# Patient Record
Sex: FEMALE | Race: WHITE | NOT HISPANIC OR LATINO | Employment: OTHER | URBAN - METROPOLITAN AREA
[De-identification: names, ages, dates, MRNs, and addresses within clinical notes are randomized per-mention and may not be internally consistent; named-entity substitution may affect disease eponyms.]

---

## 2017-01-16 ENCOUNTER — COMMUNICATION - HEALTHEAST (OUTPATIENT)
Dept: INTERNAL MEDICINE | Facility: CLINIC | Age: 82
End: 2017-01-16

## 2017-01-16 ENCOUNTER — AMBULATORY - HEALTHEAST (OUTPATIENT)
Dept: LAB | Facility: CLINIC | Age: 82
End: 2017-01-16

## 2017-01-16 DIAGNOSIS — I48.19 PERSISTENT ATRIAL FIBRILLATION (H): ICD-10-CM

## 2017-01-16 DIAGNOSIS — I48.91 ATRIAL FIBRILLATION (H): ICD-10-CM

## 2017-01-23 ENCOUNTER — OFFICE VISIT - HEALTHEAST (OUTPATIENT)
Dept: INTERNAL MEDICINE | Facility: CLINIC | Age: 82
End: 2017-01-23

## 2017-01-23 ENCOUNTER — COMMUNICATION - HEALTHEAST (OUTPATIENT)
Dept: INTERNAL MEDICINE | Facility: CLINIC | Age: 82
End: 2017-01-23

## 2017-01-23 DIAGNOSIS — E78.5 HYPERLIPIDEMIA: ICD-10-CM

## 2017-01-23 DIAGNOSIS — Z51.81 MEDICATION MONITORING ENCOUNTER: ICD-10-CM

## 2017-01-23 DIAGNOSIS — M81.0 OSTEOPOROSIS: ICD-10-CM

## 2017-01-23 LAB
CHOLEST SERPL-MCNC: 220 MG/DL
FASTING STATUS PATIENT QL REPORTED: YES
HDLC SERPL-MCNC: 49 MG/DL
LDLC SERPL CALC-MCNC: 146 MG/DL
TRIGL SERPL-MCNC: 124 MG/DL

## 2017-01-23 ASSESSMENT — MIFFLIN-ST. JEOR: SCORE: 997.17

## 2017-01-24 ENCOUNTER — COMMUNICATION - HEALTHEAST (OUTPATIENT)
Dept: INTERNAL MEDICINE | Facility: CLINIC | Age: 82
End: 2017-01-24

## 2017-02-03 ENCOUNTER — COMMUNICATION - HEALTHEAST (OUTPATIENT)
Dept: CARDIOLOGY | Facility: CLINIC | Age: 82
End: 2017-02-03

## 2017-02-06 ENCOUNTER — COMMUNICATION - HEALTHEAST (OUTPATIENT)
Dept: INTERNAL MEDICINE | Facility: CLINIC | Age: 82
End: 2017-02-06

## 2017-02-06 ENCOUNTER — COMMUNICATION - HEALTHEAST (OUTPATIENT)
Dept: CARDIOLOGY | Facility: CLINIC | Age: 82
End: 2017-02-06

## 2017-02-06 ENCOUNTER — OFFICE VISIT - HEALTHEAST (OUTPATIENT)
Dept: INTERNAL MEDICINE | Facility: CLINIC | Age: 82
End: 2017-02-06

## 2017-02-06 DIAGNOSIS — I48.19 PERSISTENT ATRIAL FIBRILLATION (H): ICD-10-CM

## 2017-02-06 DIAGNOSIS — R00.1 BRADYCARDIA: ICD-10-CM

## 2017-02-06 DIAGNOSIS — I10 ESSENTIAL HYPERTENSION: ICD-10-CM

## 2017-02-06 DIAGNOSIS — R63.5 WEIGHT GAIN: ICD-10-CM

## 2017-02-06 DIAGNOSIS — I48.91 ATRIAL FIBRILLATION (H): ICD-10-CM

## 2017-02-06 DIAGNOSIS — Z79.899 ENCOUNTER FOR LONG-TERM (CURRENT) USE OF OTHER MEDICATIONS: ICD-10-CM

## 2017-02-06 DIAGNOSIS — R06.02 SOB (SHORTNESS OF BREATH): ICD-10-CM

## 2017-02-06 DIAGNOSIS — I51.9 HEART DISEASE, UNSPECIFIED: ICD-10-CM

## 2017-02-07 ENCOUNTER — COMMUNICATION - HEALTHEAST (OUTPATIENT)
Dept: INTERNAL MEDICINE | Facility: CLINIC | Age: 82
End: 2017-02-07

## 2017-02-07 LAB
ATRIAL RATE - MUSE: NORMAL BPM
DIASTOLIC BLOOD PRESSURE - MUSE: NORMAL MMHG
INTERPRETATION ECG - MUSE: NORMAL
P AXIS - MUSE: NORMAL DEGREES
PR INTERVAL - MUSE: NORMAL MS
QRS DURATION - MUSE: 98 MS
QT - MUSE: 520 MS
QTC - MUSE: 449 MS
R AXIS - MUSE: 74 DEGREES
SYSTOLIC BLOOD PRESSURE - MUSE: NORMAL MMHG
T AXIS - MUSE: -71 DEGREES
VENTRICULAR RATE- MUSE: 45 BPM

## 2017-02-08 ENCOUNTER — OFFICE VISIT - HEALTHEAST (OUTPATIENT)
Dept: CARDIOLOGY | Facility: CLINIC | Age: 82
End: 2017-02-08

## 2017-02-08 DIAGNOSIS — I48.21 PERMANENT ATRIAL FIBRILLATION (H): ICD-10-CM

## 2017-02-08 DIAGNOSIS — I49.8 JUNCTIONAL RHYTHM: ICD-10-CM

## 2017-02-08 ASSESSMENT — MIFFLIN-ST. JEOR: SCORE: 1008.29

## 2017-02-10 ENCOUNTER — HOSPITAL ENCOUNTER (OUTPATIENT)
Dept: CARDIOLOGY | Facility: CLINIC | Age: 82
Discharge: HOME | End: 2017-02-10
Attending: INTERNAL MEDICINE

## 2017-02-10 DIAGNOSIS — I48.21 PERMANENT ATRIAL FIBRILLATION (H): ICD-10-CM

## 2017-02-15 ENCOUNTER — AMBULATORY - HEALTHEAST (OUTPATIENT)
Dept: CARDIOLOGY | Facility: CLINIC | Age: 82
End: 2017-02-15

## 2017-02-15 DIAGNOSIS — I48.91 A-FIB (H): ICD-10-CM

## 2017-02-16 ENCOUNTER — AMBULATORY - HEALTHEAST (OUTPATIENT)
Dept: LAB | Facility: CLINIC | Age: 82
End: 2017-02-16

## 2017-02-16 ENCOUNTER — COMMUNICATION - HEALTHEAST (OUTPATIENT)
Dept: INTERNAL MEDICINE | Facility: CLINIC | Age: 82
End: 2017-02-16

## 2017-02-16 DIAGNOSIS — I48.19 PERSISTENT ATRIAL FIBRILLATION (H): ICD-10-CM

## 2017-02-16 DIAGNOSIS — I48.21 PERMANENT ATRIAL FIBRILLATION (H): ICD-10-CM

## 2017-02-17 ENCOUNTER — OFFICE VISIT - HEALTHEAST (OUTPATIENT)
Dept: CARDIOLOGY | Facility: CLINIC | Age: 82
End: 2017-02-17

## 2017-02-17 DIAGNOSIS — I10 ESSENTIAL HYPERTENSION WITH GOAL BLOOD PRESSURE LESS THAN 140/90: ICD-10-CM

## 2017-02-17 DIAGNOSIS — I48.91 A-FIB (H): ICD-10-CM

## 2017-02-17 DIAGNOSIS — I48.20 CHRONIC ATRIAL FIBRILLATION (H): ICD-10-CM

## 2017-02-17 DIAGNOSIS — I48.19 PERSISTENT ATRIAL FIBRILLATION (H): ICD-10-CM

## 2017-02-17 LAB
ATRIAL RATE - MUSE: 63 BPM
DIASTOLIC BLOOD PRESSURE - MUSE: NORMAL MMHG
INTERPRETATION ECG - MUSE: NORMAL
P AXIS - MUSE: NORMAL DEGREES
PR INTERVAL - MUSE: NORMAL MS
QRS DURATION - MUSE: 118 MS
QT - MUSE: 486 MS
QTC - MUSE: 460 MS
R AXIS - MUSE: 65 DEGREES
SYSTOLIC BLOOD PRESSURE - MUSE: NORMAL MMHG
T AXIS - MUSE: -57 DEGREES
VENTRICULAR RATE- MUSE: 54 BPM

## 2017-02-17 ASSESSMENT — MIFFLIN-ST. JEOR: SCORE: 990.14

## 2017-03-02 ENCOUNTER — AMBULATORY - HEALTHEAST (OUTPATIENT)
Dept: LAB | Facility: CLINIC | Age: 82
End: 2017-03-02

## 2017-03-02 ENCOUNTER — COMMUNICATION - HEALTHEAST (OUTPATIENT)
Dept: INTERNAL MEDICINE | Facility: CLINIC | Age: 82
End: 2017-03-02

## 2017-03-02 DIAGNOSIS — I48.20 CHRONIC ATRIAL FIBRILLATION (H): ICD-10-CM

## 2017-03-02 DIAGNOSIS — I48.19 PERSISTENT ATRIAL FIBRILLATION (H): ICD-10-CM

## 2017-03-02 DIAGNOSIS — K21.9 ESOPHAGEAL REFLUX: ICD-10-CM

## 2017-03-10 ENCOUNTER — COMMUNICATION - HEALTHEAST (OUTPATIENT)
Dept: CARDIOLOGY | Facility: CLINIC | Age: 82
End: 2017-03-10

## 2017-03-14 ENCOUNTER — COMMUNICATION - HEALTHEAST (OUTPATIENT)
Dept: INTERNAL MEDICINE | Facility: CLINIC | Age: 82
End: 2017-03-14

## 2017-03-29 ENCOUNTER — AMBULATORY - HEALTHEAST (OUTPATIENT)
Dept: NURSING | Facility: CLINIC | Age: 82
End: 2017-03-29

## 2017-03-29 ENCOUNTER — AMBULATORY - HEALTHEAST (OUTPATIENT)
Dept: LAB | Facility: CLINIC | Age: 82
End: 2017-03-29

## 2017-03-29 ENCOUNTER — COMMUNICATION - HEALTHEAST (OUTPATIENT)
Dept: INTERNAL MEDICINE | Facility: CLINIC | Age: 82
End: 2017-03-29

## 2017-03-29 DIAGNOSIS — I48.20 CHRONIC ATRIAL FIBRILLATION (H): ICD-10-CM

## 2017-03-29 DIAGNOSIS — I48.19 PERSISTENT ATRIAL FIBRILLATION (H): ICD-10-CM

## 2017-04-19 ENCOUNTER — OFFICE VISIT - HEALTHEAST (OUTPATIENT)
Dept: CARDIOLOGY | Facility: CLINIC | Age: 82
End: 2017-04-19

## 2017-04-19 DIAGNOSIS — I10 ESSENTIAL HYPERTENSION WITH GOAL BLOOD PRESSURE LESS THAN 140/90: ICD-10-CM

## 2017-04-19 DIAGNOSIS — I48.91 ATRIAL FIBRILLATION (H): ICD-10-CM

## 2017-04-19 DIAGNOSIS — I48.20 CHRONIC ATRIAL FIBRILLATION (H): ICD-10-CM

## 2017-04-19 ASSESSMENT — MIFFLIN-ST. JEOR: SCORE: 990.14

## 2017-04-27 ENCOUNTER — AMBULATORY - HEALTHEAST (OUTPATIENT)
Dept: LAB | Facility: CLINIC | Age: 82
End: 2017-04-27

## 2017-04-27 ENCOUNTER — COMMUNICATION - HEALTHEAST (OUTPATIENT)
Dept: INTERNAL MEDICINE | Facility: CLINIC | Age: 82
End: 2017-04-27

## 2017-04-27 DIAGNOSIS — I48.19 PERSISTENT ATRIAL FIBRILLATION (H): ICD-10-CM

## 2017-04-27 DIAGNOSIS — I48.20 CHRONIC ATRIAL FIBRILLATION (H): ICD-10-CM

## 2017-05-01 ENCOUNTER — HOSPITAL ENCOUNTER (OUTPATIENT)
Dept: CARDIOLOGY | Facility: HOSPITAL | Age: 82
Discharge: HOME | End: 2017-05-01
Attending: INTERNAL MEDICINE

## 2017-05-01 DIAGNOSIS — I48.91 ATRIAL FIBRILLATION (H): ICD-10-CM

## 2017-05-01 ASSESSMENT — MIFFLIN-ST. JEOR: SCORE: 990.14

## 2017-05-02 LAB
AORTIC ROOT: 2.6 CM
AR DECEL SLOPE: 3460 MM/S2
AR PEAK VELOCITY: 432 CM/S
AV REGURGITANT PEAK GRADIENT: 74.6 MMHG
AV REGURGITATION PRESSURE HALF TIME: 380 MS
BSA FOR ECHO PROCEDURE: 1.64 M2
CV BLOOD PRESSURE: NORMAL MMHG
CV ECHO HEIGHT: 62 IN
CV ECHO WEIGHT: 136 LBS
DOP CALC LVOT AREA: 2.54 CM2
DOP CALC LVOT DIAMETER: 1.8 CM
DOP CALC LVOT PEAK VEL: 70.2 CM/S
DOP CALC LVOT STROKE VOLUME: 45.8 CM3
DOP CALCLVOT PEAK VEL VTI: 18 CM
EJECTION FRACTION: 54 % (ref 55–75)
FRACTIONAL SHORTENING: 43.2 % (ref 28–44)
INTERVENTRICULAR SEPTUM IN END DIASTOLE: 0.8 CM (ref 0.6–0.9)
IVS/PW RATIO: 1.1
LEFT ATRIUM AREA: 13.9 CM2
LEFT ATRIUM LENGTH: 4.9 CM
LEFT ATRIUM SIZE: 4.1 CM
LEFT ATRIUM TO AORTIC ROOT RATIO: 1.58 NO UNITS
LEFT VENTRICLE DIASTOLIC VOLUME INDEX: 23.8 CM3/M2 (ref 34–74)
LEFT VENTRICLE DIASTOLIC VOLUME: 39 CM3 (ref 46–106)
LEFT VENTRICLE MASS INDEX: 61.3 G/M2
LEFT VENTRICLE SYSTOLIC VOLUME INDEX: 11 CM3/M2 (ref 11–31)
LEFT VENTRICLE SYSTOLIC VOLUME: 18 CM3 (ref 14–42)
LEFT VENTRICULAR INTERNAL DIMENSION IN DIASTOLE: 4.4 CM (ref 3.8–5.2)
LEFT VENTRICULAR INTERNAL DIMENSION IN SYSTOLE: 2.5 CM (ref 2.2–3.5)
LEFT VENTRICULAR MASS: 100.6 G
LEFT VENTRICULAR OUTFLOW TRACT MEAN GRADIENT: 1 MMHG
LEFT VENTRICULAR OUTFLOW TRACT MEAN VELOCITY: 51.1 CM/S
LEFT VENTRICULAR OUTFLOW TRACT PEAK GRADIENT: 2 MMHG
LEFT VENTRICULAR POSTERIOR WALL IN END DIASTOLE: 0.7 CM (ref 0.6–0.9)
LV STROKE VOLUME INDEX: 27.9 ML/M2
MV AVERAGE E/E' RATIO: 21.3 CM/S
MV DECELERATION TIME: 106 MS
MV E'TISSUE VEL-LAT: 5.17 CM/S
MV E'TISSUE VEL-MED: 4.87 CM/S
MV LATERAL E/E' RATIO: 20.7
MV MEDIAL E/E' RATIO: 22
MV PEAK E VELOCITY: 107 CM/S
NUC REST DIASTOLIC VOLUME INDEX: 2176 LBS
NUC REST SYSTOLIC VOLUME INDEX: 62 IN
PR MAX PG: 9 MMHG
PR PEAK VELOCITY: 166 CM/S
TRICUSPID REGURGITATION PEAK PRESSURE GRADIENT: 25 MMHG
TRICUSPID VALVE PEAK REGURGITANT VELOCITY: 250 CM/S

## 2017-05-12 ENCOUNTER — COMMUNICATION - HEALTHEAST (OUTPATIENT)
Dept: CARDIOLOGY | Facility: CLINIC | Age: 82
End: 2017-05-12

## 2017-05-12 DIAGNOSIS — I48.19 PERSISTENT ATRIAL FIBRILLATION (H): ICD-10-CM

## 2017-05-25 ENCOUNTER — AMBULATORY - HEALTHEAST (OUTPATIENT)
Dept: LAB | Facility: CLINIC | Age: 82
End: 2017-05-25

## 2017-05-25 ENCOUNTER — COMMUNICATION - HEALTHEAST (OUTPATIENT)
Dept: INTERNAL MEDICINE | Facility: CLINIC | Age: 82
End: 2017-05-25

## 2017-05-25 DIAGNOSIS — I48.20 CHRONIC ATRIAL FIBRILLATION (H): ICD-10-CM

## 2017-05-25 DIAGNOSIS — I48.19 PERSISTENT ATRIAL FIBRILLATION (H): ICD-10-CM

## 2017-05-31 ENCOUNTER — COMMUNICATION - HEALTHEAST (OUTPATIENT)
Dept: INTERNAL MEDICINE | Facility: CLINIC | Age: 82
End: 2017-05-31

## 2017-05-31 DIAGNOSIS — E87.6 DIURETIC-INDUCED HYPOKALEMIA: ICD-10-CM

## 2017-05-31 DIAGNOSIS — T50.2X5A DIURETIC-INDUCED HYPOKALEMIA: ICD-10-CM

## 2017-06-02 ENCOUNTER — OFFICE VISIT - HEALTHEAST (OUTPATIENT)
Dept: CARDIOLOGY | Facility: CLINIC | Age: 82
End: 2017-06-02

## 2017-06-02 DIAGNOSIS — I10 ESSENTIAL HYPERTENSION WITH GOAL BLOOD PRESSURE LESS THAN 130/80: ICD-10-CM

## 2017-06-02 DIAGNOSIS — I48.20 CHRONIC ATRIAL FIBRILLATION (H): ICD-10-CM

## 2017-06-02 RX ORDER — CLINDAMYCIN HCL 300 MG
CAPSULE ORAL
Refills: 0 | Status: SHIPPED | COMMUNITY
Start: 2017-05-18 | End: 2023-01-01

## 2017-06-02 ASSESSMENT — MIFFLIN-ST. JEOR: SCORE: 994.68

## 2017-06-16 ENCOUNTER — COMMUNICATION - HEALTHEAST (OUTPATIENT)
Dept: CARDIOLOGY | Facility: CLINIC | Age: 82
End: 2017-06-16

## 2017-06-21 ENCOUNTER — COMMUNICATION - HEALTHEAST (OUTPATIENT)
Dept: INTERNAL MEDICINE | Facility: CLINIC | Age: 82
End: 2017-06-21

## 2017-06-21 ENCOUNTER — AMBULATORY - HEALTHEAST (OUTPATIENT)
Dept: LAB | Facility: CLINIC | Age: 82
End: 2017-06-21

## 2017-06-21 DIAGNOSIS — I48.20 CHRONIC ATRIAL FIBRILLATION (H): ICD-10-CM

## 2017-06-21 DIAGNOSIS — R60.9 EDEMA: ICD-10-CM

## 2017-06-21 DIAGNOSIS — I10 ESSENTIAL HYPERTENSION: ICD-10-CM

## 2017-06-21 DIAGNOSIS — I48.19 PERSISTENT ATRIAL FIBRILLATION (H): ICD-10-CM

## 2017-07-11 ENCOUNTER — COMMUNICATION - HEALTHEAST (OUTPATIENT)
Dept: INTERNAL MEDICINE | Facility: CLINIC | Age: 82
End: 2017-07-11

## 2017-07-11 DIAGNOSIS — I48.20 CHRONIC ATRIAL FIBRILLATION (H): ICD-10-CM

## 2017-07-18 ENCOUNTER — COMMUNICATION - HEALTHEAST (OUTPATIENT)
Dept: CARDIOLOGY | Facility: CLINIC | Age: 82
End: 2017-07-18

## 2017-07-18 DIAGNOSIS — I48.20 CHRONIC ATRIAL FIBRILLATION (H): ICD-10-CM

## 2017-07-25 ENCOUNTER — COMMUNICATION - HEALTHEAST (OUTPATIENT)
Dept: INTERNAL MEDICINE | Facility: CLINIC | Age: 82
End: 2017-07-25

## 2017-07-25 ENCOUNTER — OFFICE VISIT - HEALTHEAST (OUTPATIENT)
Dept: INTERNAL MEDICINE | Facility: CLINIC | Age: 82
End: 2017-07-25

## 2017-07-25 DIAGNOSIS — Z51.81 MEDICATION MONITORING ENCOUNTER: ICD-10-CM

## 2017-07-25 DIAGNOSIS — I48.20 CHRONIC ATRIAL FIBRILLATION (H): ICD-10-CM

## 2017-07-25 DIAGNOSIS — I48.19 PERSISTENT ATRIAL FIBRILLATION (H): ICD-10-CM

## 2017-07-25 DIAGNOSIS — E03.9 HYPOTHYROIDISM: ICD-10-CM

## 2017-07-25 ASSESSMENT — MIFFLIN-ST. JEOR: SCORE: 1022.8

## 2017-07-26 ENCOUNTER — COMMUNICATION - HEALTHEAST (OUTPATIENT)
Dept: INTERNAL MEDICINE | Facility: CLINIC | Age: 82
End: 2017-07-26

## 2017-07-31 ENCOUNTER — COMMUNICATION - HEALTHEAST (OUTPATIENT)
Dept: INTERNAL MEDICINE | Facility: CLINIC | Age: 82
End: 2017-07-31

## 2017-07-31 DIAGNOSIS — I48.20 CHRONIC ATRIAL FIBRILLATION (H): ICD-10-CM

## 2017-08-01 ENCOUNTER — COMMUNICATION - HEALTHEAST (OUTPATIENT)
Dept: INTERNAL MEDICINE | Facility: CLINIC | Age: 82
End: 2017-08-01

## 2017-08-09 ENCOUNTER — AMBULATORY - HEALTHEAST (OUTPATIENT)
Dept: LAB | Facility: CLINIC | Age: 82
End: 2017-08-09

## 2017-08-09 ENCOUNTER — COMMUNICATION - HEALTHEAST (OUTPATIENT)
Dept: INTERNAL MEDICINE | Facility: CLINIC | Age: 82
End: 2017-08-09

## 2017-08-09 ENCOUNTER — AMBULATORY - HEALTHEAST (OUTPATIENT)
Dept: NURSING | Facility: CLINIC | Age: 82
End: 2017-08-09

## 2017-08-09 DIAGNOSIS — I48.20 CHRONIC ATRIAL FIBRILLATION (H): ICD-10-CM

## 2017-08-09 DIAGNOSIS — I48.19 PERSISTENT ATRIAL FIBRILLATION (H): ICD-10-CM

## 2017-08-22 ENCOUNTER — OFFICE VISIT - HEALTHEAST (OUTPATIENT)
Dept: INTERNAL MEDICINE | Facility: CLINIC | Age: 82
End: 2017-08-22

## 2017-08-22 ENCOUNTER — COMMUNICATION - HEALTHEAST (OUTPATIENT)
Dept: INTERNAL MEDICINE | Facility: CLINIC | Age: 82
End: 2017-08-22

## 2017-08-22 DIAGNOSIS — I48.20 CHRONIC ATRIAL FIBRILLATION (H): ICD-10-CM

## 2017-08-22 DIAGNOSIS — I48.21 PERMANENT ATRIAL FIBRILLATION (H): ICD-10-CM

## 2017-08-22 DIAGNOSIS — Z51.81 MEDICATION MONITORING ENCOUNTER: ICD-10-CM

## 2017-08-22 DIAGNOSIS — I48.19 PERSISTENT ATRIAL FIBRILLATION (H): ICD-10-CM

## 2017-08-22 ASSESSMENT — MIFFLIN-ST. JEOR: SCORE: 1009.7

## 2017-08-23 ENCOUNTER — COMMUNICATION - HEALTHEAST (OUTPATIENT)
Dept: INTERNAL MEDICINE | Facility: CLINIC | Age: 82
End: 2017-08-23

## 2017-08-30 ENCOUNTER — HOSPITAL ENCOUNTER (OUTPATIENT)
Dept: CARDIOLOGY | Facility: HOSPITAL | Age: 82
Discharge: HOME | End: 2017-08-30
Attending: INTERNAL MEDICINE

## 2017-08-30 DIAGNOSIS — I48.21 PERMANENT ATRIAL FIBRILLATION (H): ICD-10-CM

## 2017-09-05 ENCOUNTER — AMBULATORY - HEALTHEAST (OUTPATIENT)
Dept: LAB | Facility: CLINIC | Age: 82
End: 2017-09-05

## 2017-09-05 ENCOUNTER — COMMUNICATION - HEALTHEAST (OUTPATIENT)
Dept: INTERNAL MEDICINE | Facility: CLINIC | Age: 82
End: 2017-09-05

## 2017-09-05 DIAGNOSIS — I48.19 PERSISTENT ATRIAL FIBRILLATION (H): ICD-10-CM

## 2017-09-05 DIAGNOSIS — I48.20 CHRONIC ATRIAL FIBRILLATION (H): ICD-10-CM

## 2017-09-07 ENCOUNTER — COMMUNICATION - HEALTHEAST (OUTPATIENT)
Dept: INTERNAL MEDICINE | Facility: CLINIC | Age: 82
End: 2017-09-07

## 2017-09-19 ENCOUNTER — COMMUNICATION - HEALTHEAST (OUTPATIENT)
Dept: INTERNAL MEDICINE | Facility: CLINIC | Age: 82
End: 2017-09-19

## 2017-09-19 ENCOUNTER — AMBULATORY - HEALTHEAST (OUTPATIENT)
Dept: LAB | Facility: CLINIC | Age: 82
End: 2017-09-19

## 2017-09-19 DIAGNOSIS — I48.19 PERSISTENT ATRIAL FIBRILLATION (H): ICD-10-CM

## 2017-09-19 DIAGNOSIS — I48.20 CHRONIC ATRIAL FIBRILLATION (H): ICD-10-CM

## 2017-09-27 ENCOUNTER — COMMUNICATION - HEALTHEAST (OUTPATIENT)
Dept: INTERNAL MEDICINE | Facility: CLINIC | Age: 82
End: 2017-09-27

## 2017-09-27 DIAGNOSIS — I48.20 CHRONIC ATRIAL FIBRILLATION (H): ICD-10-CM

## 2017-10-11 ENCOUNTER — COMMUNICATION - HEALTHEAST (OUTPATIENT)
Dept: INTERNAL MEDICINE | Facility: CLINIC | Age: 82
End: 2017-10-11

## 2017-10-11 ENCOUNTER — AMBULATORY - HEALTHEAST (OUTPATIENT)
Dept: NURSING | Facility: CLINIC | Age: 82
End: 2017-10-11

## 2017-10-11 ENCOUNTER — AMBULATORY - HEALTHEAST (OUTPATIENT)
Dept: LAB | Facility: CLINIC | Age: 82
End: 2017-10-11

## 2017-10-11 DIAGNOSIS — I48.20 CHRONIC ATRIAL FIBRILLATION (H): ICD-10-CM

## 2017-10-11 DIAGNOSIS — Z00.00 HEALTH MAINTENANCE EXAMINATION: ICD-10-CM

## 2017-10-11 DIAGNOSIS — I48.19 PERSISTENT ATRIAL FIBRILLATION (H): ICD-10-CM

## 2017-10-19 ENCOUNTER — AMBULATORY - HEALTHEAST (OUTPATIENT)
Dept: CARDIOLOGY | Facility: CLINIC | Age: 82
End: 2017-10-19

## 2017-10-19 DIAGNOSIS — I48.91 ATRIAL FIBRILLATION (H): ICD-10-CM

## 2017-11-02 ENCOUNTER — COMMUNICATION - HEALTHEAST (OUTPATIENT)
Dept: FAMILY MEDICINE | Facility: CLINIC | Age: 82
End: 2017-11-02

## 2017-11-02 ENCOUNTER — AMBULATORY - HEALTHEAST (OUTPATIENT)
Dept: LAB | Facility: CLINIC | Age: 82
End: 2017-11-02

## 2017-11-02 DIAGNOSIS — I48.19 PERSISTENT ATRIAL FIBRILLATION (H): ICD-10-CM

## 2017-11-02 DIAGNOSIS — I48.20 CHRONIC ATRIAL FIBRILLATION (H): ICD-10-CM

## 2017-11-03 ENCOUNTER — HOSPITAL ENCOUNTER (OUTPATIENT)
Dept: CARDIOLOGY | Facility: HOSPITAL | Age: 82
Discharge: HOME | End: 2017-11-03
Attending: INTERNAL MEDICINE

## 2017-11-03 DIAGNOSIS — I48.91 ATRIAL FIBRILLATION (H): ICD-10-CM

## 2017-11-03 LAB
AORTIC ROOT: 2.6 CM
AR DECEL SLOPE: 2950 MM/S2
AR PEAK VELOCITY: 441 CM/S
ASCENDING AORTA: 3.2 CM
AV REGURGITANT PEAK GRADIENT: 77.8 MMHG
AV REGURGITATION PRESSURE HALF TIME: 441 MS
BSA FOR ECHO PROCEDURE: 1.67 M2
CV BLOOD PRESSURE: NORMAL MMHG
CV ECHO HEIGHT: 62 IN
CV ECHO WEIGHT: 140 LBS
DOP CALC LVOT AREA: 3.14 CM2
DOP CALC LVOT DIAMETER: 2 CM
DOP CALC LVOT PEAK VEL: 88.3 CM/S
DOP CALC LVOT STROKE VOLUME: 46.2 CM3
DOP CALCLVOT PEAK VEL VTI: 14.7 CM
EJECTION FRACTION: 53 % (ref 55–75)
FRACTIONAL SHORTENING: 27 % (ref 28–44)
INTERVENTRICULAR SEPTUM IN END DIASTOLE: 1.39 CM (ref 0.6–0.9)
IVS/PW RATIO: 1.3
LA AREA 1: 21.6 CM2
LA AREA 2: 19.7 CM2
LEFT ATRIUM LENGTH: 6.2 CM
LEFT ATRIUM SIZE: 3.3 CM
LEFT ATRIUM TO AORTIC ROOT RATIO: 1.27 NO UNITS
LEFT ATRIUM VOLUME INDEX: 34.9 ML/M2
LEFT ATRIUM VOLUME: 58.3 CM3
LEFT VENTRICLE DIASTOLIC VOLUME INDEX: 27.8 CM3/M2 (ref 34–74)
LEFT VENTRICLE DIASTOLIC VOLUME: 46.5 CM3 (ref 46–106)
LEFT VENTRICLE MASS INDEX: 86 G/M2
LEFT VENTRICLE SYSTOLIC VOLUME INDEX: 13 CM3/M2 (ref 11–31)
LEFT VENTRICLE SYSTOLIC VOLUME: 21.7 CM3 (ref 14–42)
LEFT VENTRICULAR INTERNAL DIMENSION IN DIASTOLE: 3.59 CM (ref 3.8–5.2)
LEFT VENTRICULAR INTERNAL DIMENSION IN SYSTOLE: 2.62 CM (ref 2.2–3.5)
LEFT VENTRICULAR MASS: 143.6 G
LEFT VENTRICULAR OUTFLOW TRACT MEAN GRADIENT: 2 MMHG
LEFT VENTRICULAR OUTFLOW TRACT MEAN VELOCITY: 57.3 CM/S
LEFT VENTRICULAR OUTFLOW TRACT PEAK GRADIENT: 3 MMHG
LEFT VENTRICULAR POSTERIOR WALL IN END DIASTOLE: 1.04 CM (ref 0.6–0.9)
LV STROKE VOLUME INDEX: 27.6 ML/M2
MITRAL VALVE DECELERATION SLOPE: 5770 MM/S2
MITRAL VALVE PRESSURE HALF-TIME: 60 MS
MV AVERAGE E/E' RATIO: 12.3 CM/S
MV DECELERATION TIME: 155 MS
MV E'TISSUE VEL-LAT: 10.6 CM/S
MV E'TISSUE VEL-MED: 6.19 CM/S
MV LATERAL E/E' RATIO: 9.7
MV MEDIAL E/E' RATIO: 16.6
MV PEAK E VELOCITY: 103 CM/S
MV VALVE AREA PRESSURE 1/2 METHOD: 3.7 CM2
NUC REST DIASTOLIC VOLUME INDEX: 2240 LBS
NUC REST SYSTOLIC VOLUME INDEX: 62 IN
TRICUSPID REGURGITATION PEAK PRESSURE GRADIENT: 25.8 MMHG
TRICUSPID VALVE ANULAR PLANE SYSTOLIC EXCURSION: 1.9 CM
TRICUSPID VALVE PEAK REGURGITANT VELOCITY: 254 CM/S

## 2017-11-03 ASSESSMENT — MIFFLIN-ST. JEOR: SCORE: 1008.29

## 2017-11-10 ENCOUNTER — OFFICE VISIT - HEALTHEAST (OUTPATIENT)
Dept: CARDIOLOGY | Facility: CLINIC | Age: 82
End: 2017-11-10

## 2017-11-10 DIAGNOSIS — I10 ESSENTIAL HYPERTENSION: ICD-10-CM

## 2017-11-10 DIAGNOSIS — I48.20 CHRONIC ATRIAL FIBRILLATION (H): ICD-10-CM

## 2017-11-10 ASSESSMENT — MIFFLIN-ST. JEOR: SCORE: 985.61

## 2017-11-13 ENCOUNTER — RECORDS - HEALTHEAST (OUTPATIENT)
Dept: ADMINISTRATIVE | Facility: OTHER | Age: 82
End: 2017-11-13

## 2017-11-13 ENCOUNTER — RECORDS - HEALTHEAST (OUTPATIENT)
Dept: BONE DENSITY | Facility: CLINIC | Age: 82
End: 2017-11-13

## 2017-11-13 DIAGNOSIS — M81.0 AGE-RELATED OSTEOPOROSIS WITHOUT CURRENT PATHOLOGICAL FRACTURE: ICD-10-CM

## 2017-11-20 ENCOUNTER — COMMUNICATION - HEALTHEAST (OUTPATIENT)
Dept: INTERNAL MEDICINE | Facility: CLINIC | Age: 82
End: 2017-11-20

## 2017-11-20 ENCOUNTER — OFFICE VISIT - HEALTHEAST (OUTPATIENT)
Dept: INTERNAL MEDICINE | Facility: CLINIC | Age: 82
End: 2017-11-20

## 2017-11-20 DIAGNOSIS — I48.19 PERSISTENT ATRIAL FIBRILLATION (H): ICD-10-CM

## 2017-11-20 DIAGNOSIS — M25.562 CHRONIC PAIN OF LEFT KNEE: ICD-10-CM

## 2017-11-20 DIAGNOSIS — I48.20 CHRONIC ATRIAL FIBRILLATION (H): ICD-10-CM

## 2017-11-20 DIAGNOSIS — L30.9 DERMATITIS: ICD-10-CM

## 2017-11-20 DIAGNOSIS — G89.29 CHRONIC PAIN OF LEFT KNEE: ICD-10-CM

## 2017-11-20 DIAGNOSIS — M25.569 KNEE PAIN: ICD-10-CM

## 2017-11-20 ASSESSMENT — MIFFLIN-ST. JEOR: SCORE: 988.33

## 2017-11-21 ENCOUNTER — COMMUNICATION - HEALTHEAST (OUTPATIENT)
Dept: INTERNAL MEDICINE | Facility: CLINIC | Age: 82
End: 2017-11-21

## 2017-11-21 DIAGNOSIS — I48.20 CHRONIC ATRIAL FIBRILLATION (H): ICD-10-CM

## 2017-12-19 ENCOUNTER — AMBULATORY - HEALTHEAST (OUTPATIENT)
Dept: LAB | Facility: CLINIC | Age: 82
End: 2017-12-19

## 2017-12-19 ENCOUNTER — COMMUNICATION - HEALTHEAST (OUTPATIENT)
Dept: INTERNAL MEDICINE | Facility: CLINIC | Age: 82
End: 2017-12-19

## 2017-12-19 DIAGNOSIS — I48.19 PERSISTENT ATRIAL FIBRILLATION (H): ICD-10-CM

## 2017-12-19 DIAGNOSIS — I48.20 CHRONIC ATRIAL FIBRILLATION (H): ICD-10-CM

## 2018-01-03 ENCOUNTER — AMBULATORY - HEALTHEAST (OUTPATIENT)
Dept: LAB | Facility: CLINIC | Age: 83
End: 2018-01-03

## 2018-01-03 ENCOUNTER — COMMUNICATION - HEALTHEAST (OUTPATIENT)
Dept: INTERNAL MEDICINE | Facility: CLINIC | Age: 83
End: 2018-01-03

## 2018-01-03 DIAGNOSIS — I48.19 PERSISTENT ATRIAL FIBRILLATION (H): ICD-10-CM

## 2018-01-03 DIAGNOSIS — I48.20 CHRONIC ATRIAL FIBRILLATION (H): ICD-10-CM

## 2018-01-03 LAB — INR PPP: 2.3 (ref 0.9–1.1)

## 2018-01-09 ENCOUNTER — COMMUNICATION - HEALTHEAST (OUTPATIENT)
Dept: INTERNAL MEDICINE | Facility: CLINIC | Age: 83
End: 2018-01-09

## 2018-01-09 DIAGNOSIS — I48.20 CHRONIC ATRIAL FIBRILLATION (H): ICD-10-CM

## 2018-01-14 ENCOUNTER — COMMUNICATION - HEALTHEAST (OUTPATIENT)
Dept: CARDIOLOGY | Facility: CLINIC | Age: 83
End: 2018-01-14

## 2018-01-14 DIAGNOSIS — I48.20 CHRONIC ATRIAL FIBRILLATION (H): ICD-10-CM

## 2018-01-18 ENCOUNTER — AMBULATORY - HEALTHEAST (OUTPATIENT)
Dept: LAB | Facility: CLINIC | Age: 83
End: 2018-01-18

## 2018-01-18 ENCOUNTER — COMMUNICATION - HEALTHEAST (OUTPATIENT)
Dept: INTERNAL MEDICINE | Facility: CLINIC | Age: 83
End: 2018-01-18

## 2018-01-18 DIAGNOSIS — I48.20 CHRONIC ATRIAL FIBRILLATION (H): ICD-10-CM

## 2018-01-18 LAB — INR PPP: 3.5 (ref 0.9–1.1)

## 2018-02-01 ENCOUNTER — COMMUNICATION - HEALTHEAST (OUTPATIENT)
Dept: INTERNAL MEDICINE | Facility: CLINIC | Age: 83
End: 2018-02-01

## 2018-02-01 ENCOUNTER — AMBULATORY - HEALTHEAST (OUTPATIENT)
Dept: LAB | Facility: CLINIC | Age: 83
End: 2018-02-01

## 2018-02-01 DIAGNOSIS — I48.20 CHRONIC ATRIAL FIBRILLATION (H): ICD-10-CM

## 2018-02-01 LAB — INR PPP: 2.3 (ref 0.9–1.1)

## 2018-02-07 ENCOUNTER — AMBULATORY - HEALTHEAST (OUTPATIENT)
Dept: INTERNAL MEDICINE | Facility: CLINIC | Age: 83
End: 2018-02-07

## 2018-02-07 DIAGNOSIS — M81.0 OSTEOPOROSIS: ICD-10-CM

## 2018-02-12 ENCOUNTER — COMMUNICATION - HEALTHEAST (OUTPATIENT)
Dept: INTERNAL MEDICINE | Facility: CLINIC | Age: 83
End: 2018-02-12

## 2018-02-12 ENCOUNTER — OFFICE VISIT - HEALTHEAST (OUTPATIENT)
Dept: INTERNAL MEDICINE | Facility: CLINIC | Age: 83
End: 2018-02-12

## 2018-02-12 DIAGNOSIS — M81.0 AGE-RELATED OSTEOPOROSIS WITHOUT CURRENT PATHOLOGICAL FRACTURE: ICD-10-CM

## 2018-02-12 DIAGNOSIS — E78.2 MIXED HYPERLIPIDEMIA: ICD-10-CM

## 2018-02-12 DIAGNOSIS — Z51.81 MEDICATION MONITORING ENCOUNTER: ICD-10-CM

## 2018-02-12 DIAGNOSIS — I48.20 CHRONIC ATRIAL FIBRILLATION (H): ICD-10-CM

## 2018-02-12 LAB
ALBUMIN SERPL-MCNC: 3.8 G/DL (ref 3.5–5)
ALP SERPL-CCNC: 76 U/L (ref 45–120)
ALT SERPL W P-5'-P-CCNC: 18 U/L (ref 0–45)
ANION GAP SERPL CALCULATED.3IONS-SCNC: 10 MMOL/L (ref 5–18)
AST SERPL W P-5'-P-CCNC: 20 U/L (ref 0–40)
BILIRUB SERPL-MCNC: 0.6 MG/DL (ref 0–1)
BUN SERPL-MCNC: 16 MG/DL (ref 8–28)
CALCIUM SERPL-MCNC: 9.3 MG/DL (ref 8.5–10.5)
CHLORIDE BLD-SCNC: 104 MMOL/L (ref 98–107)
CHOLEST SERPL-MCNC: 208 MG/DL
CO2 SERPL-SCNC: 28 MMOL/L (ref 22–31)
CREAT SERPL-MCNC: 0.77 MG/DL (ref 0.6–1.1)
ERYTHROCYTE [DISTWIDTH] IN BLOOD BY AUTOMATED COUNT: 13.3 % (ref 11–14.5)
FASTING STATUS PATIENT QL REPORTED: YES
GFR SERPL CREATININE-BSD FRML MDRD: >60 ML/MIN/1.73M2
GLUCOSE BLD-MCNC: 97 MG/DL (ref 70–125)
HCT VFR BLD AUTO: 44.8 % (ref 35–47)
HDLC SERPL-MCNC: 42 MG/DL
HGB BLD-MCNC: 14.7 G/DL (ref 12–16)
INR PPP: 1.9 (ref 0.9–1.1)
LDLC SERPL CALC-MCNC: 134 MG/DL
MCH RBC QN AUTO: 28 PG (ref 27–34)
MCHC RBC AUTO-ENTMCNC: 32.8 G/DL (ref 32–36)
MCV RBC AUTO: 85 FL (ref 80–100)
PLATELET # BLD AUTO: 274 THOU/UL (ref 140–440)
PMV BLD AUTO: 8.5 FL (ref 7–10)
POTASSIUM BLD-SCNC: 3.9 MMOL/L (ref 3.5–5)
PROT SERPL-MCNC: 6.7 G/DL (ref 6–8)
RBC # BLD AUTO: 5.26 MILL/UL (ref 3.8–5.4)
SODIUM SERPL-SCNC: 142 MMOL/L (ref 136–145)
TRIGL SERPL-MCNC: 158 MG/DL
WBC: 8.7 THOU/UL (ref 4–11)

## 2018-02-12 ASSESSMENT — MIFFLIN-ST. JEOR: SCORE: 960.09

## 2018-02-13 ENCOUNTER — COMMUNICATION - HEALTHEAST (OUTPATIENT)
Dept: INTERNAL MEDICINE | Facility: CLINIC | Age: 83
End: 2018-02-13

## 2018-02-13 LAB — 25(OH)D3 SERPL-MCNC: 75.6 NG/ML (ref 30–80)

## 2018-03-01 ENCOUNTER — AMBULATORY - HEALTHEAST (OUTPATIENT)
Dept: LAB | Facility: CLINIC | Age: 83
End: 2018-03-01

## 2018-03-01 ENCOUNTER — COMMUNICATION - HEALTHEAST (OUTPATIENT)
Dept: INTERNAL MEDICINE | Facility: CLINIC | Age: 83
End: 2018-03-01

## 2018-03-01 DIAGNOSIS — I48.20 CHRONIC ATRIAL FIBRILLATION (H): ICD-10-CM

## 2018-03-01 LAB — INR PPP: 1.7 (ref 0.9–1.1)

## 2018-03-14 ENCOUNTER — AMBULATORY - HEALTHEAST (OUTPATIENT)
Dept: LAB | Facility: CLINIC | Age: 83
End: 2018-03-14

## 2018-03-14 ENCOUNTER — COMMUNICATION - HEALTHEAST (OUTPATIENT)
Dept: INTERNAL MEDICINE | Facility: CLINIC | Age: 83
End: 2018-03-14

## 2018-03-14 DIAGNOSIS — I48.20 CHRONIC ATRIAL FIBRILLATION (H): ICD-10-CM

## 2018-03-14 LAB — INR PPP: 1.9 (ref 0.9–1.1)

## 2018-03-20 ENCOUNTER — COMMUNICATION - HEALTHEAST (OUTPATIENT)
Dept: INTERNAL MEDICINE | Facility: CLINIC | Age: 83
End: 2018-03-20

## 2018-03-20 ENCOUNTER — AMBULATORY - HEALTHEAST (OUTPATIENT)
Dept: LAB | Facility: CLINIC | Age: 83
End: 2018-03-20

## 2018-03-20 DIAGNOSIS — I48.20 CHRONIC ATRIAL FIBRILLATION (H): ICD-10-CM

## 2018-03-20 LAB — INR PPP: 1.9 (ref 0.9–1.1)

## 2018-04-06 ENCOUNTER — AMBULATORY - HEALTHEAST (OUTPATIENT)
Dept: LAB | Facility: CLINIC | Age: 83
End: 2018-04-06

## 2018-04-06 ENCOUNTER — COMMUNICATION - HEALTHEAST (OUTPATIENT)
Dept: INTERNAL MEDICINE | Facility: CLINIC | Age: 83
End: 2018-04-06

## 2018-04-06 DIAGNOSIS — I48.20 CHRONIC ATRIAL FIBRILLATION (H): ICD-10-CM

## 2018-04-06 LAB — INR PPP: 3.6 (ref 0.9–1.1)

## 2018-04-09 ENCOUNTER — COMMUNICATION - HEALTHEAST (OUTPATIENT)
Dept: INTERNAL MEDICINE | Facility: CLINIC | Age: 83
End: 2018-04-09

## 2018-04-09 DIAGNOSIS — I48.20 CHRONIC ATRIAL FIBRILLATION (H): ICD-10-CM

## 2018-04-16 ENCOUNTER — COMMUNICATION - HEALTHEAST (OUTPATIENT)
Dept: ANTICOAGULATION | Facility: CLINIC | Age: 83
End: 2018-04-16

## 2018-04-16 ENCOUNTER — AMBULATORY - HEALTHEAST (OUTPATIENT)
Dept: LAB | Facility: CLINIC | Age: 83
End: 2018-04-16

## 2018-04-16 DIAGNOSIS — I48.20 CHRONIC ATRIAL FIBRILLATION (H): ICD-10-CM

## 2018-04-16 LAB — INR PPP: 1.3 (ref 0.9–1.1)

## 2018-04-17 ENCOUNTER — COMMUNICATION - HEALTHEAST (OUTPATIENT)
Dept: INTERNAL MEDICINE | Facility: CLINIC | Age: 83
End: 2018-04-17

## 2018-04-17 DIAGNOSIS — K21.9 ESOPHAGEAL REFLUX: ICD-10-CM

## 2018-04-23 ENCOUNTER — AMBULATORY - HEALTHEAST (OUTPATIENT)
Dept: LAB | Facility: CLINIC | Age: 83
End: 2018-04-23

## 2018-04-23 ENCOUNTER — COMMUNICATION - HEALTHEAST (OUTPATIENT)
Dept: ANTICOAGULATION | Facility: CLINIC | Age: 83
End: 2018-04-23

## 2018-04-23 DIAGNOSIS — I48.20 CHRONIC ATRIAL FIBRILLATION (H): ICD-10-CM

## 2018-04-23 LAB — INR PPP: 1.8 (ref 0.9–1.1)

## 2018-04-30 ENCOUNTER — COMMUNICATION - HEALTHEAST (OUTPATIENT)
Dept: INTERNAL MEDICINE | Facility: CLINIC | Age: 83
End: 2018-04-30

## 2018-04-30 ENCOUNTER — AMBULATORY - HEALTHEAST (OUTPATIENT)
Dept: LAB | Facility: CLINIC | Age: 83
End: 2018-04-30

## 2018-04-30 DIAGNOSIS — I48.20 CHRONIC ATRIAL FIBRILLATION (H): ICD-10-CM

## 2018-04-30 LAB — INR PPP: 1.8 (ref 0.9–1.1)

## 2018-05-01 ENCOUNTER — AMBULATORY - HEALTHEAST (OUTPATIENT)
Dept: CARDIOLOGY | Facility: CLINIC | Age: 83
End: 2018-05-01

## 2018-05-01 ENCOUNTER — OFFICE VISIT - HEALTHEAST (OUTPATIENT)
Dept: CARDIOLOGY | Facility: CLINIC | Age: 83
End: 2018-05-01

## 2018-05-01 DIAGNOSIS — I48.20 CHRONIC ATRIAL FIBRILLATION (H): ICD-10-CM

## 2018-05-01 DIAGNOSIS — Z00.6 EXAMINATION OF PARTICIPANT IN CLINICAL TRIAL: ICD-10-CM

## 2018-05-01 DIAGNOSIS — Z00.6 RESEARCH EXAM: ICD-10-CM

## 2018-05-01 LAB
ATRIAL RATE - MUSE: 98 BPM
DIASTOLIC BLOOD PRESSURE - MUSE: NORMAL MMHG
INTERPRETATION ECG - MUSE: NORMAL
P AXIS - MUSE: NORMAL DEGREES
PR INTERVAL - MUSE: NORMAL MS
QRS DURATION - MUSE: 124 MS
QT - MUSE: 390 MS
QTC - MUSE: 482 MS
R AXIS - MUSE: 101 DEGREES
SYSTOLIC BLOOD PRESSURE - MUSE: NORMAL MMHG
T AXIS - MUSE: -26 DEGREES
VENTRICULAR RATE- MUSE: 92 BPM

## 2018-05-01 ASSESSMENT — MIFFLIN-ST. JEOR: SCORE: 956.44

## 2018-05-07 ENCOUNTER — AMBULATORY - HEALTHEAST (OUTPATIENT)
Dept: LAB | Facility: CLINIC | Age: 83
End: 2018-05-07

## 2018-05-07 ENCOUNTER — COMMUNICATION - HEALTHEAST (OUTPATIENT)
Dept: ANTICOAGULATION | Facility: CLINIC | Age: 83
End: 2018-05-07

## 2018-05-07 ENCOUNTER — COMMUNICATION - HEALTHEAST (OUTPATIENT)
Dept: INTERNAL MEDICINE | Facility: CLINIC | Age: 83
End: 2018-05-07

## 2018-05-07 DIAGNOSIS — I48.20 CHRONIC ATRIAL FIBRILLATION (H): ICD-10-CM

## 2018-05-07 LAB — INR PPP: 1.5 (ref 0.9–1.1)

## 2018-05-14 ENCOUNTER — AMBULATORY - HEALTHEAST (OUTPATIENT)
Dept: LAB | Facility: CLINIC | Age: 83
End: 2018-05-14

## 2018-05-14 ENCOUNTER — COMMUNICATION - HEALTHEAST (OUTPATIENT)
Dept: ANTICOAGULATION | Facility: CLINIC | Age: 83
End: 2018-05-14

## 2018-05-14 DIAGNOSIS — I48.20 CHRONIC ATRIAL FIBRILLATION (H): ICD-10-CM

## 2018-05-14 LAB — INR PPP: 2.1 (ref 0.9–1.1)

## 2018-05-21 ENCOUNTER — AMBULATORY - HEALTHEAST (OUTPATIENT)
Dept: LAB | Facility: CLINIC | Age: 83
End: 2018-05-21

## 2018-05-21 ENCOUNTER — COMMUNICATION - HEALTHEAST (OUTPATIENT)
Dept: ANTICOAGULATION | Facility: CLINIC | Age: 83
End: 2018-05-21

## 2018-05-21 DIAGNOSIS — I48.20 CHRONIC ATRIAL FIBRILLATION (H): ICD-10-CM

## 2018-05-21 LAB — INR PPP: 2.5 (ref 0.9–1.1)

## 2018-05-23 ENCOUNTER — COMMUNICATION - HEALTHEAST (OUTPATIENT)
Dept: INTERNAL MEDICINE | Facility: CLINIC | Age: 83
End: 2018-05-23

## 2018-05-23 DIAGNOSIS — I48.20 CHRONIC ATRIAL FIBRILLATION (H): ICD-10-CM

## 2018-06-06 ENCOUNTER — AMBULATORY - HEALTHEAST (OUTPATIENT)
Dept: LAB | Facility: CLINIC | Age: 83
End: 2018-06-06

## 2018-06-06 ENCOUNTER — COMMUNICATION - HEALTHEAST (OUTPATIENT)
Dept: ANTICOAGULATION | Facility: CLINIC | Age: 83
End: 2018-06-06

## 2018-06-06 DIAGNOSIS — I48.20 CHRONIC ATRIAL FIBRILLATION (H): ICD-10-CM

## 2018-06-06 LAB — INR PPP: 2.6 (ref 0.9–1.1)

## 2018-06-18 ENCOUNTER — OFFICE VISIT - HEALTHEAST (OUTPATIENT)
Dept: CARDIOLOGY | Facility: CLINIC | Age: 83
End: 2018-06-18

## 2018-06-18 DIAGNOSIS — I48.20 CHRONIC ATRIAL FIBRILLATION (H): ICD-10-CM

## 2018-06-18 DIAGNOSIS — I10 ESSENTIAL HYPERTENSION: ICD-10-CM

## 2018-06-18 DIAGNOSIS — I50.32 DIASTOLIC CHF, CHRONIC (H): ICD-10-CM

## 2018-06-18 ASSESSMENT — MIFFLIN-ST. JEOR: SCORE: 959.52

## 2018-06-20 ENCOUNTER — COMMUNICATION - HEALTHEAST (OUTPATIENT)
Dept: ANTICOAGULATION | Facility: CLINIC | Age: 83
End: 2018-06-20

## 2018-06-20 ENCOUNTER — AMBULATORY - HEALTHEAST (OUTPATIENT)
Dept: LAB | Facility: CLINIC | Age: 83
End: 2018-06-20

## 2018-06-20 DIAGNOSIS — I48.20 CHRONIC ATRIAL FIBRILLATION (H): ICD-10-CM

## 2018-06-20 LAB — INR PPP: 2.9 (ref 0.9–1.1)

## 2018-06-27 ENCOUNTER — COMMUNICATION - HEALTHEAST (OUTPATIENT)
Dept: INTERNAL MEDICINE | Facility: CLINIC | Age: 83
End: 2018-06-27

## 2018-06-27 DIAGNOSIS — I10 ESSENTIAL HYPERTENSION: ICD-10-CM

## 2018-06-27 DIAGNOSIS — R60.9 EDEMA: ICD-10-CM

## 2018-07-05 ENCOUNTER — COMMUNICATION - HEALTHEAST (OUTPATIENT)
Dept: ANTICOAGULATION | Facility: CLINIC | Age: 83
End: 2018-07-05

## 2018-07-05 ENCOUNTER — AMBULATORY - HEALTHEAST (OUTPATIENT)
Dept: LAB | Facility: CLINIC | Age: 83
End: 2018-07-05

## 2018-07-05 DIAGNOSIS — I48.20 CHRONIC ATRIAL FIBRILLATION (H): ICD-10-CM

## 2018-07-05 LAB — INR PPP: 2.7 (ref 0.9–1.1)

## 2018-07-15 ENCOUNTER — COMMUNICATION - HEALTHEAST (OUTPATIENT)
Dept: CARDIOLOGY | Facility: CLINIC | Age: 83
End: 2018-07-15

## 2018-07-15 DIAGNOSIS — I48.20 CHRONIC ATRIAL FIBRILLATION (H): ICD-10-CM

## 2018-07-19 ENCOUNTER — COMMUNICATION - HEALTHEAST (OUTPATIENT)
Dept: ANTICOAGULATION | Facility: CLINIC | Age: 83
End: 2018-07-19

## 2018-07-19 ENCOUNTER — AMBULATORY - HEALTHEAST (OUTPATIENT)
Dept: LAB | Facility: CLINIC | Age: 83
End: 2018-07-19

## 2018-07-19 DIAGNOSIS — I48.20 CHRONIC ATRIAL FIBRILLATION (H): ICD-10-CM

## 2018-07-19 LAB — INR PPP: 2.9 (ref 0.9–1.1)

## 2018-07-23 ENCOUNTER — COMMUNICATION - HEALTHEAST (OUTPATIENT)
Dept: INTERNAL MEDICINE | Facility: CLINIC | Age: 83
End: 2018-07-23

## 2018-07-23 DIAGNOSIS — I48.19 PERSISTENT ATRIAL FIBRILLATION (H): ICD-10-CM

## 2018-07-31 ENCOUNTER — AMBULATORY - HEALTHEAST (OUTPATIENT)
Dept: LAB | Facility: CLINIC | Age: 83
End: 2018-07-31

## 2018-07-31 ENCOUNTER — COMMUNICATION - HEALTHEAST (OUTPATIENT)
Dept: ANTICOAGULATION | Facility: CLINIC | Age: 83
End: 2018-07-31

## 2018-07-31 DIAGNOSIS — I48.20 CHRONIC ATRIAL FIBRILLATION (H): ICD-10-CM

## 2018-07-31 LAB — INR PPP: 1.9 (ref 0.9–1.1)

## 2018-08-13 ENCOUNTER — OFFICE VISIT - HEALTHEAST (OUTPATIENT)
Dept: INTERNAL MEDICINE | Facility: CLINIC | Age: 83
End: 2018-08-13

## 2018-08-13 ENCOUNTER — COMMUNICATION - HEALTHEAST (OUTPATIENT)
Dept: ANTICOAGULATION | Facility: CLINIC | Age: 83
End: 2018-08-13

## 2018-08-13 DIAGNOSIS — Z51.81 MEDICATION MONITORING ENCOUNTER: ICD-10-CM

## 2018-08-13 DIAGNOSIS — E78.2 MIXED HYPERLIPIDEMIA: ICD-10-CM

## 2018-08-13 DIAGNOSIS — I48.20 CHRONIC ATRIAL FIBRILLATION (H): ICD-10-CM

## 2018-08-13 DIAGNOSIS — Z00.00 HEALTHCARE MAINTENANCE: ICD-10-CM

## 2018-08-13 LAB
ANION GAP SERPL CALCULATED.3IONS-SCNC: 11 MMOL/L (ref 5–18)
BUN SERPL-MCNC: 17 MG/DL (ref 8–28)
CALCIUM SERPL-MCNC: 9.5 MG/DL (ref 8.5–10.5)
CHLORIDE BLD-SCNC: 104 MMOL/L (ref 98–107)
CHOLEST SERPL-MCNC: 197 MG/DL
CO2 SERPL-SCNC: 25 MMOL/L (ref 22–31)
CREAT SERPL-MCNC: 0.77 MG/DL (ref 0.6–1.1)
DIGOXIN LEVEL LHE- HISTORICAL: 0.5 NG/ML (ref 0.5–2)
FASTING STATUS PATIENT QL REPORTED: NO
GFR SERPL CREATININE-BSD FRML MDRD: >60 ML/MIN/1.73M2
GLUCOSE BLD-MCNC: 84 MG/DL (ref 70–125)
HDLC SERPL-MCNC: 35 MG/DL
INR PPP: 3.4 (ref 0.9–1.1)
LDLC SERPL CALC-MCNC: 121 MG/DL
MAGNESIUM SERPL-MCNC: 2.2 MG/DL (ref 1.8–2.6)
POTASSIUM BLD-SCNC: 4.1 MMOL/L (ref 3.5–5)
SODIUM SERPL-SCNC: 140 MMOL/L (ref 136–145)
TRIGL SERPL-MCNC: 207 MG/DL

## 2018-08-14 ENCOUNTER — COMMUNICATION - HEALTHEAST (OUTPATIENT)
Dept: INTERNAL MEDICINE | Facility: CLINIC | Age: 83
End: 2018-08-14

## 2018-08-28 ENCOUNTER — AMBULATORY - HEALTHEAST (OUTPATIENT)
Dept: LAB | Facility: CLINIC | Age: 83
End: 2018-08-28

## 2018-08-28 ENCOUNTER — COMMUNICATION - HEALTHEAST (OUTPATIENT)
Dept: ANTICOAGULATION | Facility: CLINIC | Age: 83
End: 2018-08-28

## 2018-08-28 DIAGNOSIS — I48.20 CHRONIC ATRIAL FIBRILLATION (H): ICD-10-CM

## 2018-08-28 LAB — INR PPP: 2.1 (ref 0.9–1.1)

## 2018-09-11 ENCOUNTER — COMMUNICATION - HEALTHEAST (OUTPATIENT)
Dept: ANTICOAGULATION | Facility: CLINIC | Age: 83
End: 2018-09-11

## 2018-09-11 ENCOUNTER — AMBULATORY - HEALTHEAST (OUTPATIENT)
Dept: LAB | Facility: CLINIC | Age: 83
End: 2018-09-11

## 2018-09-11 DIAGNOSIS — I48.20 CHRONIC ATRIAL FIBRILLATION (H): ICD-10-CM

## 2018-09-11 LAB — INR PPP: 1.7 (ref 0.9–1.1)

## 2018-09-18 ENCOUNTER — COMMUNICATION - HEALTHEAST (OUTPATIENT)
Dept: ANTICOAGULATION | Facility: CLINIC | Age: 83
End: 2018-09-18

## 2018-09-18 ENCOUNTER — AMBULATORY - HEALTHEAST (OUTPATIENT)
Dept: LAB | Facility: CLINIC | Age: 83
End: 2018-09-18

## 2018-09-18 DIAGNOSIS — I48.20 CHRONIC ATRIAL FIBRILLATION (H): ICD-10-CM

## 2018-09-18 LAB — INR PPP: 2.3 (ref 0.9–1.1)

## 2018-09-21 ENCOUNTER — COMMUNICATION - HEALTHEAST (OUTPATIENT)
Dept: INTERNAL MEDICINE | Facility: CLINIC | Age: 83
End: 2018-09-21

## 2018-09-21 DIAGNOSIS — I10 ESSENTIAL HYPERTENSION: ICD-10-CM

## 2018-09-21 DIAGNOSIS — R60.9 EDEMA: ICD-10-CM

## 2018-09-24 ENCOUNTER — COMMUNICATION - HEALTHEAST (OUTPATIENT)
Dept: INTERNAL MEDICINE | Facility: CLINIC | Age: 83
End: 2018-09-24

## 2018-09-24 DIAGNOSIS — I48.19 PERSISTENT ATRIAL FIBRILLATION (H): ICD-10-CM

## 2018-10-03 ENCOUNTER — COMMUNICATION - HEALTHEAST (OUTPATIENT)
Dept: ANTICOAGULATION | Facility: CLINIC | Age: 83
End: 2018-10-03

## 2018-10-03 ENCOUNTER — AMBULATORY - HEALTHEAST (OUTPATIENT)
Dept: LAB | Facility: CLINIC | Age: 83
End: 2018-10-03

## 2018-10-03 DIAGNOSIS — I48.20 CHRONIC ATRIAL FIBRILLATION (H): ICD-10-CM

## 2018-10-03 LAB — INR PPP: 1.9 (ref 0.9–1.1)

## 2018-10-10 ENCOUNTER — COMMUNICATION - HEALTHEAST (OUTPATIENT)
Dept: INTERNAL MEDICINE | Facility: CLINIC | Age: 83
End: 2018-10-10

## 2018-10-10 ENCOUNTER — AMBULATORY - HEALTHEAST (OUTPATIENT)
Dept: LAB | Facility: CLINIC | Age: 83
End: 2018-10-10

## 2018-10-10 ENCOUNTER — COMMUNICATION - HEALTHEAST (OUTPATIENT)
Dept: ANTICOAGULATION | Facility: CLINIC | Age: 83
End: 2018-10-10

## 2018-10-10 DIAGNOSIS — Z23 FLU VACCINE NEED: ICD-10-CM

## 2018-10-10 DIAGNOSIS — I48.20 CHRONIC ATRIAL FIBRILLATION (H): ICD-10-CM

## 2018-10-10 LAB — INR PPP: 2.3 (ref 0.9–1.1)

## 2018-10-15 ENCOUNTER — COMMUNICATION - HEALTHEAST (OUTPATIENT)
Dept: CARDIOLOGY | Facility: CLINIC | Age: 83
End: 2018-10-15

## 2018-10-15 DIAGNOSIS — I48.20 CHRONIC ATRIAL FIBRILLATION (H): ICD-10-CM

## 2018-10-24 ENCOUNTER — COMMUNICATION - HEALTHEAST (OUTPATIENT)
Dept: ANTICOAGULATION | Facility: CLINIC | Age: 83
End: 2018-10-24

## 2018-10-24 ENCOUNTER — AMBULATORY - HEALTHEAST (OUTPATIENT)
Dept: LAB | Facility: CLINIC | Age: 83
End: 2018-10-24

## 2018-10-24 DIAGNOSIS — I48.20 CHRONIC ATRIAL FIBRILLATION (H): ICD-10-CM

## 2018-10-24 LAB — INR PPP: 2.2 (ref 0.9–1.1)

## 2018-11-07 ENCOUNTER — AMBULATORY - HEALTHEAST (OUTPATIENT)
Dept: LAB | Facility: CLINIC | Age: 83
End: 2018-11-07

## 2018-11-07 ENCOUNTER — COMMUNICATION - HEALTHEAST (OUTPATIENT)
Dept: ANTICOAGULATION | Facility: CLINIC | Age: 83
End: 2018-11-07

## 2018-11-07 DIAGNOSIS — I48.20 CHRONIC ATRIAL FIBRILLATION (H): ICD-10-CM

## 2018-11-07 LAB — INR PPP: 2.3 (ref 0.9–1.1)

## 2018-11-26 ENCOUNTER — COMMUNICATION - HEALTHEAST (OUTPATIENT)
Dept: INTERNAL MEDICINE | Facility: CLINIC | Age: 83
End: 2018-11-26

## 2018-11-27 ENCOUNTER — OFFICE VISIT - HEALTHEAST (OUTPATIENT)
Dept: INTERNAL MEDICINE | Facility: CLINIC | Age: 83
End: 2018-11-27

## 2018-11-27 ENCOUNTER — COMMUNICATION - HEALTHEAST (OUTPATIENT)
Dept: INTERNAL MEDICINE | Facility: CLINIC | Age: 83
End: 2018-11-27

## 2018-11-27 ENCOUNTER — AMBULATORY - HEALTHEAST (OUTPATIENT)
Dept: LAB | Facility: CLINIC | Age: 83
End: 2018-11-27

## 2018-11-27 ENCOUNTER — COMMUNICATION - HEALTHEAST (OUTPATIENT)
Dept: ANTICOAGULATION | Facility: CLINIC | Age: 83
End: 2018-11-27

## 2018-11-27 DIAGNOSIS — I48.20 CHRONIC ATRIAL FIBRILLATION (H): ICD-10-CM

## 2018-11-27 DIAGNOSIS — M54.50 ACUTE MIDLINE LOW BACK PAIN WITHOUT SCIATICA: ICD-10-CM

## 2018-11-27 LAB — INR PPP: 3.1 (ref 0.9–1.1)

## 2018-12-03 ENCOUNTER — COMMUNICATION - HEALTHEAST (OUTPATIENT)
Dept: SCHEDULING | Facility: CLINIC | Age: 83
End: 2018-12-03

## 2018-12-03 DIAGNOSIS — M81.0 SENILE OSTEOPOROSIS: ICD-10-CM

## 2018-12-03 DIAGNOSIS — M54.50 ACUTE MIDLINE LOW BACK PAIN WITHOUT SCIATICA: ICD-10-CM

## 2018-12-11 ENCOUNTER — RECORDS - HEALTHEAST (OUTPATIENT)
Dept: GENERAL RADIOLOGY | Facility: CLINIC | Age: 83
End: 2018-12-11

## 2018-12-11 ENCOUNTER — AMBULATORY - HEALTHEAST (OUTPATIENT)
Dept: LAB | Facility: CLINIC | Age: 83
End: 2018-12-11

## 2018-12-11 ENCOUNTER — COMMUNICATION - HEALTHEAST (OUTPATIENT)
Dept: ANTICOAGULATION | Facility: CLINIC | Age: 83
End: 2018-12-11

## 2018-12-11 DIAGNOSIS — I48.20 CHRONIC ATRIAL FIBRILLATION (H): ICD-10-CM

## 2018-12-11 DIAGNOSIS — M54.50 LOW BACK PAIN: ICD-10-CM

## 2018-12-11 DIAGNOSIS — M81.0 AGE-RELATED OSTEOPOROSIS WITHOUT CURRENT PATHOLOGICAL FRACTURE: ICD-10-CM

## 2018-12-11 LAB — INR PPP: 2.6 (ref 0.9–1.1)

## 2018-12-13 ENCOUNTER — AMBULATORY - HEALTHEAST (OUTPATIENT)
Dept: INTERNAL MEDICINE | Facility: CLINIC | Age: 83
End: 2018-12-13

## 2018-12-13 ENCOUNTER — COMMUNICATION - HEALTHEAST (OUTPATIENT)
Dept: INTERNAL MEDICINE | Facility: CLINIC | Age: 83
End: 2018-12-13

## 2018-12-13 ENCOUNTER — COMMUNICATION - HEALTHEAST (OUTPATIENT)
Dept: ANTICOAGULATION | Facility: CLINIC | Age: 83
End: 2018-12-13

## 2018-12-13 DIAGNOSIS — I48.20 CHRONIC ATRIAL FIBRILLATION (H): ICD-10-CM

## 2018-12-13 DIAGNOSIS — M54.50 MIDLINE LOW BACK PAIN WITHOUT SCIATICA, UNSPECIFIED CHRONICITY: ICD-10-CM

## 2018-12-27 ENCOUNTER — COMMUNICATION - HEALTHEAST (OUTPATIENT)
Dept: ANTICOAGULATION | Facility: CLINIC | Age: 83
End: 2018-12-27

## 2018-12-27 ENCOUNTER — AMBULATORY - HEALTHEAST (OUTPATIENT)
Dept: LAB | Facility: CLINIC | Age: 83
End: 2018-12-27

## 2018-12-27 DIAGNOSIS — I48.20 CHRONIC ATRIAL FIBRILLATION (H): ICD-10-CM

## 2018-12-27 LAB — INR PPP: 2.6 (ref 0.9–1.1)

## 2019-01-17 ENCOUNTER — COMMUNICATION - HEALTHEAST (OUTPATIENT)
Dept: ANTICOAGULATION | Facility: CLINIC | Age: 84
End: 2019-01-17

## 2019-01-17 ENCOUNTER — COMMUNICATION - HEALTHEAST (OUTPATIENT)
Dept: SCHEDULING | Facility: CLINIC | Age: 84
End: 2019-01-17

## 2019-01-17 ENCOUNTER — COMMUNICATION - HEALTHEAST (OUTPATIENT)
Dept: CARDIOLOGY | Facility: CLINIC | Age: 84
End: 2019-01-17

## 2019-01-17 ENCOUNTER — AMBULATORY - HEALTHEAST (OUTPATIENT)
Dept: NURSING | Facility: CLINIC | Age: 84
End: 2019-01-17

## 2019-01-17 ENCOUNTER — AMBULATORY - HEALTHEAST (OUTPATIENT)
Dept: LAB | Facility: CLINIC | Age: 84
End: 2019-01-17

## 2019-01-17 DIAGNOSIS — I48.20 CHRONIC ATRIAL FIBRILLATION (H): ICD-10-CM

## 2019-01-17 DIAGNOSIS — R00.1 BRADYCARDIA: ICD-10-CM

## 2019-01-17 LAB — INR PPP: 1.9 (ref 0.9–1.1)

## 2019-01-25 ENCOUNTER — OFFICE VISIT - HEALTHEAST (OUTPATIENT)
Dept: INTERNAL MEDICINE | Facility: CLINIC | Age: 84
End: 2019-01-25

## 2019-01-25 DIAGNOSIS — I10 ESSENTIAL HYPERTENSION: ICD-10-CM

## 2019-01-25 DIAGNOSIS — R60.9 EDEMA: ICD-10-CM

## 2019-01-25 DIAGNOSIS — I48.20 CHRONIC ATRIAL FIBRILLATION (H): ICD-10-CM

## 2019-01-29 ENCOUNTER — AMBULATORY - HEALTHEAST (OUTPATIENT)
Dept: LAB | Facility: CLINIC | Age: 84
End: 2019-01-29

## 2019-01-29 ENCOUNTER — COMMUNICATION - HEALTHEAST (OUTPATIENT)
Dept: ANTICOAGULATION | Facility: CLINIC | Age: 84
End: 2019-01-29

## 2019-01-29 ENCOUNTER — COMMUNICATION - HEALTHEAST (OUTPATIENT)
Dept: INTERNAL MEDICINE | Facility: CLINIC | Age: 84
End: 2019-01-29

## 2019-01-29 DIAGNOSIS — I48.20 CHRONIC ATRIAL FIBRILLATION (H): ICD-10-CM

## 2019-01-29 LAB — INR PPP: 2.3 (ref 0.9–1.1)

## 2019-02-19 ENCOUNTER — COMMUNICATION - HEALTHEAST (OUTPATIENT)
Dept: INTERNAL MEDICINE | Facility: CLINIC | Age: 84
End: 2019-02-19

## 2019-02-19 ENCOUNTER — OFFICE VISIT - HEALTHEAST (OUTPATIENT)
Dept: INTERNAL MEDICINE | Facility: CLINIC | Age: 84
End: 2019-02-19

## 2019-02-19 ENCOUNTER — COMMUNICATION - HEALTHEAST (OUTPATIENT)
Dept: ANTICOAGULATION | Facility: CLINIC | Age: 84
End: 2019-02-19

## 2019-02-19 DIAGNOSIS — R06.02 SHORTNESS OF BREATH: ICD-10-CM

## 2019-02-19 DIAGNOSIS — I48.20 CHRONIC ATRIAL FIBRILLATION (H): ICD-10-CM

## 2019-02-19 DIAGNOSIS — M81.0 AGE-RELATED OSTEOPOROSIS WITHOUT CURRENT PATHOLOGICAL FRACTURE: ICD-10-CM

## 2019-02-19 DIAGNOSIS — Z51.81 ENCOUNTER FOR MEDICATION MONITORING: ICD-10-CM

## 2019-02-19 LAB
ANION GAP SERPL CALCULATED.3IONS-SCNC: 11 MMOL/L (ref 5–18)
ATRIAL RATE - MUSE: NORMAL BPM
BNP SERPL-MCNC: 580 PG/ML (ref 0–167)
BUN SERPL-MCNC: 26 MG/DL (ref 8–28)
CALCIUM SERPL-MCNC: 9.8 MG/DL (ref 8.5–10.5)
CHLORIDE BLD-SCNC: 105 MMOL/L (ref 98–107)
CO2 SERPL-SCNC: 25 MMOL/L (ref 22–31)
CREAT SERPL-MCNC: 1.05 MG/DL (ref 0.6–1.1)
DIASTOLIC BLOOD PRESSURE - MUSE: NORMAL MMHG
ERYTHROCYTE [DISTWIDTH] IN BLOOD BY AUTOMATED COUNT: 12.9 % (ref 11–14.5)
GFR SERPL CREATININE-BSD FRML MDRD: 49 ML/MIN/1.73M2
GLUCOSE BLD-MCNC: 96 MG/DL (ref 70–125)
HCT VFR BLD AUTO: 42.2 % (ref 35–47)
HGB BLD-MCNC: 13.6 G/DL (ref 12–16)
INR PPP: 3.4 (ref 0.9–1.1)
INTERPRETATION ECG - MUSE: NORMAL
MCH RBC QN AUTO: 28.3 PG (ref 27–34)
MCHC RBC AUTO-ENTMCNC: 32.3 G/DL (ref 32–36)
MCV RBC AUTO: 88 FL (ref 80–100)
P AXIS - MUSE: NORMAL DEGREES
PLATELET # BLD AUTO: 185 THOU/UL (ref 140–440)
PMV BLD AUTO: 8.8 FL (ref 7–10)
POTASSIUM BLD-SCNC: 4.1 MMOL/L (ref 3.5–5)
PR INTERVAL - MUSE: NORMAL MS
QRS DURATION - MUSE: 98 MS
QT - MUSE: 522 MS
QTC - MUSE: 425 MS
R AXIS - MUSE: 72 DEGREES
RBC # BLD AUTO: 4.81 MILL/UL (ref 3.8–5.4)
SODIUM SERPL-SCNC: 141 MMOL/L (ref 136–145)
SYSTOLIC BLOOD PRESSURE - MUSE: NORMAL MMHG
T AXIS - MUSE: -81 DEGREES
VENTRICULAR RATE- MUSE: 40 BPM
WBC: 9.6 THOU/UL (ref 4–11)

## 2019-02-20 ENCOUNTER — COMMUNICATION - HEALTHEAST (OUTPATIENT)
Dept: INTERNAL MEDICINE | Facility: CLINIC | Age: 84
End: 2019-02-20

## 2019-02-26 ENCOUNTER — AMBULATORY - HEALTHEAST (OUTPATIENT)
Dept: LAB | Facility: CLINIC | Age: 84
End: 2019-02-26

## 2019-02-26 ENCOUNTER — HOSPITAL ENCOUNTER (OUTPATIENT)
Dept: CARDIOLOGY | Facility: HOSPITAL | Age: 84
Discharge: HOME | End: 2019-02-26
Attending: INTERNAL MEDICINE

## 2019-02-26 ENCOUNTER — COMMUNICATION - HEALTHEAST (OUTPATIENT)
Dept: ANTICOAGULATION | Facility: CLINIC | Age: 84
End: 2019-02-26

## 2019-02-26 ENCOUNTER — COMMUNICATION - HEALTHEAST (OUTPATIENT)
Dept: INTERNAL MEDICINE | Facility: CLINIC | Age: 84
End: 2019-02-26

## 2019-02-26 DIAGNOSIS — I48.20 CHRONIC ATRIAL FIBRILLATION (H): ICD-10-CM

## 2019-02-26 DIAGNOSIS — R06.02 SHORTNESS OF BREATH: ICD-10-CM

## 2019-02-26 LAB — INR PPP: 2.5 (ref 0.9–1.1)

## 2019-02-27 ENCOUNTER — RECORDS - HEALTHEAST (OUTPATIENT)
Dept: ADMINISTRATIVE | Facility: OTHER | Age: 84
End: 2019-02-27

## 2019-02-27 LAB — INR PPP: 2.7 (ref 0.9–1.1)

## 2019-02-28 ENCOUNTER — RECORDS - HEALTHEAST (OUTPATIENT)
Dept: ADMINISTRATIVE | Facility: OTHER | Age: 84
End: 2019-02-28

## 2019-02-28 LAB — INR PPP: 2.3 (ref 0.9–1.1)

## 2019-03-02 ENCOUNTER — RECORDS - HEALTHEAST (OUTPATIENT)
Dept: ADMINISTRATIVE | Facility: OTHER | Age: 84
End: 2019-03-02

## 2019-03-02 LAB — INR PPP: 1.5 (ref 0.9–1.1)

## 2019-03-04 ENCOUNTER — AMBULATORY - HEALTHEAST (OUTPATIENT)
Dept: LAB | Facility: CLINIC | Age: 84
End: 2019-03-04

## 2019-03-04 ENCOUNTER — COMMUNICATION - HEALTHEAST (OUTPATIENT)
Dept: SCHEDULING | Facility: CLINIC | Age: 84
End: 2019-03-04

## 2019-03-04 ENCOUNTER — COMMUNICATION - HEALTHEAST (OUTPATIENT)
Dept: ANTICOAGULATION | Facility: CLINIC | Age: 84
End: 2019-03-04

## 2019-03-04 DIAGNOSIS — I48.20 CHRONIC ATRIAL FIBRILLATION (H): ICD-10-CM

## 2019-03-04 LAB — INR PPP: 1.4 (ref 0.9–1.1)

## 2019-03-05 ENCOUNTER — COMMUNICATION - HEALTHEAST (OUTPATIENT)
Dept: INTERNAL MEDICINE | Facility: CLINIC | Age: 84
End: 2019-03-05

## 2019-03-05 DIAGNOSIS — E87.6 DIURETIC-INDUCED HYPOKALEMIA: ICD-10-CM

## 2019-03-05 DIAGNOSIS — T50.2X5A DIURETIC-INDUCED HYPOKALEMIA: ICD-10-CM

## 2019-03-08 ENCOUNTER — COMMUNICATION - HEALTHEAST (OUTPATIENT)
Dept: INTERNAL MEDICINE | Facility: CLINIC | Age: 84
End: 2019-03-08

## 2019-03-08 ENCOUNTER — COMMUNICATION - HEALTHEAST (OUTPATIENT)
Dept: ANTICOAGULATION | Facility: CLINIC | Age: 84
End: 2019-03-08

## 2019-03-08 ENCOUNTER — AMBULATORY - HEALTHEAST (OUTPATIENT)
Dept: LAB | Facility: CLINIC | Age: 84
End: 2019-03-08

## 2019-03-08 DIAGNOSIS — I48.20 CHRONIC ATRIAL FIBRILLATION (H): ICD-10-CM

## 2019-03-08 LAB — INR PPP: 1.6 (ref 0.9–1.1)

## 2019-03-12 ENCOUNTER — COMMUNICATION - HEALTHEAST (OUTPATIENT)
Dept: ANTICOAGULATION | Facility: CLINIC | Age: 84
End: 2019-03-12

## 2019-03-12 ENCOUNTER — OFFICE VISIT - HEALTHEAST (OUTPATIENT)
Dept: INTERNAL MEDICINE | Facility: CLINIC | Age: 84
End: 2019-03-12

## 2019-03-12 DIAGNOSIS — Z95.810 ICD (IMPLANTABLE CARDIOVERTER-DEFIBRILLATOR), SINGLE, IN SITU: ICD-10-CM

## 2019-03-12 DIAGNOSIS — I48.19 PERSISTENT ATRIAL FIBRILLATION (H): ICD-10-CM

## 2019-03-12 DIAGNOSIS — I48.20 CHRONIC ATRIAL FIBRILLATION (H): ICD-10-CM

## 2019-03-12 DIAGNOSIS — I47.21 TORSADES DE POINTES (H): ICD-10-CM

## 2019-03-12 DIAGNOSIS — I44.2 COMPLETE HEART BLOCK (H): ICD-10-CM

## 2019-03-12 DIAGNOSIS — Z51.81 ENCOUNTER FOR MEDICATION MONITORING: ICD-10-CM

## 2019-03-12 DIAGNOSIS — Z51.81 MEDICATION MONITORING ENCOUNTER: ICD-10-CM

## 2019-03-12 LAB
ANION GAP SERPL CALCULATED.3IONS-SCNC: 7 MMOL/L (ref 5–18)
BUN SERPL-MCNC: 25 MG/DL (ref 8–28)
CALCIUM SERPL-MCNC: 9.6 MG/DL (ref 8.5–10.5)
CHLORIDE BLD-SCNC: 104 MMOL/L (ref 98–107)
CO2 SERPL-SCNC: 28 MMOL/L (ref 22–31)
CREAT SERPL-MCNC: 0.97 MG/DL (ref 0.6–1.1)
GFR SERPL CREATININE-BSD FRML MDRD: 54 ML/MIN/1.73M2
GLUCOSE BLD-MCNC: 89 MG/DL (ref 70–125)
INR PPP: 1.4 (ref 0.9–1.1)
POTASSIUM BLD-SCNC: 4.4 MMOL/L (ref 3.5–5)
SODIUM SERPL-SCNC: 139 MMOL/L (ref 136–145)

## 2019-03-12 ASSESSMENT — MIFFLIN-ST. JEOR: SCORE: 932.31

## 2019-03-13 ENCOUNTER — COMMUNICATION - HEALTHEAST (OUTPATIENT)
Dept: INTERNAL MEDICINE | Facility: CLINIC | Age: 84
End: 2019-03-13

## 2019-03-14 ENCOUNTER — COMMUNICATION - HEALTHEAST (OUTPATIENT)
Dept: INTERNAL MEDICINE | Facility: CLINIC | Age: 84
End: 2019-03-14

## 2019-03-19 ENCOUNTER — AMBULATORY - HEALTHEAST (OUTPATIENT)
Dept: LAB | Facility: CLINIC | Age: 84
End: 2019-03-19

## 2019-03-19 ENCOUNTER — COMMUNICATION - HEALTHEAST (OUTPATIENT)
Dept: ANTICOAGULATION | Facility: CLINIC | Age: 84
End: 2019-03-19

## 2019-03-19 DIAGNOSIS — I48.20 CHRONIC ATRIAL FIBRILLATION (H): ICD-10-CM

## 2019-03-19 LAB — INR PPP: 2.1 (ref 0.9–1.1)

## 2019-03-26 ENCOUNTER — AMBULATORY - HEALTHEAST (OUTPATIENT)
Dept: LAB | Facility: CLINIC | Age: 84
End: 2019-03-26

## 2019-03-26 ENCOUNTER — COMMUNICATION - HEALTHEAST (OUTPATIENT)
Dept: ANTICOAGULATION | Facility: CLINIC | Age: 84
End: 2019-03-26

## 2019-03-26 DIAGNOSIS — I48.20 CHRONIC ATRIAL FIBRILLATION (H): ICD-10-CM

## 2019-03-26 LAB — INR PPP: 2.5 (ref 0.9–1.1)

## 2019-03-27 ENCOUNTER — COMMUNICATION - HEALTHEAST (OUTPATIENT)
Dept: INTERNAL MEDICINE | Facility: CLINIC | Age: 84
End: 2019-03-27

## 2019-03-27 DIAGNOSIS — I48.20 CHRONIC ATRIAL FIBRILLATION (H): ICD-10-CM

## 2019-03-28 ENCOUNTER — COMMUNICATION - HEALTHEAST (OUTPATIENT)
Dept: INTERNAL MEDICINE | Facility: CLINIC | Age: 84
End: 2019-03-28

## 2019-03-28 DIAGNOSIS — I10 ESSENTIAL HYPERTENSION, BENIGN: ICD-10-CM

## 2019-03-28 DIAGNOSIS — I48.20 CHRONIC ATRIAL FIBRILLATION (H): ICD-10-CM

## 2019-04-09 ENCOUNTER — COMMUNICATION - HEALTHEAST (OUTPATIENT)
Dept: ANTICOAGULATION | Facility: CLINIC | Age: 84
End: 2019-04-09

## 2019-04-09 ENCOUNTER — COMMUNICATION - HEALTHEAST (OUTPATIENT)
Dept: INTERNAL MEDICINE | Facility: CLINIC | Age: 84
End: 2019-04-09

## 2019-04-09 ENCOUNTER — AMBULATORY - HEALTHEAST (OUTPATIENT)
Dept: LAB | Facility: CLINIC | Age: 84
End: 2019-04-09

## 2019-04-09 DIAGNOSIS — I48.20 CHRONIC ATRIAL FIBRILLATION (H): ICD-10-CM

## 2019-04-09 LAB — INR PPP: 2.5 (ref 0.9–1.1)

## 2019-04-24 ENCOUNTER — COMMUNICATION - HEALTHEAST (OUTPATIENT)
Dept: INTERNAL MEDICINE | Facility: CLINIC | Age: 84
End: 2019-04-24

## 2019-04-24 DIAGNOSIS — K21.9 ESOPHAGEAL REFLUX: ICD-10-CM

## 2019-04-30 ENCOUNTER — AMBULATORY - HEALTHEAST (OUTPATIENT)
Dept: LAB | Facility: CLINIC | Age: 84
End: 2019-04-30

## 2019-04-30 ENCOUNTER — COMMUNICATION - HEALTHEAST (OUTPATIENT)
Dept: ANTICOAGULATION | Facility: CLINIC | Age: 84
End: 2019-04-30

## 2019-04-30 DIAGNOSIS — I48.20 CHRONIC ATRIAL FIBRILLATION (H): ICD-10-CM

## 2019-04-30 LAB — INR PPP: 2.6 (ref 0.9–1.1)

## 2019-05-06 ENCOUNTER — COMMUNICATION - HEALTHEAST (OUTPATIENT)
Dept: INTERNAL MEDICINE | Facility: CLINIC | Age: 84
End: 2019-05-06

## 2019-05-17 ENCOUNTER — COMMUNICATION - HEALTHEAST (OUTPATIENT)
Dept: ANTICOAGULATION | Facility: CLINIC | Age: 84
End: 2019-05-17

## 2019-05-17 ENCOUNTER — AMBULATORY - HEALTHEAST (OUTPATIENT)
Dept: LAB | Facility: CLINIC | Age: 84
End: 2019-05-17

## 2019-05-17 DIAGNOSIS — I48.20 CHRONIC ATRIAL FIBRILLATION (H): ICD-10-CM

## 2019-05-17 LAB — INR PPP: 1.8 (ref 0.9–1.1)

## 2019-05-22 ENCOUNTER — RECORDS - HEALTHEAST (OUTPATIENT)
Dept: ADMINISTRATIVE | Facility: OTHER | Age: 84
End: 2019-05-22

## 2019-05-23 ENCOUNTER — TRANSFERRED RECORDS (OUTPATIENT)
Dept: HEALTH INFORMATION MANAGEMENT | Facility: CLINIC | Age: 84
End: 2019-05-23

## 2019-05-24 ENCOUNTER — AMBULATORY - HEALTHEAST (OUTPATIENT)
Dept: LAB | Facility: CLINIC | Age: 84
End: 2019-05-24

## 2019-05-24 ENCOUNTER — COMMUNICATION - HEALTHEAST (OUTPATIENT)
Dept: SCHEDULING | Facility: CLINIC | Age: 84
End: 2019-05-24

## 2019-05-24 ENCOUNTER — COMMUNICATION - HEALTHEAST (OUTPATIENT)
Dept: ANTICOAGULATION | Facility: CLINIC | Age: 84
End: 2019-05-24

## 2019-05-24 DIAGNOSIS — I48.20 CHRONIC ATRIAL FIBRILLATION (H): ICD-10-CM

## 2019-05-24 DIAGNOSIS — I48.19 PERSISTENT ATRIAL FIBRILLATION (H): ICD-10-CM

## 2019-05-24 LAB — INR PPP: 3 (ref 0.9–1.1)

## 2019-05-28 ENCOUNTER — COMMUNICATION - HEALTHEAST (OUTPATIENT)
Dept: INTERNAL MEDICINE | Facility: CLINIC | Age: 84
End: 2019-05-28

## 2019-05-31 ENCOUNTER — COMMUNICATION - HEALTHEAST (OUTPATIENT)
Dept: ANTICOAGULATION | Facility: CLINIC | Age: 84
End: 2019-05-31

## 2019-05-31 ENCOUNTER — OFFICE VISIT - HEALTHEAST (OUTPATIENT)
Dept: INTERNAL MEDICINE | Facility: CLINIC | Age: 84
End: 2019-05-31

## 2019-05-31 DIAGNOSIS — I48.20 CHRONIC ATRIAL FIBRILLATION (H): ICD-10-CM

## 2019-05-31 DIAGNOSIS — M54.16 LUMBAR RADICULOPATHY: ICD-10-CM

## 2019-05-31 DIAGNOSIS — I50.32 DIASTOLIC CHF, CHRONIC (H): ICD-10-CM

## 2019-05-31 LAB — INR PPP: 2.1 (ref 0.9–1.1)

## 2019-05-31 ASSESSMENT — MIFFLIN-ST. JEOR: SCORE: 954.99

## 2019-06-04 ENCOUNTER — COMMUNICATION - HEALTHEAST (OUTPATIENT)
Dept: INTERNAL MEDICINE | Facility: CLINIC | Age: 84
End: 2019-06-04

## 2019-06-04 DIAGNOSIS — I48.20 CHRONIC ATRIAL FIBRILLATION (H): ICD-10-CM

## 2019-06-11 ENCOUNTER — COMMUNICATION - HEALTHEAST (OUTPATIENT)
Dept: ANTICOAGULATION | Facility: CLINIC | Age: 84
End: 2019-06-11

## 2019-06-11 ENCOUNTER — COMMUNICATION - HEALTHEAST (OUTPATIENT)
Dept: INTERNAL MEDICINE | Facility: CLINIC | Age: 84
End: 2019-06-11

## 2019-06-11 ENCOUNTER — AMBULATORY - HEALTHEAST (OUTPATIENT)
Dept: LAB | Facility: CLINIC | Age: 84
End: 2019-06-11

## 2019-06-11 DIAGNOSIS — I48.20 CHRONIC ATRIAL FIBRILLATION (H): ICD-10-CM

## 2019-06-11 LAB — INR PPP: 1.9 (ref 0.9–1.1)

## 2019-06-18 ENCOUNTER — COMMUNICATION - HEALTHEAST (OUTPATIENT)
Dept: ANTICOAGULATION | Facility: CLINIC | Age: 84
End: 2019-06-18

## 2019-06-18 ENCOUNTER — AMBULATORY - HEALTHEAST (OUTPATIENT)
Dept: NURSING | Facility: CLINIC | Age: 84
End: 2019-06-18

## 2019-06-18 ENCOUNTER — AMBULATORY - HEALTHEAST (OUTPATIENT)
Dept: LAB | Facility: CLINIC | Age: 84
End: 2019-06-18

## 2019-06-18 DIAGNOSIS — I48.20 CHRONIC ATRIAL FIBRILLATION (H): ICD-10-CM

## 2019-06-18 DIAGNOSIS — Z00.00 HEALTHCARE MAINTENANCE: ICD-10-CM

## 2019-06-18 LAB — INR PPP: 3.3 (ref 0.9–1.1)

## 2019-06-21 ENCOUNTER — RECORDS - HEALTHEAST (OUTPATIENT)
Dept: ADMINISTRATIVE | Facility: OTHER | Age: 84
End: 2019-06-21

## 2019-07-02 ENCOUNTER — COMMUNICATION - HEALTHEAST (OUTPATIENT)
Dept: ANTICOAGULATION | Facility: CLINIC | Age: 84
End: 2019-07-02

## 2019-07-02 ENCOUNTER — AMBULATORY - HEALTHEAST (OUTPATIENT)
Dept: LAB | Facility: CLINIC | Age: 84
End: 2019-07-02

## 2019-07-02 DIAGNOSIS — I48.20 CHRONIC ATRIAL FIBRILLATION (H): ICD-10-CM

## 2019-07-02 LAB — INR PPP: 3.4 (ref 0.9–1.1)

## 2019-07-03 ENCOUNTER — COMMUNICATION - HEALTHEAST (OUTPATIENT)
Dept: INTERNAL MEDICINE | Facility: CLINIC | Age: 84
End: 2019-07-03

## 2019-07-03 DIAGNOSIS — I48.20 CHRONIC ATRIAL FIBRILLATION (H): ICD-10-CM

## 2019-07-16 ENCOUNTER — COMMUNICATION - HEALTHEAST (OUTPATIENT)
Dept: ANTICOAGULATION | Facility: CLINIC | Age: 84
End: 2019-07-16

## 2019-07-16 ENCOUNTER — AMBULATORY - HEALTHEAST (OUTPATIENT)
Dept: LAB | Facility: CLINIC | Age: 84
End: 2019-07-16

## 2019-07-16 DIAGNOSIS — I48.20 CHRONIC ATRIAL FIBRILLATION (H): ICD-10-CM

## 2019-07-16 LAB — INR PPP: 1.5 (ref 0.9–1.1)

## 2019-07-23 ENCOUNTER — AMBULATORY - HEALTHEAST (OUTPATIENT)
Dept: LAB | Facility: CLINIC | Age: 84
End: 2019-07-23

## 2019-07-23 ENCOUNTER — COMMUNICATION - HEALTHEAST (OUTPATIENT)
Dept: ANTICOAGULATION | Facility: CLINIC | Age: 84
End: 2019-07-23

## 2019-07-23 DIAGNOSIS — I48.20 CHRONIC ATRIAL FIBRILLATION (H): ICD-10-CM

## 2019-07-23 LAB — INR PPP: 1.9 (ref 0.9–1.1)

## 2019-08-06 ENCOUNTER — COMMUNICATION - HEALTHEAST (OUTPATIENT)
Dept: ANTICOAGULATION | Facility: CLINIC | Age: 84
End: 2019-08-06

## 2019-08-06 ENCOUNTER — AMBULATORY - HEALTHEAST (OUTPATIENT)
Dept: LAB | Facility: CLINIC | Age: 84
End: 2019-08-06

## 2019-08-06 DIAGNOSIS — I48.20 CHRONIC ATRIAL FIBRILLATION (H): ICD-10-CM

## 2019-08-06 LAB — INR PPP: 1.7 (ref 0.9–1.1)

## 2019-08-07 ENCOUNTER — COMMUNICATION - HEALTHEAST (OUTPATIENT)
Dept: INTERNAL MEDICINE | Facility: CLINIC | Age: 84
End: 2019-08-07

## 2019-08-07 DIAGNOSIS — I48.19 PERSISTENT ATRIAL FIBRILLATION (H): ICD-10-CM

## 2019-08-13 ENCOUNTER — OFFICE VISIT - HEALTHEAST (OUTPATIENT)
Dept: INTERNAL MEDICINE | Facility: CLINIC | Age: 84
End: 2019-08-13

## 2019-08-13 ENCOUNTER — COMMUNICATION - HEALTHEAST (OUTPATIENT)
Dept: ANTICOAGULATION | Facility: CLINIC | Age: 84
End: 2019-08-13

## 2019-08-13 DIAGNOSIS — I10 ESSENTIAL HYPERTENSION: ICD-10-CM

## 2019-08-13 DIAGNOSIS — M85.89 OSTEOPENIA OF MULTIPLE SITES: ICD-10-CM

## 2019-08-13 DIAGNOSIS — Z23 NEED FOR PNEUMOCOCCAL VACCINATION: ICD-10-CM

## 2019-08-13 DIAGNOSIS — Z51.81 ENCOUNTER FOR THERAPEUTIC DRUG MONITORING: ICD-10-CM

## 2019-08-13 DIAGNOSIS — I48.20 CHRONIC ATRIAL FIBRILLATION (H): ICD-10-CM

## 2019-08-13 DIAGNOSIS — E78.2 MIXED HYPERLIPIDEMIA: ICD-10-CM

## 2019-08-13 LAB
ALBUMIN SERPL-MCNC: 4.1 G/DL (ref 3.5–5)
ALBUMIN UR-MCNC: ABNORMAL MG/DL
ALP SERPL-CCNC: 63 U/L (ref 45–120)
ALT SERPL W P-5'-P-CCNC: 12 U/L (ref 0–45)
ANION GAP SERPL CALCULATED.3IONS-SCNC: 7 MMOL/L (ref 5–18)
APPEARANCE UR: ABNORMAL
AST SERPL W P-5'-P-CCNC: 19 U/L (ref 0–40)
BACTERIA #/AREA URNS HPF: ABNORMAL HPF
BILIRUB SERPL-MCNC: 0.6 MG/DL (ref 0–1)
BILIRUB UR QL STRIP: NEGATIVE
BUN SERPL-MCNC: 21 MG/DL (ref 8–28)
CALCIUM SERPL-MCNC: 9.2 MG/DL (ref 8.5–10.5)
CHLORIDE BLD-SCNC: 107 MMOL/L (ref 98–107)
CHOLEST SERPL-MCNC: 190 MG/DL
CO2 SERPL-SCNC: 27 MMOL/L (ref 22–31)
COLOR UR AUTO: YELLOW
CREAT SERPL-MCNC: 0.85 MG/DL (ref 0.6–1.1)
FASTING STATUS PATIENT QL REPORTED: YES
GFR SERPL CREATININE-BSD FRML MDRD: >60 ML/MIN/1.73M2
GLUCOSE BLD-MCNC: 95 MG/DL (ref 70–125)
GLUCOSE UR STRIP-MCNC: NEGATIVE MG/DL
HDLC SERPL-MCNC: 54 MG/DL
HGB UR QL STRIP: ABNORMAL
INR PPP: 2.8 (ref 0.9–1.1)
KETONES UR STRIP-MCNC: NEGATIVE MG/DL
LDLC SERPL CALC-MCNC: 120 MG/DL
LEUKOCYTE ESTERASE UR QL STRIP: ABNORMAL
NITRATE UR QL: POSITIVE
PH UR STRIP: 6.5 [PH] (ref 5–8)
POTASSIUM BLD-SCNC: 3.7 MMOL/L (ref 3.5–5)
PROT SERPL-MCNC: 6.4 G/DL (ref 6–8)
RBC #/AREA URNS AUTO: ABNORMAL HPF
SODIUM SERPL-SCNC: 141 MMOL/L (ref 136–145)
SP GR UR STRIP: 1.02 (ref 1–1.03)
SQUAMOUS #/AREA URNS AUTO: ABNORMAL LPF
TRIGL SERPL-MCNC: 80 MG/DL
UROBILINOGEN UR STRIP-ACNC: ABNORMAL
WBC #/AREA URNS AUTO: ABNORMAL HPF

## 2019-08-13 RX ORDER — MULTIPLE VITAMINS W/ MINERALS TAB 9MG-400MCG
1 TAB ORAL DAILY
Status: SHIPPED | COMMUNITY
Start: 2019-08-13 | End: 2023-01-01

## 2019-08-13 ASSESSMENT — MIFFLIN-ST. JEOR: SCORE: 954.99

## 2019-08-15 LAB — BACTERIA SPEC CULT: ABNORMAL

## 2019-08-20 ENCOUNTER — AMBULATORY - HEALTHEAST (OUTPATIENT)
Dept: INTERNAL MEDICINE | Facility: CLINIC | Age: 84
End: 2019-08-20

## 2019-08-20 ENCOUNTER — COMMUNICATION - HEALTHEAST (OUTPATIENT)
Dept: INTERNAL MEDICINE | Facility: CLINIC | Age: 84
End: 2019-08-20

## 2019-08-20 DIAGNOSIS — M81.0 OSTEOPOROSIS: ICD-10-CM

## 2019-08-20 DIAGNOSIS — N30.00 ACUTE CYSTITIS WITHOUT HEMATURIA: ICD-10-CM

## 2019-08-21 ENCOUNTER — COMMUNICATION - HEALTHEAST (OUTPATIENT)
Dept: ANTICOAGULATION | Facility: CLINIC | Age: 84
End: 2019-08-21

## 2019-08-26 ENCOUNTER — COMMUNICATION - HEALTHEAST (OUTPATIENT)
Dept: INTERNAL MEDICINE | Facility: CLINIC | Age: 84
End: 2019-08-26

## 2019-08-26 ENCOUNTER — AMBULATORY - HEALTHEAST (OUTPATIENT)
Dept: PHARMACY | Facility: CLINIC | Age: 84
End: 2019-08-26

## 2019-08-27 ENCOUNTER — AMBULATORY - HEALTHEAST (OUTPATIENT)
Dept: NURSING | Facility: CLINIC | Age: 84
End: 2019-08-27

## 2019-08-27 ENCOUNTER — COMMUNICATION - HEALTHEAST (OUTPATIENT)
Dept: ANTICOAGULATION | Facility: CLINIC | Age: 84
End: 2019-08-27

## 2019-08-27 ENCOUNTER — AMBULATORY - HEALTHEAST (OUTPATIENT)
Dept: LAB | Facility: CLINIC | Age: 84
End: 2019-08-27

## 2019-08-27 DIAGNOSIS — I48.20 CHRONIC ATRIAL FIBRILLATION (H): ICD-10-CM

## 2019-08-27 LAB — INR PPP: 3.6 (ref 0.9–1.1)

## 2019-09-11 ENCOUNTER — COMMUNICATION - HEALTHEAST (OUTPATIENT)
Dept: ANTICOAGULATION | Facility: CLINIC | Age: 84
End: 2019-09-11

## 2019-09-11 ENCOUNTER — AMBULATORY - HEALTHEAST (OUTPATIENT)
Dept: LAB | Facility: CLINIC | Age: 84
End: 2019-09-11

## 2019-09-11 DIAGNOSIS — I48.20 CHRONIC ATRIAL FIBRILLATION (H): ICD-10-CM

## 2019-09-11 LAB — INR PPP: 2.7 (ref 0.9–1.1)

## 2019-09-18 ENCOUNTER — RECORDS - HEALTHEAST (OUTPATIENT)
Dept: ADMINISTRATIVE | Facility: OTHER | Age: 84
End: 2019-09-18

## 2019-09-25 ENCOUNTER — AMBULATORY - HEALTHEAST (OUTPATIENT)
Dept: LAB | Facility: CLINIC | Age: 84
End: 2019-09-25

## 2019-09-25 ENCOUNTER — COMMUNICATION - HEALTHEAST (OUTPATIENT)
Dept: ANTICOAGULATION | Facility: CLINIC | Age: 84
End: 2019-09-25

## 2019-09-25 DIAGNOSIS — I48.20 CHRONIC ATRIAL FIBRILLATION (H): ICD-10-CM

## 2019-09-25 LAB — INR PPP: 2.7 (ref 0.9–1.1)

## 2019-10-11 ENCOUNTER — RECORDS - HEALTHEAST (OUTPATIENT)
Dept: GENERAL RADIOLOGY | Facility: CLINIC | Age: 84
End: 2019-10-11

## 2019-10-11 ENCOUNTER — COMMUNICATION - HEALTHEAST (OUTPATIENT)
Dept: ANTICOAGULATION | Facility: CLINIC | Age: 84
End: 2019-10-11

## 2019-10-11 ENCOUNTER — OFFICE VISIT - HEALTHEAST (OUTPATIENT)
Dept: INTERNAL MEDICINE | Facility: CLINIC | Age: 84
End: 2019-10-11

## 2019-10-11 ENCOUNTER — COMMUNICATION - HEALTHEAST (OUTPATIENT)
Dept: INTERNAL MEDICINE | Facility: CLINIC | Age: 84
End: 2019-10-11

## 2019-10-11 DIAGNOSIS — Z23 FLU VACCINE NEED: ICD-10-CM

## 2019-10-11 DIAGNOSIS — M25.551 PAIN IN RIGHT HIP: ICD-10-CM

## 2019-10-11 DIAGNOSIS — I48.20 CHRONIC ATRIAL FIBRILLATION (H): ICD-10-CM

## 2019-10-11 DIAGNOSIS — M25.551 HIP PAIN, RIGHT: ICD-10-CM

## 2019-10-11 LAB — INR PPP: 2.1 (ref 0.9–1.1)

## 2019-10-11 ASSESSMENT — MIFFLIN-ST. JEOR: SCORE: 950.45

## 2019-10-23 ENCOUNTER — AMBULATORY - HEALTHEAST (OUTPATIENT)
Dept: LAB | Facility: CLINIC | Age: 84
End: 2019-10-23

## 2019-10-23 ENCOUNTER — COMMUNICATION - HEALTHEAST (OUTPATIENT)
Dept: ANTICOAGULATION | Facility: CLINIC | Age: 84
End: 2019-10-23

## 2019-10-23 DIAGNOSIS — I48.20 CHRONIC ATRIAL FIBRILLATION (H): ICD-10-CM

## 2019-10-23 LAB — INR PPP: 2.8 (ref 0.9–1.1)

## 2019-10-25 ENCOUNTER — COMMUNICATION - HEALTHEAST (OUTPATIENT)
Dept: INTERNAL MEDICINE | Facility: CLINIC | Age: 84
End: 2019-10-25

## 2019-10-25 DIAGNOSIS — I48.19 PERSISTENT ATRIAL FIBRILLATION (H): ICD-10-CM

## 2019-11-12 ENCOUNTER — OFFICE VISIT - HEALTHEAST (OUTPATIENT)
Dept: INTERNAL MEDICINE | Facility: CLINIC | Age: 84
End: 2019-11-12

## 2019-11-12 ENCOUNTER — COMMUNICATION - HEALTHEAST (OUTPATIENT)
Dept: ANTICOAGULATION | Facility: CLINIC | Age: 84
End: 2019-11-12

## 2019-11-12 DIAGNOSIS — I10 ESSENTIAL HYPERTENSION: ICD-10-CM

## 2019-11-12 DIAGNOSIS — I50.32 DIASTOLIC CHF, CHRONIC (H): ICD-10-CM

## 2019-11-12 DIAGNOSIS — I48.20 CHRONIC ATRIAL FIBRILLATION (H): ICD-10-CM

## 2019-11-12 DIAGNOSIS — M81.0 AGE-RELATED OSTEOPOROSIS WITHOUT CURRENT PATHOLOGICAL FRACTURE: ICD-10-CM

## 2019-11-12 DIAGNOSIS — Z95.810 ICD (IMPLANTABLE CARDIOVERTER-DEFIBRILLATOR), SINGLE, IN SITU: ICD-10-CM

## 2019-11-12 LAB — INR PPP: 2.9 (ref 0.9–1.1)

## 2019-11-12 RX ORDER — LOSARTAN POTASSIUM 25 MG/1
25 TABLET ORAL DAILY
Qty: 90 TABLET | Refills: 3 | Status: SHIPPED | OUTPATIENT
Start: 2019-11-12 | End: 2021-09-29

## 2019-11-12 ASSESSMENT — MIFFLIN-ST. JEOR: SCORE: 973.13

## 2019-11-14 ENCOUNTER — COMMUNICATION - HEALTHEAST (OUTPATIENT)
Dept: INTERNAL MEDICINE | Facility: CLINIC | Age: 84
End: 2019-11-14

## 2019-11-14 ENCOUNTER — RECORDS - HEALTHEAST (OUTPATIENT)
Dept: ADMINISTRATIVE | Facility: OTHER | Age: 84
End: 2019-11-14

## 2019-11-14 ENCOUNTER — RECORDS - HEALTHEAST (OUTPATIENT)
Dept: BONE DENSITY | Facility: CLINIC | Age: 84
End: 2019-11-14

## 2019-11-14 DIAGNOSIS — M85.89 OTHER SPECIFIED DISORDERS OF BONE DENSITY AND STRUCTURE, MULTIPLE SITES: ICD-10-CM

## 2019-11-25 ENCOUNTER — COMMUNICATION - HEALTHEAST (OUTPATIENT)
Dept: INTERNAL MEDICINE | Facility: CLINIC | Age: 84
End: 2019-11-25

## 2019-11-26 ENCOUNTER — AMBULATORY - HEALTHEAST (OUTPATIENT)
Dept: NURSING | Facility: CLINIC | Age: 84
End: 2019-11-26

## 2019-11-26 ENCOUNTER — AMBULATORY - HEALTHEAST (OUTPATIENT)
Dept: LAB | Facility: CLINIC | Age: 84
End: 2019-11-26

## 2019-11-26 ENCOUNTER — COMMUNICATION - HEALTHEAST (OUTPATIENT)
Dept: ANTICOAGULATION | Facility: CLINIC | Age: 84
End: 2019-11-26

## 2019-11-26 DIAGNOSIS — I48.20 CHRONIC ATRIAL FIBRILLATION (H): ICD-10-CM

## 2019-11-26 LAB — INR PPP: 1.8 (ref 0.9–1.1)

## 2019-12-10 ENCOUNTER — COMMUNICATION - HEALTHEAST (OUTPATIENT)
Dept: INTERNAL MEDICINE | Facility: CLINIC | Age: 84
End: 2019-12-10

## 2019-12-10 ENCOUNTER — AMBULATORY - HEALTHEAST (OUTPATIENT)
Dept: LAB | Facility: CLINIC | Age: 84
End: 2019-12-10

## 2019-12-10 DIAGNOSIS — I48.20 CHRONIC ATRIAL FIBRILLATION (H): ICD-10-CM

## 2019-12-10 LAB — INR PPP: 2.3 (ref 0.9–1.1)

## 2019-12-12 ENCOUNTER — COMMUNICATION - HEALTHEAST (OUTPATIENT)
Dept: ANTICOAGULATION | Facility: CLINIC | Age: 84
End: 2019-12-12

## 2019-12-12 DIAGNOSIS — I48.20 CHRONIC ATRIAL FIBRILLATION (H): ICD-10-CM

## 2019-12-23 ENCOUNTER — COMMUNICATION - HEALTHEAST (OUTPATIENT)
Dept: ANTICOAGULATION | Facility: CLINIC | Age: 84
End: 2019-12-23

## 2019-12-23 ENCOUNTER — AMBULATORY - HEALTHEAST (OUTPATIENT)
Dept: LAB | Facility: CLINIC | Age: 84
End: 2019-12-23

## 2019-12-23 DIAGNOSIS — I48.20 CHRONIC ATRIAL FIBRILLATION (H): ICD-10-CM

## 2019-12-23 LAB — INR PPP: 2.4 (ref 0.9–1.1)

## 2020-01-06 ENCOUNTER — COMMUNICATION - HEALTHEAST (OUTPATIENT)
Dept: ANTICOAGULATION | Facility: CLINIC | Age: 85
End: 2020-01-06

## 2020-01-06 ENCOUNTER — AMBULATORY - HEALTHEAST (OUTPATIENT)
Dept: LAB | Facility: CLINIC | Age: 85
End: 2020-01-06

## 2020-01-06 DIAGNOSIS — I48.20 CHRONIC ATRIAL FIBRILLATION (H): ICD-10-CM

## 2020-01-06 LAB — INR PPP: 2.8 (ref 0.9–1.1)

## 2020-01-27 ENCOUNTER — AMBULATORY - HEALTHEAST (OUTPATIENT)
Dept: LAB | Facility: CLINIC | Age: 85
End: 2020-01-27

## 2020-01-27 ENCOUNTER — COMMUNICATION - HEALTHEAST (OUTPATIENT)
Dept: ANTICOAGULATION | Facility: CLINIC | Age: 85
End: 2020-01-27

## 2020-01-27 DIAGNOSIS — I48.20 CHRONIC ATRIAL FIBRILLATION (H): ICD-10-CM

## 2020-01-27 LAB
INR PPP: 2.7 (ref 0.9–1.1)
INR PPP: 2.9 (ref 0.9–1.1)

## 2020-02-11 ENCOUNTER — OFFICE VISIT - HEALTHEAST (OUTPATIENT)
Dept: INTERNAL MEDICINE | Facility: CLINIC | Age: 85
End: 2020-02-11

## 2020-02-11 ENCOUNTER — COMMUNICATION - HEALTHEAST (OUTPATIENT)
Dept: ANTICOAGULATION | Facility: CLINIC | Age: 85
End: 2020-02-11

## 2020-02-11 DIAGNOSIS — I10 ESSENTIAL HYPERTENSION: ICD-10-CM

## 2020-02-11 DIAGNOSIS — I48.20 CHRONIC ATRIAL FIBRILLATION (H): ICD-10-CM

## 2020-02-11 DIAGNOSIS — E78.2 MIXED HYPERLIPIDEMIA: ICD-10-CM

## 2020-02-11 DIAGNOSIS — Z00.00 MEDICARE ANNUAL WELLNESS VISIT, SUBSEQUENT: ICD-10-CM

## 2020-02-11 DIAGNOSIS — K21.9 GASTROESOPHAGEAL REFLUX DISEASE, ESOPHAGITIS PRESENCE NOT SPECIFIED: ICD-10-CM

## 2020-02-11 DIAGNOSIS — Z51.81 ENCOUNTER FOR THERAPEUTIC DRUG MONITORING: ICD-10-CM

## 2020-02-11 DIAGNOSIS — M81.0 AGE-RELATED OSTEOPOROSIS WITHOUT CURRENT PATHOLOGICAL FRACTURE: ICD-10-CM

## 2020-02-11 DIAGNOSIS — Z95.810 ICD (IMPLANTABLE CARDIOVERTER-DEFIBRILLATOR), SINGLE, IN SITU: ICD-10-CM

## 2020-02-11 LAB
ALBUMIN UR-MCNC: NEGATIVE MG/DL
APPEARANCE UR: CLEAR
BACTERIA #/AREA URNS HPF: ABNORMAL HPF
BILIRUB UR QL STRIP: NEGATIVE
COLOR UR AUTO: YELLOW
ERYTHROCYTE [DISTWIDTH] IN BLOOD BY AUTOMATED COUNT: 11.8 % (ref 11–14.5)
GLUCOSE UR STRIP-MCNC: NEGATIVE MG/DL
HCT VFR BLD AUTO: 44 % (ref 35–47)
HGB BLD-MCNC: 14.3 G/DL (ref 12–16)
HGB UR QL STRIP: ABNORMAL
INR PPP: 2.9 (ref 0.9–1.1)
KETONES UR STRIP-MCNC: NEGATIVE MG/DL
LEUKOCYTE ESTERASE UR QL STRIP: NEGATIVE
MCH RBC QN AUTO: 29.8 PG (ref 27–34)
MCHC RBC AUTO-ENTMCNC: 32.6 G/DL (ref 32–36)
MCV RBC AUTO: 91 FL (ref 80–100)
NITRATE UR QL: NEGATIVE
PH UR STRIP: 5.5 [PH] (ref 5–8)
PLATELET # BLD AUTO: 248 THOU/UL (ref 140–440)
PMV BLD AUTO: 7.8 FL (ref 7–10)
RBC # BLD AUTO: 4.81 MILL/UL (ref 3.8–5.4)
RBC #/AREA URNS AUTO: ABNORMAL HPF
SP GR UR STRIP: 1.02 (ref 1–1.03)
SQUAMOUS #/AREA URNS AUTO: ABNORMAL LPF
UROBILINOGEN UR STRIP-ACNC: ABNORMAL
WBC #/AREA URNS AUTO: ABNORMAL HPF
WBC: 8.2 THOU/UL (ref 4–11)

## 2020-02-11 ASSESSMENT — MIFFLIN-ST. JEOR: SCORE: 950.25

## 2020-02-12 ENCOUNTER — AMBULATORY - HEALTHEAST (OUTPATIENT)
Dept: PHARMACY | Facility: CLINIC | Age: 85
End: 2020-02-12

## 2020-02-12 DIAGNOSIS — M81.0 SENILE OSTEOPOROSIS: ICD-10-CM

## 2020-02-12 LAB
ALBUMIN SERPL-MCNC: 4.4 G/DL (ref 3.5–5)
ALP SERPL-CCNC: 62 U/L (ref 45–120)
ALT SERPL W P-5'-P-CCNC: 12 U/L (ref 0–45)
ANION GAP SERPL CALCULATED.3IONS-SCNC: 10 MMOL/L (ref 5–18)
AST SERPL W P-5'-P-CCNC: 20 U/L (ref 0–40)
BILIRUB SERPL-MCNC: 0.5 MG/DL (ref 0–1)
BUN SERPL-MCNC: 19 MG/DL (ref 8–28)
CALCIUM SERPL-MCNC: 9.4 MG/DL (ref 8.5–10.5)
CHLORIDE BLD-SCNC: 102 MMOL/L (ref 98–107)
CHOLEST SERPL-MCNC: 226 MG/DL
CO2 SERPL-SCNC: 27 MMOL/L (ref 22–31)
CREAT SERPL-MCNC: 0.95 MG/DL (ref 0.6–1.1)
FASTING STATUS PATIENT QL REPORTED: ABNORMAL
GFR SERPL CREATININE-BSD FRML MDRD: 55 ML/MIN/1.73M2
GLUCOSE BLD-MCNC: 82 MG/DL (ref 70–125)
HDLC SERPL-MCNC: 56 MG/DL
LDLC SERPL CALC-MCNC: 150 MG/DL
POTASSIUM BLD-SCNC: 4.2 MMOL/L (ref 3.5–5)
PROT SERPL-MCNC: 7 G/DL (ref 6–8)
SODIUM SERPL-SCNC: 139 MMOL/L (ref 136–145)
TRIGL SERPL-MCNC: 100 MG/DL

## 2020-02-13 LAB — 25(OH)D3 SERPL-MCNC: 55.6 NG/ML (ref 30–80)

## 2020-02-14 ENCOUNTER — COMMUNICATION - HEALTHEAST (OUTPATIENT)
Dept: INTERNAL MEDICINE | Facility: CLINIC | Age: 85
End: 2020-02-14

## 2020-02-17 ENCOUNTER — COMMUNICATION - HEALTHEAST (OUTPATIENT)
Dept: INTERNAL MEDICINE | Facility: CLINIC | Age: 85
End: 2020-02-17

## 2020-02-17 DIAGNOSIS — E87.6 DIURETIC-INDUCED HYPOKALEMIA: ICD-10-CM

## 2020-02-17 DIAGNOSIS — T50.2X5A DIURETIC-INDUCED HYPOKALEMIA: ICD-10-CM

## 2020-02-24 ENCOUNTER — COMMUNICATION - HEALTHEAST (OUTPATIENT)
Dept: ANTICOAGULATION | Facility: CLINIC | Age: 85
End: 2020-02-24

## 2020-02-24 ENCOUNTER — AMBULATORY - HEALTHEAST (OUTPATIENT)
Dept: LAB | Facility: CLINIC | Age: 85
End: 2020-02-24

## 2020-02-24 DIAGNOSIS — I48.20 CHRONIC ATRIAL FIBRILLATION (H): ICD-10-CM

## 2020-02-24 LAB — INR PPP: 2.9 (ref 0.9–1.1)

## 2020-03-05 ENCOUNTER — COMMUNICATION - HEALTHEAST (OUTPATIENT)
Dept: ADMINISTRATIVE | Facility: HOSPITAL | Age: 85
End: 2020-03-05

## 2020-03-16 ENCOUNTER — COMMUNICATION - HEALTHEAST (OUTPATIENT)
Dept: INTERNAL MEDICINE | Facility: CLINIC | Age: 85
End: 2020-03-16

## 2020-03-16 DIAGNOSIS — I48.20 CHRONIC ATRIAL FIBRILLATION (H): ICD-10-CM

## 2020-03-20 ENCOUNTER — COMMUNICATION - HEALTHEAST (OUTPATIENT)
Dept: INTERNAL MEDICINE | Facility: CLINIC | Age: 85
End: 2020-03-20

## 2020-03-20 DIAGNOSIS — I48.20 CHRONIC ATRIAL FIBRILLATION (H): ICD-10-CM

## 2020-03-26 ENCOUNTER — COMMUNICATION - HEALTHEAST (OUTPATIENT)
Dept: INTERNAL MEDICINE | Facility: CLINIC | Age: 85
End: 2020-03-26

## 2020-03-31 ENCOUNTER — COMMUNICATION - HEALTHEAST (OUTPATIENT)
Dept: INTERNAL MEDICINE | Facility: CLINIC | Age: 85
End: 2020-03-31

## 2020-04-03 ENCOUNTER — COMMUNICATION - HEALTHEAST (OUTPATIENT)
Dept: INTERNAL MEDICINE | Facility: CLINIC | Age: 85
End: 2020-04-03

## 2020-04-06 ENCOUNTER — AMBULATORY - HEALTHEAST (OUTPATIENT)
Dept: LAB | Facility: CLINIC | Age: 85
End: 2020-04-06

## 2020-04-06 ENCOUNTER — COMMUNICATION - HEALTHEAST (OUTPATIENT)
Dept: ANTICOAGULATION | Facility: CLINIC | Age: 85
End: 2020-04-06

## 2020-04-06 DIAGNOSIS — I48.20 CHRONIC ATRIAL FIBRILLATION (H): ICD-10-CM

## 2020-04-06 LAB — INR PPP: 3 (ref 0.9–1.1)

## 2020-04-07 ENCOUNTER — COMMUNICATION - HEALTHEAST (OUTPATIENT)
Dept: SCHEDULING | Facility: CLINIC | Age: 85
End: 2020-04-07

## 2020-04-13 ENCOUNTER — RECORDS - HEALTHEAST (OUTPATIENT)
Dept: ADMINISTRATIVE | Facility: OTHER | Age: 85
End: 2020-04-13

## 2020-04-18 ENCOUNTER — COMMUNICATION - HEALTHEAST (OUTPATIENT)
Dept: INTERNAL MEDICINE | Facility: CLINIC | Age: 85
End: 2020-04-18

## 2020-04-18 DIAGNOSIS — K21.9 GASTROESOPHAGEAL REFLUX DISEASE, ESOPHAGITIS PRESENCE NOT SPECIFIED: ICD-10-CM

## 2020-05-12 ENCOUNTER — OFFICE VISIT - HEALTHEAST (OUTPATIENT)
Dept: INTERNAL MEDICINE | Facility: CLINIC | Age: 85
End: 2020-05-12

## 2020-05-12 ENCOUNTER — COMMUNICATION - HEALTHEAST (OUTPATIENT)
Dept: INTERNAL MEDICINE | Facility: CLINIC | Age: 85
End: 2020-05-12

## 2020-05-12 DIAGNOSIS — I10 ESSENTIAL HYPERTENSION: ICD-10-CM

## 2020-05-12 DIAGNOSIS — I48.20 CHRONIC ATRIAL FIBRILLATION (H): ICD-10-CM

## 2020-05-12 DIAGNOSIS — M70.61 TROCHANTERIC BURSITIS OF RIGHT HIP: ICD-10-CM

## 2020-05-12 DIAGNOSIS — M81.0 AGE-RELATED OSTEOPOROSIS WITHOUT CURRENT PATHOLOGICAL FRACTURE: ICD-10-CM

## 2020-05-18 ENCOUNTER — OFFICE VISIT - HEALTHEAST (OUTPATIENT)
Dept: INTERNAL MEDICINE | Facility: CLINIC | Age: 85
End: 2020-05-18

## 2020-05-18 ENCOUNTER — COMMUNICATION - HEALTHEAST (OUTPATIENT)
Dept: ANTICOAGULATION | Facility: CLINIC | Age: 85
End: 2020-05-18

## 2020-05-18 ENCOUNTER — COMMUNICATION - HEALTHEAST (OUTPATIENT)
Dept: INTERNAL MEDICINE | Facility: CLINIC | Age: 85
End: 2020-05-18

## 2020-05-18 DIAGNOSIS — M70.61 TROCHANTERIC BURSITIS OF RIGHT HIP: ICD-10-CM

## 2020-05-18 DIAGNOSIS — I50.33 ACUTE ON CHRONIC DIASTOLIC CONGESTIVE HEART FAILURE (H): ICD-10-CM

## 2020-05-18 DIAGNOSIS — I48.20 CHRONIC ATRIAL FIBRILLATION (H): ICD-10-CM

## 2020-05-18 DIAGNOSIS — T50.2X5A DIURETIC-INDUCED HYPOKALEMIA: ICD-10-CM

## 2020-05-18 DIAGNOSIS — E87.6 DIURETIC-INDUCED HYPOKALEMIA: ICD-10-CM

## 2020-05-18 LAB
ANION GAP SERPL CALCULATED.3IONS-SCNC: 11 MMOL/L (ref 5–18)
BNP SERPL-MCNC: 251 PG/ML (ref 0–167)
BUN SERPL-MCNC: 17 MG/DL (ref 8–28)
CALCIUM SERPL-MCNC: 9.8 MG/DL (ref 8.5–10.5)
CHLORIDE BLD-SCNC: 102 MMOL/L (ref 98–107)
CO2 SERPL-SCNC: 28 MMOL/L (ref 22–31)
CREAT SERPL-MCNC: 0.95 MG/DL (ref 0.6–1.1)
ERYTHROCYTE [DISTWIDTH] IN BLOOD BY AUTOMATED COUNT: 13 % (ref 11–14.5)
GFR SERPL CREATININE-BSD FRML MDRD: 55 ML/MIN/1.73M2
GLUCOSE BLD-MCNC: 83 MG/DL (ref 70–125)
HCT VFR BLD AUTO: 39.8 % (ref 35–47)
HGB BLD-MCNC: 13.4 G/DL (ref 12–16)
INR PPP: 3.9 (ref 0.9–1.1)
MCH RBC QN AUTO: 30.3 PG (ref 27–34)
MCHC RBC AUTO-ENTMCNC: 33.7 G/DL (ref 32–36)
MCV RBC AUTO: 90 FL (ref 80–100)
PLATELET # BLD AUTO: 264 THOU/UL (ref 140–440)
PMV BLD AUTO: 7.7 FL (ref 7–10)
POTASSIUM BLD-SCNC: 4.3 MMOL/L (ref 3.5–5)
RBC # BLD AUTO: 4.43 MILL/UL (ref 3.8–5.4)
SODIUM SERPL-SCNC: 141 MMOL/L (ref 136–145)
WBC: 8.5 THOU/UL (ref 4–11)

## 2020-05-18 ASSESSMENT — MIFFLIN-ST. JEOR: SCORE: 963.86

## 2020-05-19 ENCOUNTER — COMMUNICATION - HEALTHEAST (OUTPATIENT)
Dept: INTERNAL MEDICINE | Facility: CLINIC | Age: 85
End: 2020-05-19

## 2020-06-01 ENCOUNTER — AMBULATORY - HEALTHEAST (OUTPATIENT)
Dept: LAB | Facility: CLINIC | Age: 85
End: 2020-06-01

## 2020-06-01 ENCOUNTER — COMMUNICATION - HEALTHEAST (OUTPATIENT)
Dept: ANTICOAGULATION | Facility: CLINIC | Age: 85
End: 2020-06-01

## 2020-06-01 DIAGNOSIS — I48.20 CHRONIC ATRIAL FIBRILLATION (H): ICD-10-CM

## 2020-06-01 LAB — INR PPP: 4.3 (ref 0.9–1.1)

## 2020-06-10 ENCOUNTER — COMMUNICATION - HEALTHEAST (OUTPATIENT)
Dept: INTERNAL MEDICINE | Facility: CLINIC | Age: 85
End: 2020-06-10

## 2020-06-10 DIAGNOSIS — I48.20 CHRONIC ATRIAL FIBRILLATION (H): ICD-10-CM

## 2020-06-11 ENCOUNTER — COMMUNICATION - HEALTHEAST (OUTPATIENT)
Dept: ANTICOAGULATION | Facility: CLINIC | Age: 85
End: 2020-06-11

## 2020-06-11 ENCOUNTER — COMMUNICATION - HEALTHEAST (OUTPATIENT)
Dept: INTERNAL MEDICINE | Facility: CLINIC | Age: 85
End: 2020-06-11

## 2020-06-11 ENCOUNTER — AMBULATORY - HEALTHEAST (OUTPATIENT)
Dept: LAB | Facility: CLINIC | Age: 85
End: 2020-06-11

## 2020-06-11 DIAGNOSIS — I48.20 CHRONIC ATRIAL FIBRILLATION (H): ICD-10-CM

## 2020-06-11 DIAGNOSIS — I48.19 PERSISTENT ATRIAL FIBRILLATION (H): ICD-10-CM

## 2020-06-11 LAB — INR PPP: 2.9 (ref 0.9–1.1)

## 2020-06-22 ENCOUNTER — OFFICE VISIT - HEALTHEAST (OUTPATIENT)
Dept: INTERNAL MEDICINE | Facility: CLINIC | Age: 85
End: 2020-06-22

## 2020-06-22 DIAGNOSIS — Z51.81 ENCOUNTER FOR THERAPEUTIC DRUG MONITORING: ICD-10-CM

## 2020-06-22 DIAGNOSIS — G89.29 CHRONIC MIDLINE LOW BACK PAIN WITHOUT SCIATICA: ICD-10-CM

## 2020-06-22 DIAGNOSIS — I50.32 DIASTOLIC CHF, CHRONIC (H): ICD-10-CM

## 2020-06-22 DIAGNOSIS — M70.61 TROCHANTERIC BURSITIS OF RIGHT HIP: ICD-10-CM

## 2020-06-22 DIAGNOSIS — I48.20 CHRONIC ATRIAL FIBRILLATION (H): ICD-10-CM

## 2020-06-22 DIAGNOSIS — M54.50 CHRONIC MIDLINE LOW BACK PAIN WITHOUT SCIATICA: ICD-10-CM

## 2020-06-25 ENCOUNTER — COMMUNICATION - HEALTHEAST (OUTPATIENT)
Dept: ANTICOAGULATION | Facility: CLINIC | Age: 85
End: 2020-06-25

## 2020-06-25 ENCOUNTER — AMBULATORY - HEALTHEAST (OUTPATIENT)
Dept: LAB | Facility: CLINIC | Age: 85
End: 2020-06-25

## 2020-06-25 DIAGNOSIS — Z51.81 ENCOUNTER FOR THERAPEUTIC DRUG MONITORING: ICD-10-CM

## 2020-06-25 DIAGNOSIS — I48.20 CHRONIC ATRIAL FIBRILLATION (H): ICD-10-CM

## 2020-06-25 LAB
ANION GAP SERPL CALCULATED.3IONS-SCNC: 12 MMOL/L (ref 5–18)
BUN SERPL-MCNC: 13 MG/DL (ref 8–28)
CALCIUM SERPL-MCNC: 9.2 MG/DL (ref 8.5–10.5)
CHLORIDE BLD-SCNC: 104 MMOL/L (ref 98–107)
CO2 SERPL-SCNC: 23 MMOL/L (ref 22–31)
CREAT SERPL-MCNC: 0.87 MG/DL (ref 0.6–1.1)
GFR SERPL CREATININE-BSD FRML MDRD: >60 ML/MIN/1.73M2
GLUCOSE BLD-MCNC: 78 MG/DL (ref 70–125)
INR PPP: 3.2 (ref 0.9–1.1)
POTASSIUM BLD-SCNC: 4.2 MMOL/L (ref 3.5–5)
SODIUM SERPL-SCNC: 139 MMOL/L (ref 136–145)

## 2020-06-28 ENCOUNTER — COMMUNICATION - HEALTHEAST (OUTPATIENT)
Dept: INTERNAL MEDICINE | Facility: CLINIC | Age: 85
End: 2020-06-28

## 2020-07-09 ENCOUNTER — COMMUNICATION - HEALTHEAST (OUTPATIENT)
Dept: ANTICOAGULATION | Facility: CLINIC | Age: 85
End: 2020-07-09

## 2020-07-09 ENCOUNTER — AMBULATORY - HEALTHEAST (OUTPATIENT)
Dept: LAB | Facility: CLINIC | Age: 85
End: 2020-07-09

## 2020-07-09 DIAGNOSIS — I48.20 CHRONIC ATRIAL FIBRILLATION (H): ICD-10-CM

## 2020-07-09 LAB — INR PPP: 1.8 (ref 0.9–1.1)

## 2020-07-23 ENCOUNTER — AMBULATORY - HEALTHEAST (OUTPATIENT)
Dept: LAB | Facility: CLINIC | Age: 85
End: 2020-07-23

## 2020-07-23 ENCOUNTER — COMMUNICATION - HEALTHEAST (OUTPATIENT)
Dept: ANTICOAGULATION | Facility: CLINIC | Age: 85
End: 2020-07-23

## 2020-07-23 DIAGNOSIS — I48.20 CHRONIC ATRIAL FIBRILLATION (H): ICD-10-CM

## 2020-07-23 LAB — INR PPP: 2 (ref 0.9–1.1)

## 2020-07-24 ENCOUNTER — COMMUNICATION - HEALTHEAST (OUTPATIENT)
Dept: INTERNAL MEDICINE | Facility: CLINIC | Age: 85
End: 2020-07-24

## 2020-07-24 DIAGNOSIS — I48.20 CHRONIC ATRIAL FIBRILLATION (H): ICD-10-CM

## 2020-08-04 ENCOUNTER — OFFICE VISIT - HEALTHEAST (OUTPATIENT)
Dept: INTERNAL MEDICINE | Facility: CLINIC | Age: 85
End: 2020-08-04

## 2020-08-04 DIAGNOSIS — G89.29 CHRONIC MIDLINE LOW BACK PAIN WITHOUT SCIATICA: ICD-10-CM

## 2020-08-04 DIAGNOSIS — I50.32 DIASTOLIC CHF, CHRONIC (H): ICD-10-CM

## 2020-08-04 DIAGNOSIS — I10 ESSENTIAL HYPERTENSION: ICD-10-CM

## 2020-08-04 DIAGNOSIS — M54.50 CHRONIC MIDLINE LOW BACK PAIN WITHOUT SCIATICA: ICD-10-CM

## 2020-08-04 DIAGNOSIS — I48.20 CHRONIC ATRIAL FIBRILLATION (H): ICD-10-CM

## 2020-08-07 ENCOUNTER — AMBULATORY - HEALTHEAST (OUTPATIENT)
Dept: LAB | Facility: CLINIC | Age: 85
End: 2020-08-07

## 2020-08-07 ENCOUNTER — COMMUNICATION - HEALTHEAST (OUTPATIENT)
Dept: ANTICOAGULATION | Facility: CLINIC | Age: 85
End: 2020-08-07

## 2020-08-07 DIAGNOSIS — I48.20 CHRONIC ATRIAL FIBRILLATION (H): ICD-10-CM

## 2020-08-07 LAB — INR PPP: 1.9 (ref 0.9–1.1)

## 2020-08-21 ENCOUNTER — COMMUNICATION - HEALTHEAST (OUTPATIENT)
Dept: INTERNAL MEDICINE | Facility: CLINIC | Age: 85
End: 2020-08-21

## 2020-08-21 DIAGNOSIS — I50.32 CHRONIC DIASTOLIC HEART FAILURE (H): ICD-10-CM

## 2020-08-24 ENCOUNTER — COMMUNICATION - HEALTHEAST (OUTPATIENT)
Dept: ANTICOAGULATION | Facility: CLINIC | Age: 85
End: 2020-08-24

## 2020-08-24 ENCOUNTER — AMBULATORY - HEALTHEAST (OUTPATIENT)
Dept: LAB | Facility: CLINIC | Age: 85
End: 2020-08-24

## 2020-08-24 DIAGNOSIS — I48.20 CHRONIC ATRIAL FIBRILLATION (H): ICD-10-CM

## 2020-08-24 LAB — INR PPP: 2.1 (ref 0.9–1.1)

## 2020-09-09 ENCOUNTER — COMMUNICATION - HEALTHEAST (OUTPATIENT)
Dept: INTERNAL MEDICINE | Facility: CLINIC | Age: 85
End: 2020-09-09

## 2020-09-09 DIAGNOSIS — I48.20 CHRONIC ATRIAL FIBRILLATION (H): ICD-10-CM

## 2020-09-11 RX ORDER — METOPROLOL TARTRATE 25 MG/1
TABLET, FILM COATED ORAL
Qty: 180 TABLET | Refills: 3 | Status: SHIPPED | OUTPATIENT
Start: 2020-09-11 | End: 2021-09-29

## 2020-09-14 ENCOUNTER — COMMUNICATION - HEALTHEAST (OUTPATIENT)
Dept: ANTICOAGULATION | Facility: CLINIC | Age: 85
End: 2020-09-14

## 2020-09-14 ENCOUNTER — OFFICE VISIT - HEALTHEAST (OUTPATIENT)
Dept: INTERNAL MEDICINE | Facility: CLINIC | Age: 85
End: 2020-09-14

## 2020-09-14 DIAGNOSIS — I48.19 PERSISTENT ATRIAL FIBRILLATION (H): ICD-10-CM

## 2020-09-14 DIAGNOSIS — I50.32 DIASTOLIC CHF, CHRONIC (H): ICD-10-CM

## 2020-09-14 DIAGNOSIS — T50.2X5A DIURETIC-INDUCED HYPOKALEMIA: ICD-10-CM

## 2020-09-14 DIAGNOSIS — I48.20 CHRONIC ATRIAL FIBRILLATION (H): ICD-10-CM

## 2020-09-14 DIAGNOSIS — E87.6 DIURETIC-INDUCED HYPOKALEMIA: ICD-10-CM

## 2020-09-14 DIAGNOSIS — Z95.810 ICD (IMPLANTABLE CARDIOVERTER-DEFIBRILLATOR), SINGLE, IN SITU: ICD-10-CM

## 2020-09-14 LAB
ANION GAP SERPL CALCULATED.3IONS-SCNC: 11 MMOL/L (ref 5–18)
BUN SERPL-MCNC: 17 MG/DL (ref 8–28)
CALCIUM SERPL-MCNC: 9.6 MG/DL (ref 8.5–10.5)
CHLORIDE BLD-SCNC: 104 MMOL/L (ref 98–107)
CO2 SERPL-SCNC: 25 MMOL/L (ref 22–31)
CREAT SERPL-MCNC: 0.94 MG/DL (ref 0.6–1.1)
GFR SERPL CREATININE-BSD FRML MDRD: 56 ML/MIN/1.73M2
GLUCOSE BLD-MCNC: 93 MG/DL (ref 70–125)
INR PPP: 2.1 (ref 0.9–1.1)
POTASSIUM BLD-SCNC: 4 MMOL/L (ref 3.5–5)
SODIUM SERPL-SCNC: 140 MMOL/L (ref 136–145)

## 2020-09-14 ASSESSMENT — MIFFLIN-ST. JEOR: SCORE: 982.21

## 2020-09-15 ENCOUNTER — COMMUNICATION - HEALTHEAST (OUTPATIENT)
Dept: INTERNAL MEDICINE | Facility: CLINIC | Age: 85
End: 2020-09-15

## 2020-09-15 DIAGNOSIS — I48.19 PERSISTENT ATRIAL FIBRILLATION (H): ICD-10-CM

## 2020-09-21 ENCOUNTER — COMMUNICATION - HEALTHEAST (OUTPATIENT)
Dept: INTERNAL MEDICINE | Facility: CLINIC | Age: 85
End: 2020-09-21

## 2020-09-21 DIAGNOSIS — I50.32 CHRONIC DIASTOLIC CONGESTIVE HEART FAILURE (H): ICD-10-CM

## 2020-09-23 ENCOUNTER — COMMUNICATION - HEALTHEAST (OUTPATIENT)
Dept: SCHEDULING | Facility: CLINIC | Age: 85
End: 2020-09-23

## 2020-09-23 DIAGNOSIS — I50.32 CHRONIC DIASTOLIC CONGESTIVE HEART FAILURE (H): ICD-10-CM

## 2020-09-29 ENCOUNTER — COMMUNICATION - HEALTHEAST (OUTPATIENT)
Dept: ANTICOAGULATION | Facility: CLINIC | Age: 85
End: 2020-09-29

## 2020-09-29 ENCOUNTER — AMBULATORY - HEALTHEAST (OUTPATIENT)
Dept: LAB | Facility: CLINIC | Age: 85
End: 2020-09-29

## 2020-09-29 DIAGNOSIS — I48.20 CHRONIC ATRIAL FIBRILLATION (H): ICD-10-CM

## 2020-09-29 LAB — INR PPP: 3.3 (ref 0.9–1.1)

## 2020-10-13 ENCOUNTER — COMMUNICATION - HEALTHEAST (OUTPATIENT)
Dept: ANTICOAGULATION | Facility: CLINIC | Age: 85
End: 2020-10-13

## 2020-10-13 ENCOUNTER — AMBULATORY - HEALTHEAST (OUTPATIENT)
Dept: NURSING | Facility: CLINIC | Age: 85
End: 2020-10-13

## 2020-10-13 ENCOUNTER — AMBULATORY - HEALTHEAST (OUTPATIENT)
Dept: LAB | Facility: CLINIC | Age: 85
End: 2020-10-13

## 2020-10-13 DIAGNOSIS — I48.20 CHRONIC ATRIAL FIBRILLATION (H): ICD-10-CM

## 2020-10-13 LAB — INR PPP: 3.4 (ref 0.9–1.1)

## 2020-10-27 ENCOUNTER — COMMUNICATION - HEALTHEAST (OUTPATIENT)
Dept: ANTICOAGULATION | Facility: CLINIC | Age: 85
End: 2020-10-27

## 2020-10-27 ENCOUNTER — AMBULATORY - HEALTHEAST (OUTPATIENT)
Dept: LAB | Facility: CLINIC | Age: 85
End: 2020-10-27

## 2020-10-27 DIAGNOSIS — I48.20 CHRONIC ATRIAL FIBRILLATION (H): ICD-10-CM

## 2020-10-27 LAB — INR PPP: 2.6 (ref 0.9–1.1)

## 2020-11-16 ENCOUNTER — COMMUNICATION - HEALTHEAST (OUTPATIENT)
Dept: ANTICOAGULATION | Facility: CLINIC | Age: 85
End: 2020-11-16

## 2020-11-17 ENCOUNTER — COMMUNICATION - HEALTHEAST (OUTPATIENT)
Dept: ANTICOAGULATION | Facility: CLINIC | Age: 85
End: 2020-11-17

## 2020-11-17 ENCOUNTER — AMBULATORY - HEALTHEAST (OUTPATIENT)
Dept: LAB | Facility: CLINIC | Age: 85
End: 2020-11-17

## 2020-11-17 DIAGNOSIS — I48.20 CHRONIC ATRIAL FIBRILLATION (H): ICD-10-CM

## 2020-11-17 LAB — INR PPP: 2.7 (ref 0.9–1.1)

## 2020-12-08 ENCOUNTER — OFFICE VISIT - HEALTHEAST (OUTPATIENT)
Dept: INTERNAL MEDICINE | Facility: CLINIC | Age: 85
End: 2020-12-08

## 2020-12-08 ENCOUNTER — COMMUNICATION - HEALTHEAST (OUTPATIENT)
Dept: ANTICOAGULATION | Facility: CLINIC | Age: 85
End: 2020-12-08

## 2020-12-08 DIAGNOSIS — I48.20 CHRONIC ATRIAL FIBRILLATION (H): ICD-10-CM

## 2020-12-08 DIAGNOSIS — M81.0 AGE-RELATED OSTEOPOROSIS WITHOUT CURRENT PATHOLOGICAL FRACTURE: ICD-10-CM

## 2020-12-08 DIAGNOSIS — I50.32 CHRONIC DIASTOLIC CONGESTIVE HEART FAILURE (H): ICD-10-CM

## 2020-12-08 DIAGNOSIS — D48.5 NEOPLASM OF UNCERTAIN BEHAVIOR OF SKIN OF FACE: ICD-10-CM

## 2020-12-08 DIAGNOSIS — I10 ESSENTIAL HYPERTENSION: ICD-10-CM

## 2020-12-08 LAB
ANION GAP SERPL CALCULATED.3IONS-SCNC: 11 MMOL/L (ref 5–18)
BUN SERPL-MCNC: 19 MG/DL (ref 8–28)
CALCIUM SERPL-MCNC: 9 MG/DL (ref 8.5–10.5)
CHLORIDE BLD-SCNC: 104 MMOL/L (ref 98–107)
CO2 SERPL-SCNC: 24 MMOL/L (ref 22–31)
CREAT SERPL-MCNC: 0.93 MG/DL (ref 0.6–1.1)
GFR SERPL CREATININE-BSD FRML MDRD: 57 ML/MIN/1.73M2
GLUCOSE BLD-MCNC: 99 MG/DL (ref 70–125)
INR PPP: 2.1 (ref 0.9–1.1)
POTASSIUM BLD-SCNC: 4 MMOL/L (ref 3.5–5)
SODIUM SERPL-SCNC: 139 MMOL/L (ref 136–145)

## 2020-12-08 ASSESSMENT — MIFFLIN-ST. JEOR: SCORE: 966.33

## 2020-12-10 ENCOUNTER — COMMUNICATION - HEALTHEAST (OUTPATIENT)
Dept: INTERNAL MEDICINE | Facility: CLINIC | Age: 85
End: 2020-12-10

## 2020-12-15 ENCOUNTER — COMMUNICATION - HEALTHEAST (OUTPATIENT)
Dept: LAB | Facility: CLINIC | Age: 85
End: 2020-12-15

## 2020-12-15 ENCOUNTER — COMMUNICATION - HEALTHEAST (OUTPATIENT)
Dept: INTERNAL MEDICINE | Facility: CLINIC | Age: 85
End: 2020-12-15

## 2020-12-15 DIAGNOSIS — I48.91 ATRIAL FIBRILLATION, UNSPECIFIED TYPE (H): ICD-10-CM

## 2020-12-22 ENCOUNTER — COMMUNICATION - HEALTHEAST (OUTPATIENT)
Dept: ANTICOAGULATION | Facility: CLINIC | Age: 85
End: 2020-12-22

## 2020-12-22 ENCOUNTER — AMBULATORY - HEALTHEAST (OUTPATIENT)
Dept: LAB | Facility: CLINIC | Age: 85
End: 2020-12-22

## 2020-12-22 DIAGNOSIS — I48.20 CHRONIC ATRIAL FIBRILLATION (H): ICD-10-CM

## 2020-12-22 DIAGNOSIS — I48.91 ATRIAL FIBRILLATION, UNSPECIFIED TYPE (H): ICD-10-CM

## 2020-12-22 LAB — INR PPP: 2.3 (ref 0.9–1.1)

## 2020-12-28 ENCOUNTER — COMMUNICATION - HEALTHEAST (OUTPATIENT)
Dept: INTERNAL MEDICINE | Facility: CLINIC | Age: 85
End: 2020-12-28

## 2020-12-28 DIAGNOSIS — T50.2X5A DIURETIC-INDUCED HYPOKALEMIA: ICD-10-CM

## 2020-12-28 DIAGNOSIS — E87.6 DIURETIC-INDUCED HYPOKALEMIA: ICD-10-CM

## 2020-12-29 ENCOUNTER — COMMUNICATION - HEALTHEAST (OUTPATIENT)
Dept: INTERNAL MEDICINE | Facility: CLINIC | Age: 85
End: 2020-12-29

## 2020-12-29 DIAGNOSIS — I48.20 CHRONIC ATRIAL FIBRILLATION (H): ICD-10-CM

## 2020-12-30 ENCOUNTER — COMMUNICATION - HEALTHEAST (OUTPATIENT)
Dept: INTERNAL MEDICINE | Facility: CLINIC | Age: 85
End: 2020-12-30

## 2020-12-30 DIAGNOSIS — I48.19 PERSISTENT ATRIAL FIBRILLATION (H): ICD-10-CM

## 2021-01-05 ENCOUNTER — COMMUNICATION - HEALTHEAST (OUTPATIENT)
Dept: INTERNAL MEDICINE | Facility: CLINIC | Age: 86
End: 2021-01-05

## 2021-01-05 DIAGNOSIS — T50.2X5A DIURETIC-INDUCED HYPOKALEMIA: ICD-10-CM

## 2021-01-05 DIAGNOSIS — E87.6 DIURETIC-INDUCED HYPOKALEMIA: ICD-10-CM

## 2021-01-06 ENCOUNTER — COMMUNICATION - HEALTHEAST (OUTPATIENT)
Dept: FAMILY MEDICINE | Facility: CLINIC | Age: 86
End: 2021-01-06

## 2021-01-06 RX ORDER — POTASSIUM CHLORIDE 750 MG/1
TABLET, EXTENDED RELEASE ORAL
Qty: 90 TABLET | Refills: 3 | Status: SHIPPED | OUTPATIENT
Start: 2021-01-06 | End: 2021-09-29

## 2021-01-19 ENCOUNTER — AMBULATORY - HEALTHEAST (OUTPATIENT)
Dept: LAB | Facility: CLINIC | Age: 86
End: 2021-01-19

## 2021-01-19 ENCOUNTER — COMMUNICATION - HEALTHEAST (OUTPATIENT)
Dept: ANTICOAGULATION | Facility: CLINIC | Age: 86
End: 2021-01-19

## 2021-01-19 DIAGNOSIS — I48.91 ATRIAL FIBRILLATION, UNSPECIFIED TYPE (H): ICD-10-CM

## 2021-01-19 DIAGNOSIS — I48.20 CHRONIC ATRIAL FIBRILLATION (H): ICD-10-CM

## 2021-01-19 LAB — INR PPP: 2.6 (ref 0.9–1.1)

## 2021-02-04 ENCOUNTER — AMBULATORY - HEALTHEAST (OUTPATIENT)
Dept: NURSING | Facility: CLINIC | Age: 86
End: 2021-02-04

## 2021-02-09 ENCOUNTER — COMMUNICATION - HEALTHEAST (OUTPATIENT)
Dept: ANTICOAGULATION | Facility: CLINIC | Age: 86
End: 2021-02-09

## 2021-02-09 ENCOUNTER — AMBULATORY - HEALTHEAST (OUTPATIENT)
Dept: LAB | Facility: CLINIC | Age: 86
End: 2021-02-09

## 2021-02-09 DIAGNOSIS — I48.91 ATRIAL FIBRILLATION, UNSPECIFIED TYPE (H): ICD-10-CM

## 2021-02-09 DIAGNOSIS — I48.20 CHRONIC ATRIAL FIBRILLATION (H): ICD-10-CM

## 2021-02-09 LAB — INR PPP: 3.2 (ref 0.9–1.1)

## 2021-02-23 ENCOUNTER — AMBULATORY - HEALTHEAST (OUTPATIENT)
Dept: LAB | Facility: CLINIC | Age: 86
End: 2021-02-23

## 2021-02-23 ENCOUNTER — COMMUNICATION - HEALTHEAST (OUTPATIENT)
Dept: ANTICOAGULATION | Facility: CLINIC | Age: 86
End: 2021-02-23

## 2021-02-23 DIAGNOSIS — I48.91 ATRIAL FIBRILLATION, UNSPECIFIED TYPE (H): ICD-10-CM

## 2021-02-23 DIAGNOSIS — I48.20 CHRONIC ATRIAL FIBRILLATION (H): ICD-10-CM

## 2021-02-23 LAB — INR PPP: 3.7 (ref 0.9–1.1)

## 2021-02-25 ENCOUNTER — AMBULATORY - HEALTHEAST (OUTPATIENT)
Dept: NURSING | Facility: CLINIC | Age: 86
End: 2021-02-25

## 2021-03-05 ENCOUNTER — RECORDS - HEALTHEAST (OUTPATIENT)
Dept: ADMINISTRATIVE | Facility: OTHER | Age: 86
End: 2021-03-05

## 2021-03-08 ENCOUNTER — RECORDS - HEALTHEAST (OUTPATIENT)
Dept: ADMINISTRATIVE | Facility: OTHER | Age: 86
End: 2021-03-08

## 2021-03-10 ENCOUNTER — COMMUNICATION - HEALTHEAST (OUTPATIENT)
Dept: INTERNAL MEDICINE | Facility: CLINIC | Age: 86
End: 2021-03-10

## 2021-03-16 ENCOUNTER — RECORDS - HEALTHEAST (OUTPATIENT)
Dept: ADMINISTRATIVE | Facility: OTHER | Age: 86
End: 2021-03-16

## 2021-03-17 ENCOUNTER — COMMUNICATION - HEALTHEAST (OUTPATIENT)
Dept: INTERNAL MEDICINE | Facility: CLINIC | Age: 86
End: 2021-03-17

## 2021-03-22 ENCOUNTER — COMMUNICATION - HEALTHEAST (OUTPATIENT)
Dept: INTERNAL MEDICINE | Facility: CLINIC | Age: 86
End: 2021-03-22

## 2021-03-22 ENCOUNTER — OFFICE VISIT - HEALTHEAST (OUTPATIENT)
Dept: INTERNAL MEDICINE | Facility: CLINIC | Age: 86
End: 2021-03-22

## 2021-03-22 DIAGNOSIS — I48.20 CHRONIC ATRIAL FIBRILLATION (H): ICD-10-CM

## 2021-03-22 DIAGNOSIS — Z86.73 HISTORY OF CVA (CEREBROVASCULAR ACCIDENT): ICD-10-CM

## 2021-03-22 DIAGNOSIS — Z95.810 ICD (IMPLANTABLE CARDIOVERTER-DEFIBRILLATOR), SINGLE, IN SITU: ICD-10-CM

## 2021-03-22 DIAGNOSIS — I10 ESSENTIAL HYPERTENSION: ICD-10-CM

## 2021-03-22 RX ORDER — LANOLIN ALCOHOL/MO/W.PET/CERES
3 CREAM (GRAM) TOPICAL
Status: SHIPPED | COMMUNITY
Start: 2021-03-22 | End: 2023-01-01

## 2021-04-14 ENCOUNTER — RECORDS - HEALTHEAST (OUTPATIENT)
Dept: ADMINISTRATIVE | Facility: OTHER | Age: 86
End: 2021-04-14

## 2021-04-16 ENCOUNTER — COMMUNICATION - HEALTHEAST (OUTPATIENT)
Dept: FAMILY MEDICINE | Facility: CLINIC | Age: 86
End: 2021-04-16

## 2021-04-16 DIAGNOSIS — E53.8 VITAMIN B12 DEFICIENCY (NON ANEMIC): ICD-10-CM

## 2021-04-20 ENCOUNTER — OFFICE VISIT - HEALTHEAST (OUTPATIENT)
Dept: INTERNAL MEDICINE | Facility: CLINIC | Age: 86
End: 2021-04-20

## 2021-04-20 DIAGNOSIS — I48.20 CHRONIC ATRIAL FIBRILLATION (H): ICD-10-CM

## 2021-04-20 DIAGNOSIS — R60.9 EDEMA, UNSPECIFIED TYPE: ICD-10-CM

## 2021-04-20 DIAGNOSIS — T50.2X5A DIURETIC-INDUCED HYPOKALEMIA: ICD-10-CM

## 2021-04-20 DIAGNOSIS — I63.40 CEREBROVASCULAR ACCIDENT (CVA) DUE TO EMBOLISM OF CEREBRAL ARTERY (H): ICD-10-CM

## 2021-04-20 DIAGNOSIS — E87.6 DIURETIC-INDUCED HYPOKALEMIA: ICD-10-CM

## 2021-04-20 DIAGNOSIS — N18.31 STAGE 3A CHRONIC KIDNEY DISEASE (H): ICD-10-CM

## 2021-04-20 DIAGNOSIS — K21.9 GASTROESOPHAGEAL REFLUX DISEASE: ICD-10-CM

## 2021-04-20 RX ORDER — FUROSEMIDE 20 MG
20 TABLET ORAL DAILY
Qty: 90 TABLET | Refills: 3 | Status: SHIPPED
Start: 2021-04-20 | End: 2021-09-29

## 2021-04-20 RX ORDER — PANTOPRAZOLE SODIUM 40 MG/1
40 TABLET, DELAYED RELEASE ORAL DAILY
Qty: 90 TABLET | Refills: 3 | Status: SHIPPED | OUTPATIENT
Start: 2021-04-20 | End: 2021-09-29

## 2021-04-21 ENCOUNTER — COMMUNICATION - HEALTHEAST (OUTPATIENT)
Dept: INTERNAL MEDICINE | Facility: CLINIC | Age: 86
End: 2021-04-21

## 2021-04-21 LAB
ANION GAP SERPL CALCULATED.3IONS-SCNC: 9 MMOL/L (ref 5–18)
BUN SERPL-MCNC: 20 MG/DL (ref 8–28)
CALCIUM SERPL-MCNC: 9.5 MG/DL (ref 8.5–10.5)
CHLORIDE BLD-SCNC: 103 MMOL/L (ref 98–107)
CO2 SERPL-SCNC: 28 MMOL/L (ref 22–31)
CREAT SERPL-MCNC: 0.99 MG/DL (ref 0.6–1.1)
GFR SERPL CREATININE-BSD FRML MDRD: 53 ML/MIN/1.73M2
GLUCOSE BLD-MCNC: 81 MG/DL (ref 70–125)
POTASSIUM BLD-SCNC: 4.2 MMOL/L (ref 3.5–5)
SODIUM SERPL-SCNC: 140 MMOL/L (ref 136–145)

## 2021-05-12 ENCOUNTER — AMBULATORY - HEALTHEAST (OUTPATIENT)
Dept: PHARMACY | Facility: HOSPITAL | Age: 86
End: 2021-05-12

## 2021-05-17 ENCOUNTER — OFFICE VISIT - HEALTHEAST (OUTPATIENT)
Dept: INTERNAL MEDICINE | Facility: CLINIC | Age: 86
End: 2021-05-17

## 2021-05-17 DIAGNOSIS — Z86.73 HISTORY OF CVA (CEREBROVASCULAR ACCIDENT): ICD-10-CM

## 2021-05-17 DIAGNOSIS — I10 ESSENTIAL HYPERTENSION: ICD-10-CM

## 2021-05-17 DIAGNOSIS — M70.61 TROCHANTERIC BURSITIS OF RIGHT HIP: ICD-10-CM

## 2021-05-17 ASSESSMENT — MIFFLIN-ST. JEOR: SCORE: 962.08

## 2021-05-24 ENCOUNTER — RECORDS - HEALTHEAST (OUTPATIENT)
Dept: ADMINISTRATIVE | Facility: CLINIC | Age: 86
End: 2021-05-24

## 2021-05-25 ENCOUNTER — RECORDS - HEALTHEAST (OUTPATIENT)
Dept: ADMINISTRATIVE | Facility: CLINIC | Age: 86
End: 2021-05-25

## 2021-05-26 VITALS — SYSTOLIC BLOOD PRESSURE: 126 MMHG | OXYGEN SATURATION: 98 % | DIASTOLIC BLOOD PRESSURE: 60 MMHG | HEART RATE: 77 BPM

## 2021-05-27 VITALS
HEART RATE: 84 BPM | OXYGEN SATURATION: 98 % | WEIGHT: 135.06 LBS | SYSTOLIC BLOOD PRESSURE: 138 MMHG | HEIGHT: 61 IN | DIASTOLIC BLOOD PRESSURE: 74 MMHG | RESPIRATION RATE: 20 BRPM | BODY MASS INDEX: 25.5 KG/M2

## 2021-05-27 NOTE — TELEPHONE ENCOUNTER
Medication Request  Medication name:   losartan (COZAAR) 50 MG tablet   3/3/2019     Sig - Route: Take 50 mg by mouth daily. - Oral      metoprolol tartrate (LOPRESSOR) 25 MG tablet   3/2/2019     Sig - Route: Take 25 mg by mouth 2 (two) times a day. - Oral      Pharmacy Name and Location: [facility] #[id number]  Reason for request: Historical medications  When did you use medication last?:  Unknown  Patient offered appointment:  patient declined  Okay to leave a detailed message: no

## 2021-05-27 NOTE — TELEPHONE ENCOUNTER
Who is calling:  Patient  Reason for Call:  Patient calling back  to clarify dosing instructions for today from [facility].  Date of last appointment with primary care: na  Okay to leave a detailed message: Yes

## 2021-05-27 NOTE — TELEPHONE ENCOUNTER
ANTICOAGULATION  MANAGEMENT    Assessment     Today's INR result of 2.5 is Therapeutic (goal INR of 2.0-3.0)        Warfarin taken as previously instructed    No new diet changes affecting INR    No new medication/supplements affecting INR    Continues to tolerate warfarin with no reported s/s of bleeding or thromboembolism     Previous INR was Therapeutic    Plan:     Left a detailed message for Helen regarding INR result and instructed:     Warfarin Dosing Instructions:  Continue current warfarin dose 1.25 mg daily on mon/wed/fri; and 2 mg daily rest of week  (0 % change)    Instructed patient to follow up no later than: one month      Instructed to call the [facility] for any changes, questions or concerns. (#[phone])   ?   Jack Curtis RN    Subjective/Objective:      Helne Beckham, a 88 y.o. female is on warfarin.     Helen reports:     Home warfarin dose: as updated on anticoagulation calendar per template     Missed doses: No     Medication changes:  No     S/S of bleeding or thromboembolism:  No     New Injury or illness:  No     Changes in diet or alcohol consumption:  No     Upcoming surgery, procedure or cardioversion:  No    Anticoagulation Episode Summary     Current INR goal:   2.0-3.0   TTR:   75.2 % (4.3 y)   Next INR check:   4/23/2019   INR from last check:   2.50 (3/26/2019)   Weekly max warfarin dose:      Target end date:      INR check location:      Preferred lab:      Send INR reminders to:   ANTICOAGULATION POOL A (WBY,WBE,MID,RSC)    Indications    Chronic atrial fibrillation (H) [I48.2]           Comments:            Anticoagulation Care Providers     Provider Role Specialty Phone number    Surya Delaney MD Referring Internal Medicine [phone]

## 2021-05-27 NOTE — TELEPHONE ENCOUNTER
Patient Returning Call  Reason for call:  Patient returned missed call from 3/26  Information relayed to patient:  Please call patient back when available. She has additional questions about dosing and INR results.  Patient has additional questions:  No  If YES, what are your questions/concerns:  n/a  Okay to leave a detailed message?: Yes

## 2021-05-27 NOTE — TELEPHONE ENCOUNTER
ANTICOAGULATION  MANAGEMENT    Assessment     Today's INR result of 2.5 is Therapeutic (goal INR of 2.0-3.0)        Warfarin taken as previously instructed    No new diet changes affecting INR    No new medication/supplements affecting INR    Continues to tolerate warfarin with no reported s/s of bleeding or thromboembolism     Previous INR was Therapeutic    Plan:     Left a detailed message for Helen regarding INR result and instructed:     Warfarin Dosing Instructions:  Continue current warfarin dose 1.25 mg daily on mon/wed/fri; and 2 mg daily rest of week  (0 % change)    Instructed patient to follow up no later than: 2-4 weeks        Instructed to call the [facility] for any changes, questions or concerns. (#[phone])   ?   Jack Curtis RN    Subjective/Objective:      Helen Beckham, a 88 y.o. female is on warfarin.     Helen reports:     Home warfarin dose: as updated on anticoagulation calendar per template     Missed doses: No     Medication changes:  No     S/S of bleeding or thromboembolism:  No     New Injury or illness:  No     Changes in diet or alcohol consumption:  No     Upcoming surgery, procedure or cardioversion:  No    Anticoagulation Episode Summary     Current INR goal:   2.0-3.0   TTR:   75.4 % (4.3 y)   Next INR check:   5/7/2019   INR from last check:   2.50 (4/9/2019)   Weekly max warfarin dose:      Target end date:      INR check location:      Preferred lab:      Send INR reminders to:   ANTICOAGULATION POOL A (WBY,WBE,MID,RSC)    Indications    Chronic atrial fibrillation (H) [I48.2]           Comments:            Anticoagulation Care Providers     Provider Role Specialty Phone number    Surya Delaney MD Referring Internal Medicine [phone]

## 2021-05-28 ENCOUNTER — RECORDS - HEALTHEAST (OUTPATIENT)
Dept: ADMINISTRATIVE | Facility: CLINIC | Age: 86
End: 2021-05-28

## 2021-05-28 NOTE — TELEPHONE ENCOUNTER
ANTICOAGULATION  MANAGEMENT    Assessment     Today's INR result of 1.8 is Subtherapeutic (goal INR of 2.0-3.0)        Warfarin taken as previously instructed    No new diet changes affecting INR    No new medication/supplements affecting INR    Interaction between Prednisone  and warfarin may be affecting INR 5/14-5/21 for sciatic    Continues to tolerate warfarin with no reported s/s of bleeding or thromboembolism     Previous INR was Therapeutic    Plan:     Spoke with Helen regarding INR result and instructed:     Warfarin Dosing Instructions:  have 2 mg tonight to boost, then then continue current warfarin dose 1.25 mg daily on mon/wed/fri; and 1.25 mg daily rest of week      Instructed patient to follow up no later than: one week      Helen verbalizes understanding and agrees to warfarin dosing plan.    Instructed to call the [facility] for any changes, questions or concerns. (#[phone])   ?   Jack Curtis RN    Subjective/Objective:      Helen Beckham, a 88 y.o. female is on warfarin.     Helen reports:     Home warfarin dose: as updated on anticoagulation calendar per template     Missed doses: No     Medication changes:  above     S/S of bleeding or thromboembolism:  No     New Injury or illness:  No     Changes in diet or alcohol consumption:  No     Upcoming surgery, procedure or cardioversion:  No    Anticoagulation Episode Summary     Current INR goal:   2.0-3.0   TTR:   75.8 % (4.5 y)   Next INR check:   5/24/2019   INR from last check:   1.80! (5/17/2019)   Weekly max warfarin dose:      Target end date:      INR check location:      Preferred lab:      Send INR reminders to:   ANTICOAGULATION POOL A (WBY,WBE,MID,RSC)    Indications    Chronic atrial fibrillation (H) [I48.2]           Comments:            Anticoagulation Care Providers     Provider Role Specialty Phone number    Surya Delaney MD Referring Internal Medicine [phone]

## 2021-05-28 NOTE — TELEPHONE ENCOUNTER
ANTICOAGULATION  MANAGEMENT    Assessment     Today's INR result of 2.6 is Therapeutic (goal INR of 2.0-3.0)        Warfarin taken as previously instructed    No new diet changes affecting INR    No new medication/supplements affecting INR    Continues to tolerate warfarin with no reported s/s of bleeding or thromboembolism     Previous INR was Therapeutic    Plan:     Left a detailed message for Helen regarding INR result and instructed:     Warfarin Dosing Instructions:  Continue current warfarin dose 1.25 mg daily on mon/wed/fri; and 2 mg daily rest of week  (0 % change)    Instructed patient to follow up no later than: three weeks      Instructed to call the [facility] for any changes, questions or concerns. (#[phone])   ?   Jack Curtis RN    Subjective/Objective:      Helen Beckham, a 88 y.o. female is on warfarin.     Helen reports:     Home warfarin dose: as updated on anticoagulation calendar per template     Missed doses: No     Medication changes:  No     S/S of bleeding or thromboembolism:  No     New Injury or illness:  No     Changes in diet or alcohol consumption:  No     Upcoming surgery, procedure or cardioversion:  No    Anticoagulation Episode Summary     Current INR goal:   2.0-3.0   TTR:   75.8 % (4.4 y)   Next INR check:   5/21/2019   INR from last check:   2.60 (4/30/2019)   Weekly max warfarin dose:      Target end date:      INR check location:      Preferred lab:      Send INR reminders to:   ANTICOAGULATION POOL A (WBY,WBE,MID,RSC)    Indications    Chronic atrial fibrillation (H) [I48.2]           Comments:            Anticoagulation Care Providers     Provider Role Specialty Phone number    Surya Delaney MD Referring Internal Medicine [phone]

## 2021-05-28 NOTE — TELEPHONE ENCOUNTER
Pt is due for 2nd Shingrix.  Added pt label to shingrix vial.  She can receive it on 5/31/19 at PCP appt.

## 2021-05-28 NOTE — TELEPHONE ENCOUNTER
Helen called me and told me she thinks she hasn't had any Shingrex (see 5/31/19 appt). She seems to be anxious about knowing if she will be able to get her first shot or if she is due for her second.  She did not expect a call back, but it would be helpful to know this before she comes in.

## 2021-05-28 NOTE — TELEPHONE ENCOUNTER
RN cannot approve Refill Request    RN can NOT refill this medication historical medication requested.       Beena Stephen, [facility] Triage/Med Refill 4/26/2019    Requested Prescriptions   Pending Prescriptions Disp Refills     pantoprazole (PROTONIX) 40 MG tablet [Pharmacy Med Name: Pantoprazole Sodium Oral Tablet Delayed Release 40 MG] 90 tablet 1     Sig: TAKE ONE TABLET BY MOUTH ONE TIME DAILY       GI Medications Refill Protocol Passed - 4/24/2019  9:30 AM        Passed - PCP or prescribing provider visit in last 12 or next 3 months.     Last office visit with prescriber/PCP: 3/12/2019 Surya Delaney MD OR same dept: 3/12/2019 Surya Delaney MD OR same specialty: 3/12/2019 Surya Delaney MD  Last physical: Visit date not found Last MTM visit: Visit date not found   Next visit within 3 mo: Visit date not found  Next physical within 3 mo: Visit date not found  Prescriber OR PCP: Surya Delaney MD  Last diagnosis associated with med order: 1. Esophageal reflux  - pantoprazole (PROTONIX) 40 MG tablet [Pharmacy Med Name: Pantoprazole Sodium Oral Tablet Delayed Release 40 MG]; TAKE ONE TABLET BY MOUTH ONE TIME DAILY   Dispense: 90 tablet; Refill: 1    If protocol passes may refill for 12 months if within 3 months of last provider visit (or a total of 15 months).

## 2021-05-29 NOTE — TELEPHONE ENCOUNTER
ANTICOAGULATION  MANAGEMENT    Assessment     Today's INR result of 2.1 is Therapeutic (goal INR of 2.0-3.0)        Warfarin taken as previously instructed    No new diet changes affecting INR    No new medication/supplements affecting INR    Continues to tolerate warfarin with no reported s/s of bleeding or thromboembolism     Previous INR was Therapeutic     Injection scheduled next week on Tue 6/4, hold/resume orders per PCP in today's OV note. No bridge    Plan:     Spoke with Helen regarding INR result and instructed:     Warfarin Dosing Instructions: Hold warfarin starting tonight (will only be a 4 day hold but Helen says she discussed this with PCP in clinic today). When given the okay by provider to resume, resume current warfarin dose    1.25 mg every Mon, Wed, Fri; 2 mg all other days         Instructed patient to follow up no later than: 1 week after resuming warfarin    Education provided: importance of therapeutic range, target INR goal and significance of current INR result and importance of notifying clinic of upcoming surgeries and procedures 2 weeks in advance    Helen verbalizes understanding and agrees to warfarin dosing plan.    Instructed to call the [facility] for any changes, questions or concerns. (#[phone])   ?   Esther Bonilla RN    Subjective/Objective:      Helen Beckham, a 88 y.o. female is on warfarin.     Helen reports:     Home warfarin dose: verbally confirmed home dose with Helen and updated on anticoagulation calendar     Missed doses: No     Medication changes:  No     S/S of bleeding or thromboembolism:  No     New Injury or illness:  No     Changes in diet or alcohol consumption:  No     Upcoming surgery, procedure or cardioversion:  Yes: spinal injection Tue 6/4    Anticoagulation Episode Summary     Current INR goal:   2.0-3.0   TTR:   75.9 % (4.5 y)   Next INR check:   6/11/2019   INR from last check:   2.10 (5/31/2019)   Weekly max warfarin dose:      Target end  date:      INR check location:      Preferred lab:      Send INR reminders to:   [facility]    Indications    Chronic atrial fibrillation (H) [I48.2]           Comments:            Anticoagulation Care Providers     Provider Role Specialty Phone number    Surya Delaney MD Referring Internal Medicine [phone]

## 2021-05-29 NOTE — TELEPHONE ENCOUNTER
Orders being requested: hold orders for Coumadin?warfarin  Reason service is needed/diagnosis: patient needs to be scheduled for am epidural steroid injection and they need to hold Coumadin/Warfarin  When are orders needed by: as soon as possible so patient can get scheduled.   Where to send Orders: Phone:  [phone]  Okay to leave detailed message?  Yes

## 2021-05-29 NOTE — TELEPHONE ENCOUNTER
Who is calling:  Patient  Reason for Call:  The patient had a procedure at her Ortho clinic today and had her INR checked there. She is reporting it as 1.1 6/4. Please return her call to advise.  Okay to leave a detailed message: Yes

## 2021-05-29 NOTE — TELEPHONE ENCOUNTER
Pharmacist called in states there is WESTON code on the coumadin 1 mg table Rx.  The pharmacist ask the PCP clarification about WESTON code.      Please advise        Torin Rivers RN, [facility] Triage/Med Refill 5/24/2019 6:31 PM

## 2021-05-29 NOTE — TELEPHONE ENCOUNTER
ANTICOAGULATION  MANAGEMENT    Assessment     Today's INR result of 1.9 is Subtherapeutic (goal INR of 2.0-3.0)        Warfarin taken as previously instructed    No new diet changes affecting INR    No new medication/supplements affecting INR    Continues to tolerate warfarin with no reported s/s of bleeding or thromboembolism     Previous INR was Therapeutic    Plan:     Left a detailed message for Helen regarding INR result and instructed:     Warfarin Dosing Instructions:  3.25 mg tonight to boost then continue current warfarin dose 1.25 mg daily on mon/wed/fri; and 2 mg daily rest of week  (0 % change)    Instructed patient to follow up no later than: two weeks      Instructed to call the [facility] for any changes, questions or concerns. (#[phone])   ?   Jack Curtis RN    Subjective/Objective:      Helen Beckham, a 88 y.o. female is on warfarin.     Helen reports:     Home warfarin dose: as updated on anticoagulation calendar per template     Missed doses: No     Medication changes:  No     S/S of bleeding or thromboembolism:  No     New Injury or illness:  No     Changes in diet or alcohol consumption:  No     Upcoming surgery, procedure or cardioversion:  No    Anticoagulation Episode Summary     Current INR goal:   2.0-3.0   TTR:   75.7 % (4.5 y)   Next INR check:   6/25/2019   INR from last check:   1.90! (6/11/2019)   Weekly max warfarin dose:      Target end date:      INR check location:      Preferred lab:      Send INR reminders to:   [facility]    Indications    Chronic atrial fibrillation (H) [I48.2]           Comments:            Anticoagulation Care Providers     Provider Role Specialty Phone number    Surya Delaney MD Referring Internal Medicine [phone]

## 2021-05-29 NOTE — PROGRESS NOTES
ASSESSMENT:  1. Lumbar radiculopathy  She had a CT of the lumbar spine.  I do not see the results.  She has an epidural.  Injections scheduled at [facility] next week.  Should be off of warfarin 5 days prior to the injection.  Notes considerable discomfort related to her low back.  He is not a candidate for NSAIDs given warfarin use.  Tylenol provides incomplete relief.  After discussion, she will try tramadol.  Her discomfort did not change with a Medrol Dosepak  - traMADol (ULTRAM) 50 mg tablet; Take 1 tablet (50 mg total) by mouth every 6 (six) hours as needed for pain.  Dispense: 30 tablet; Refill: 0    2. Chronic atrial fibrillation (H)  She is on warfarin as an anticoagulant.  Heart rate is in the desired range.  Warfarin may be held 5 days preinjection.  No bridging is required.  - INR    3.  Diastolic heart failure  Currently appears well compensated.  She is on furosemide 20 mg daily.    4.  Essential hypertension:  Good control.  She is on losartan and metoprolol    5.  Cervicalgia:  Discomfort is nonradicular.  Conservative management is advised    PLAN:  Patient Instructions   1.  Skip coumadin 5 days prior to back injection.    2.  Resume usual coumadin dose after injection    3.. Get Shingrix when back pain improves    4.  Heat to neck.  Tylenol for pain    5.  See in three months or as needed.    6.  Try Tramadol for back and neck pain        Medications Discontinued During This Encounter   Medication Reason     pantoprazole (PROTONIX) 40 MG tablet Duplicate order       Return in about 3 months (around 8/31/2019) for Recheck.      ASSESSED PROBLEMS:  1. Lumbar radiculopathy  traMADol (ULTRAM) 50 mg tablet   2. Chronic atrial fibrillation (H)  INR       CHIEF COMPLAINT:  Chief Complaint   Patient presents with     Neck Pain     Injections     OK to get second shingle, becuase i will get my chortisone injection from [facility] on next tuesday?     INR Check       HISTORY OF PRESENT ILLNESS:  Helen is a 88  "y.o. female presenting to the clinic today with complaints of hip/back and neck pain.     Hip/Back Pain: Her right \"hip\" has been bothering her since two days after Mother's Day. She is not sure what could have caused this but notes that she did sleep on the couch with her feet hanging off of it for a while instead of putting her feet up. The pain is in the right posterior hip or buttocks region. She was seen by ortho and had a CT done of the lumbar spine. She is now scheduled for an injection. She was told to hold warfarin for this and inquires if that is truly okay. This will be her second day off of the warfarin. The pain is very bothersome. It is radiating down the leg, and she if having trouble walking because of it. She does not have weakness, just pain. She has less pain sleeping in her recliner than her bed. She was given a week long course of prednisone, but it did not help. She has been using the seat warmer in her car for heat. She has been ambulating with a cane since this started to bother her more.     Neck Pain: She has pain in the right side and back of her neck. The pain moves up into the side of her head, and she is concerned she could have temporal arteritis. She has a neck pillow from a physical therapist and is trying to work on positioning with the neck. She has tried using heat. Her neck started hurting before her lower back. She has taken tramadol for pain in the past, which helped but caused some visual fuzziness.     Atrial Fibrillation: She has not noticed any fluttering or pounding of her heart. Dr. Lara suggested she stay on warfarin rather than switch to Eliquis, which she is fine with.     CHF: She has not taken her diuretic yet today, and her legs are more swollen as a result.     ICD, in Situ: She is scheduled for a pacemaker check in about a week.     Health Maintenance: She got her first dose of the Shingrix in August 2018 but has not had the second dose yet.     REVIEW OF " "SYSTEMS:   Her breathing has been okay. All other systems are negative.    PFSH:  She does some exercises.     TOBACCO USE:  Social History     Tobacco Use   Smoking Status Never Smoker   Smokeless Tobacco Never Used       VITALS:  Vitals:    05/31/19 1133 05/31/19 1151   BP: 146/60 118/60   Patient Site: Left Arm Left Arm   Patient Position: Sitting Sitting   Cuff Size: Adult Regular Adult Regular   Pulse: (!) 52    SpO2: 97%    Weight: 130 lb (59 kg)    Height: 5' 1.5\" (1.562 m)      Wt Readings from Last 3 Encounters:   05/31/19 130 lb (59 kg)   03/12/19 125 lb (56.7 kg)   02/19/19 128 lb (58.1 kg)     Body mass index is 24.17 kg/m .    PHYSICAL EXAM:  Constitutional:   Reveals an alert, elderly woman who appears uncomfortable with changes in position. Ambulates with a single pronged cane. Limps favoring right leg.  Vitals: per nursing notes.  HEENT: Atraumatic.   Neck:  Supple, no carotid bruits or adenopathy.  Back: Moderate kyphoscoliosis  Lungs: Clear to A&P without rales or wheezes.  Respiratory effort normal.  Cardiac:   Regular rate and rhythm, normal S1, S2, no murmur or gallop.  Abdomen:  Soft, active bowel sounds without bruits, mass, or tenderness.  Extremities:  1+ peripheral edema, wearing compression stockings.    Neuro:  Positive straight leg raise at about 30 degrees on the right, negative on left. Toes raises and heel walking seem symmetrical.   Psychiatric:  Memory intact, mood appropriate.    ADDITIONAL HISTORY SUMMARIZED (2): Reviewed 5/22/2019 [facility] note regarding lumbar radiculopathy.   DECISION TO OBTAIN EXTRA INFORMATION (1): None.   RADIOLOGY TESTS (1): None.  LABS (1): Labs from August 2018 and 3/12/2019 reviewed; lipid, BMP  MEDICINE TESTS (1): None.  INDEPENDENT REVIEW (2 each): None.     The visit lasted a total of 20 minutes face to face with the patient. Over 50% of the time was spent counseling and educating the patient about her hip and neck pain.    Gareth CENTENO am " scribing for and in the presence of, Dr. Delaney.    I, Surya Delaney, personally performed the services described in this documentation, as scribed by Gareth Vaca in my presence, and it is both accurate and complete.    Dragon dictation was used for this note.  Speech recognition errors are a possibility.    MEDICATIONS:  Current Outpatient Medications   Medication Sig Dispense Refill     cholecalciferol, vitamin D3, 1,000 unit tablet Take 1,000 Units by mouth daily.       clindamycin (CLEOCIN) 300 MG capsule Prior to dental appointments  0     COUMADIN 1 mg tablet Take 1 to 2 tablets (1 to 2 mg) by mouth daily. Adjust dose based on INR results as directed.(uses in conjunction with 2.5 mg tabs) 60 tablet 1     COUMADIN 2.5 mg tablet TAKE ONE-HALF TO ONE TABLET (1.25 MG TO 2.5 MG) BY MOUTH DAILY. ADJUST DOSE BASED ON INR RESULTS AS DIRECTED. 60 tablet 1     cyanocobalamin 1000 MCG tablet Take 1,000 mcg by mouth daily.       furosemide (LASIX) 20 MG tablet Take 1 tablet (20 mg total) by mouth daily. 90 tablet 3     losartan (COZAAR) 50 MG tablet Take 1 tablet (50 mg total) by mouth daily. 90 tablet 3     metoprolol tartrate (LOPRESSOR) 25 MG tablet Take 1 tablet (25 mg total) by mouth 2 (two) times a day. 180 tablet 3     pantoprazole (PROTONIX) 40 MG tablet Take 40 mg by mouth daily.       potassium chloride (KLOR-CON) 10 MEQ CR tablet Take 1 tablet (10 mEq total) by mouth daily. 90 tablet 3     traMADol (ULTRAM) 50 mg tablet Take 1 tablet (50 mg total) by mouth every 6 (six) hours as needed for pain. 30 tablet 0     No current facility-administered medications for this visit.        Total data points: 3

## 2021-05-29 NOTE — TELEPHONE ENCOUNTER
ANTICOAGULATION  MANAGEMENT    Assessment     Today's INR result of 3.3 is Subtherapeutic (goal INR of 2.0-3.0)        Warfarin taken as previously instructed    No new diet changes affecting INR    No new medication/supplements affecting INR    Continues to tolerate warfarin with no reported s/s of bleeding or thromboembolism     Previous INR was Subtherapeutic had booster dose    Plan:     Spoke with Helen regarding INR result and instructed:     Warfarin Dosing Instructions:  Continue current warfarin dose 1.25 mg daily on mon/wed/fri; and 2 mg daily rest of week     Instructed patient to follow up no later than: two weeks      Helen verbalizes understanding and agrees to warfarin dosing plan.    Instructed to call the [facility] for any changes, questions or concerns. (#[phone])   ?   Jack Curtis RN    Subjective/Objective:      Helen Beckham, a 88 y.o. female is on warfarin.     Helen reports:     Home warfarin dose: verbally confirmed home dose with Helen and updated on anticoagulation calendar     Missed doses: No     Medication changes:  No     S/S of bleeding or thromboembolism:  No     New Injury or illness:  No     Changes in diet or alcohol consumption:  No     Upcoming surgery, procedure or cardioversion:  No    Anticoagulation Episode Summary     Current INR goal:   2.0-3.0   TTR:   75.7 % (4.5 y)   Next INR check:   7/2/2019   INR from last check:      Weekly max warfarin dose:      Target end date:      INR check location:      Preferred lab:      Send INR reminders to:   [facility]    Indications    Chronic atrial fibrillation (H) [I48.2]           Comments:            Anticoagulation Care Providers     Provider Role Specialty Phone number    Surya Delaney MD Referring Internal Medicine [phone]

## 2021-05-29 NOTE — TELEPHONE ENCOUNTER
ANTICOAGULATION  MANAGEMENT    Assessment     Today's INR result of 3.0 is Therapeutic (goal INR of 2.0-3.0)        Warfarin taken as previously instructed    No new diet changes affecting INR    No new medication/supplements affecting INR    Continues to tolerate warfarin with no reported s/s of bleeding or thromboembolism     Previous INR was Therapeutic    Plan:     Spoke with Helen regarding INR result and instructed:     Warfarin Dosing Instructions:  Continue current warfarin dose 1.25 mg daily on mon/wed/fri; and 2 mg daily rest of week  (0 % change)    Instructed patient to follow up no later than: one week with ov      Helen verbalizes understanding and agrees to warfarin dosing plan.    Instructed to call the [facility] for any changes, questions or concerns. (#[phone])   ?   Jack Curtis RN    Subjective/Objective:      Helen Beckham, a 88 y.o. female is on warfarin.     Helen reports:     Home warfarin dose: as updated on anticoagulation calendar per template     Missed doses: No     Medication changes:  No     S/S of bleeding or thromboembolism:  No     New Injury or illness:  No     Changes in diet or alcohol consumption:  No     Upcoming surgery, procedure or cardioversion:  No    Anticoagulation Episode Summary     Current INR goal:   2.0-3.0   TTR:   75.8 % (4.5 y)   Next INR check:   5/31/2019   INR from last check:   3.00 (5/24/2019)   Weekly max warfarin dose:      Target end date:      INR check location:      Preferred lab:      Send INR reminders to:   [facility]    Indications    Chronic atrial fibrillation (H) [I48.2]           Comments:            Anticoagulation Care Providers     Provider Role Specialty Phone number    Surya Delaney MD Referring Internal Medicine [phone]

## 2021-05-29 NOTE — PATIENT INSTRUCTIONS - HE
1.  Skip coumadin 5 days prior to back injection.    2.  Resume usual coumadin dose after injection    3.. Get Shingrix when back pain improves    4.  Heat to neck.  Tylenol for pain    5.  See in three months or as needed.    6.  Try Tramadol for back and neck pain

## 2021-05-29 NOTE — TELEPHONE ENCOUNTER
Hold Warfarin for five days prior to injection.  Restart usual dose after procedure.  No need to bridge.  I would advise rechecking the INR the days prior to the procedure.

## 2021-05-29 NOTE — TELEPHONE ENCOUNTER
Left detailed message for Luciano relaying PCP message.  Advised Luciano to call back with further questions.

## 2021-05-30 VITALS — HEIGHT: 62 IN | BODY MASS INDEX: 25.76 KG/M2 | WEIGHT: 140 LBS

## 2021-05-30 VITALS — WEIGHT: 136 LBS | HEIGHT: 62 IN | BODY MASS INDEX: 25.03 KG/M2

## 2021-05-30 VITALS — BODY MASS INDEX: 25.03 KG/M2 | WEIGHT: 136 LBS | HEIGHT: 62 IN

## 2021-05-30 VITALS — BODY MASS INDEX: 25.64 KG/M2 | HEIGHT: 62 IN | WEIGHT: 139.3 LBS

## 2021-05-30 VITALS — BODY MASS INDEX: 25.03 KG/M2 | HEIGHT: 62 IN | WEIGHT: 136 LBS

## 2021-05-30 VITALS — BODY MASS INDEX: 27.07 KG/M2 | WEIGHT: 145.6 LBS

## 2021-05-30 NOTE — TELEPHONE ENCOUNTER
Who is calling:  Patient   Reason for Call:  Patient said that she is waiting for a call back from the nurse about her INR.   Date of last appointment with primary care: n/a  Okay to leave a detailed message: Yes

## 2021-05-30 NOTE — TELEPHONE ENCOUNTER
Who is calling:  Patient  Reason for Call:  Needs to clarify dosing with [facility] from 07/02/19  Date of last appointment with primary care: na  Okay to leave a detailed message: Yes

## 2021-05-30 NOTE — TELEPHONE ENCOUNTER
ANTICOAGULATION  MANAGEMENT    Assessment     Today's INR result of 1.5 is Subtherapeutic (goal INR of 2.0-3.0)        Warfarin taken as previously instructed confirms none missed    No new diet changes affecting INR    No new medication/supplements affecting INR    Continues to tolerate warfarin with no reported s/s of bleeding or thromboembolism     Previous INR was Therapeutic    Plan:     Spoke with Helen regarding INR result and instructed:     Warfarin Dosing Instructions:  Change warfarin dose to 2 mg daily on tue/thu/sat; and 1.25 mg daily rest of week  (7 % change)    Instructed patient to follow up no later than: one week      helen verbalizes understanding and agrees to warfarin dosing plan.    Instructed to call the [facility] for any changes, questions or concerns. (#[phone])   ?   Jack Curtis RN    Subjective/Objective:      Helen Beckham, a 88 y.o. female is on warfarin.     Helen reports:     Home warfarin dose: as updated on anticoagulation calendar per template     Missed doses: No     Medication changes:  No     S/S of bleeding or thromboembolism:  No     New Injury or illness:  No     Changes in diet or alcohol consumption:  No     Upcoming surgery, procedure or cardioversion:  No    Anticoagulation Episode Summary     Current INR goal:   2.0-3.0   TTR:   74.9 % (4.6 y)   Next INR check:   7/23/2019   INR from last check:   1.50! (7/16/2019)   Weekly max warfarin dose:      Target end date:      INR check location:      Preferred lab:      Send INR reminders to:   [facility]    Indications    Chronic atrial fibrillation (H) [I48.2]           Comments:            Anticoagulation Care Providers     Provider Role Specialty Phone number    Surya Delaney MD Referring Internal Medicine [phone]

## 2021-05-30 NOTE — TELEPHONE ENCOUNTER
ANTICOAGULATION  MANAGEMENT    Assessment     Today's INR result of 1.9 is Subtherapeutic (goal INR of 2.0-3.0)        Warfarin taken as previously instructed    No new diet changes affecting INR    No new medication/supplements affecting INR    Continues to tolerate warfarin with no reported s/s of bleeding or thromboembolism     Previous INR was Subtherapeutic    Plan:     Spoke with Helen regarding INR result and instructed:     Warfarin Dosing Instructions:  Change warfarin dose to 1.25 mg daily on mon/wed/fri; and 2 mg daily rest of week  (7 % change)    Instructed patient to follow up no later than: two weeks      Helen verbalizes understanding and agrees to warfarin dosing plan.    Instructed to call the [facility] for any changes, questions or concerns. (#[phone])   ?   Jack Curtis RN    Subjective/Objective:      Helen Beckham, a 88 y.o. female is on warfarin.     Helen reports:     Home warfarin dose: as updated on anticoagulation calendar per template     Missed doses: No     Medication changes:  No     S/S of bleeding or thromboembolism:  No     New Injury or illness:  No     Changes in diet or alcohol consumption:  No     Upcoming surgery, procedure or cardioversion:  No    Anticoagulation Episode Summary     Current INR goal:   2.0-3.0   TTR:   74.6 % (4.6 y)   Next INR check:   8/6/2019   INR from last check:   1.90! (7/23/2019)   Weekly max warfarin dose:      Target end date:      INR check location:      Preferred lab:      Send INR reminders to:   [facility]    Indications    Chronic atrial fibrillation (H) [I48.2]           Comments:            Anticoagulation Care Providers     Provider Role Specialty Phone number    Surya Delaney MD Referring Internal Medicine [phone]

## 2021-05-31 VITALS — BODY MASS INDEX: 26.35 KG/M2 | WEIGHT: 143.2 LBS | HEIGHT: 62 IN

## 2021-05-31 VITALS — HEIGHT: 62 IN | WEIGHT: 140.31 LBS | BODY MASS INDEX: 25.82 KG/M2

## 2021-05-31 VITALS — HEIGHT: 62 IN | WEIGHT: 137 LBS | BODY MASS INDEX: 25.21 KG/M2

## 2021-05-31 VITALS — WEIGHT: 135 LBS | HEIGHT: 62 IN | BODY MASS INDEX: 24.84 KG/M2

## 2021-05-31 VITALS — BODY MASS INDEX: 25.76 KG/M2 | WEIGHT: 140 LBS | HEIGHT: 62 IN

## 2021-05-31 VITALS — BODY MASS INDEX: 24.95 KG/M2 | WEIGHT: 135.6 LBS | HEIGHT: 62 IN

## 2021-05-31 NOTE — PATIENT INSTRUCTIONS - HE
1.  I will notify you of test results    2.  Bone density due after 11/13/19    3.  Continue Prolia for now    4.  Decrease losartan to 25 mg daily.  Goal  to 140 systolic.    5.  Pneumo-23 booster    6.  Screening mammogram as planned    7.  See in three months or as needed

## 2021-05-31 NOTE — TELEPHONE ENCOUNTER
ANTICOAGULATION  MANAGEMENT    Assessment     Today's INR result of 1.7 is Subtherapeutic (goal INR of 2.0-3.0)        Warfarin taken as previously instructed    No new diet changes affecting INR    No new medication/supplements affecting INR (wrote down meds but these are not new per patient)    Continues to tolerate warfarin with no reported s/s of bleeding or thromboembolism     Previous INR was Subtherapeutic    Plan:     Spoke with Helen regarding INR result and instructed:     Warfarin Dosing Instructions:  Change warfarin dose to 1.25 mg daily on Mon; and 2 mg daily rest of week  (12.8 % change)    Instructed patient to follow up no later than: two weeks    Education provided: importance of therapeutic range    Helen verbalizes understanding and agrees to warfarin dosing plan.    Instructed to call the [facility] for any changes, questions or concerns. (#[phone])   ?   Shantal Mdeina RN    Subjective/Objective:      Helen Beckham, a 88 y.o. female is on warfarin.     Helen reports:     Home warfarin dose: as updated on anticoagulation calendar per template     Missed doses: No     Medication changes:  No     S/S of bleeding or thromboembolism:  No     New Injury or illness:  No     Changes in diet or alcohol consumption:  No     Upcoming surgery, procedure or cardioversion:  No    Anticoagulation Episode Summary     Current INR goal:   2.0-3.0   TTR:   74.0 % (4.7 y)   Next INR check:   8/20/2019   INR from last check:   1.70! (8/6/2019)   Weekly max warfarin dose:      Target end date:      INR check location:      Preferred lab:      Send INR reminders to:   [facility]    Indications    Chronic atrial fibrillation (H) [I48.2]           Comments:            Anticoagulation Care Providers     Provider Role Specialty Phone number    Surya Delaney MD Referring Internal Medicine [phone]

## 2021-05-31 NOTE — TELEPHONE ENCOUNTER
ANTICOAGULATION  MANAGEMENT    Assessment     Today's INR result of 2.8 is Therapeutic (goal INR of 2.0-3.0)        Less warfarin taken than instructed which may be affecting INR took 1/2 mg less than plan, calendar adjusted    No new diet changes affecting INR    No new medication/supplements affecting INR    Continues to tolerate warfarin with no reported s/s of bleeding or thromboembolism     Previous INR was Therapeutic    Plan:     Spoke with Helen regarding INR result and instructed:     Warfarin Dosing Instructions:  Continue current warfarin dose 1.25 mg daily on sat/mon; and 2 mg daily rest of week      Instructed patient to follow up no later than: two weeks      Helen verbalizes understanding and agrees to warfarin dosing plan.    Instructed to call the [facility] for any changes, questions or concerns. (#[phone])   ?   Jack Curtis RN    Subjective/Objective:      Helen Beckham, a 88 y.o. female is on warfarin.     Helen reports:     Home warfarin dose: verbally confirmed home dose with Helen and updated on anticoagulation calendar     Missed doses: No     Medication changes:  No     S/S of bleeding or thromboembolism:  No     New Injury or illness:  No     Changes in diet or alcohol consumption:  No     Upcoming surgery, procedure or cardioversion:  No    Anticoagulation Episode Summary     Current INR goal:   2.0-3.0   TTR:   73.9 % (4.7 y)   Next INR check:   8/27/2019   INR from last check:   2.80 (8/13/2019)   Weekly max warfarin dose:      Target end date:      INR check location:      Preferred lab:      Send INR reminders to:   [facility]    Indications    Chronic atrial fibrillation (H) [I48.2]           Comments:            Anticoagulation Care Providers     Provider Role Specialty Phone number    Surya Delaney MD Referring Internal Medicine [phone]

## 2021-05-31 NOTE — TELEPHONE ENCOUNTER
"Prolia Injection Phone Screen      Screening questions have been asked 2-3 days prior to administration visit for Prolia. If any questions are answered with \"Yes,\" this phone encounter were will routed to ordering provider for further evaluation.     1.  When was the last injection?  02/19/19    2.  Has insurance for this injection been verified?  Yes    3.  Did you experience any new onset achiness or rashes that lasted for over a month with your previous Prolia injection?   No    4.  Do you have a fever over 101?F or a new deep cough that is unusual for you today? No    5.  Have you started any new medications in the last 6 months that you were told could affect your immune system? These may have been prescribed by oncologist, transplant, rheumatology, or dermatology.   No    6.  In the last 6 months have you have gastric bypass or parathyroid surgery?   No    7.  Do you plan dental work requiring drilling into the bone such as implants/extractions or oral surgery in the next 2-3 months?   No    8. Do you have new insurance since the last injection? No    Patient informed if symptoms discussed above present prior to their administration appointment, they are to notify clinic immediately.     Rufino Taylor            "

## 2021-05-31 NOTE — TELEPHONE ENCOUNTER
ANTICOAGULATION  MANAGEMENT    Assessment     Today's INR result of 3.6 is Supratherapeutic (goal INR of 2.0-3.0)        Warfarin taken as previously instructed    No new diet changes affecting INR    No new medication/supplements affecting INR finished 3 days of bactrim    Continues to tolerate warfarin with no reported s/s of bleeding or thromboembolism     Previous INR was Therapeutic    Plan:     Spoke with Helen regarding INR result and instructed:     Warfarin Dosing Instructions:  skip today then continue current warfarin dose 1.25 mg daily on mon/sat; and 2 mg daily rest of week     Instructed patient to follow up no later than: two weeks      Helen verbalizes understanding and agrees to warfarin dosing plan.    Instructed to call the [facility] for any changes, questions or concerns. (#[phone])   ?   Jack Curtis RN    Subjective/Objective:      Helen Beckham, a 88 y.o. female is on warfarin.     Helen reports:     Home warfarin dose: as updated on anticoagulation calendar per template     Missed doses: No     Medication changes:  No     S/S of bleeding or thromboembolism:  No     New Injury or illness:  No     Changes in diet or alcohol consumption:  No     Upcoming surgery, procedure or cardioversion:  No    Anticoagulation Episode Summary     Current INR goal:   2.0-3.0   TTR:   73.6 % (4.7 y)   Next INR check:   9/10/2019   INR from last check:   3.60! (8/27/2019)   Weekly max warfarin dose:      Target end date:      INR check location:      Preferred lab:      Send INR reminders to:   [facility]    Indications    Chronic atrial fibrillation (H) [I48.2]           Comments:            Anticoagulation Care Providers     Provider Role Specialty Phone number    Surya Delaney MD Referring Internal Medicine [phone]

## 2021-05-31 NOTE — PROGRESS NOTES
ASSESSMENT:  1. Mixed hyperlipidemia  Fasting lipid cascade will be checked.  She is currently not on a lipid-lowering agent  - Lipid Cascade    2. Osteopenia of multiple sites  She is on Prolia, and has tolerated it without adverse effects.  This will be continued.  Next bone density study is due after 11/13/2019  - DXA Bone Density Scan; Future    3. Essential hypertension  Blood pressure currently is at the low end of normal.  I would favor cutting losartan to 25 mg daily  - Urinalysis-UC if Indicated  - losartan (COZAAR) 25 MG tablet; Take 1 tablet (25 mg total) by mouth daily.  Dispense: 90 tablet; Refill: 3  - Comprehensive Metabolic Panel    4. Encounter for therapeutic drug monitoring  Monitoring labs will be checked  - Comprehensive Metabolic Panel    5. Need for pneumococcal vaccination  Pneumo-23 booster is advised  - Pneumococcal polysaccharide vaccine 23-valent greater than or equal to 3yo subcutaneous/IM    6.  Chronic low back pain  She reports injections have been of benefit.  He is only used 2 tramadol    7.  Chronic atrial fibrillation with pacemaker placement:  She reports that she has less energy after the pacer rate was cut from 80-70.  She currently appears well compensated    8.  History of diastolic congestive heart failure:  Echocardiogram revealed an ejection fraction of 55 to 60% in 2017.  She currently appears well compensated    PLAN:  Patient Instructions   1.  I will notify you of test results    2.  Bone density due after 11/13/19    3.  Continue Prolia for now    4.  Decrease losartan to 25 mg daily.  Goal  to 140 systolic.    5.  Pneumo-23 booster    6.  Screening mammogram as planned    7.  See in three months or as needed      Orders Placed This Encounter   Procedures     DXA Bone Density Scan     Standing Status:   Future     Standing Expiration Date:   8/13/2020     Order Specific Question:   Can the procedure be changed per Radiologist protocol?     Answer:   Yes      Urinalysis-UC if Indicated     Lipid Cascade     Order Specific Question:   Fasting is required?     Answer:   Unknown     Comprehensive Metabolic Panel     Medications Discontinued During This Encounter   Medication Reason     losartan (COZAAR) 50 MG tablet Dose adjustment       Return in about 3 months (around 11/13/2019) for Recheck.      ASSESSED PROBLEMS:  1. Need for pneumococcal vaccination  Pneumococcal polysaccharide vaccine 23-valent greater than or equal to 3yo subcutaneous/IM   2. Mixed hyperlipidemia  Lipid Cascade   3. Osteopenia of multiple sites  DXA Bone Density Scan   4. Essential hypertension  Urinalysis-UC if Indicated    losartan (COZAAR) 25 MG tablet    Comprehensive Metabolic Panel   5. Encounter for therapeutic drug monitoring  Comprehensive Metabolic Panel       CHIEF COMPLAINT:  Chief Complaint   Patient presents with     Follow-up      Routine pt fasting feel tried all the time     INR Check     Leg Pain     left leg is hurting for a week, feels like muscle pain       HISTORY OF PRESENT ILLNESS:  Helen is a 88 y.o. female presenting to the clinic today to follow up on back pain, osteoporosis and with complaints of leg pain and fatigue.    Leg Pain: Her left lower leg has been bothering her since last Wednesday night. She states she was just reaching for something in bed when it started to hurt, so she thinks she pulled something. It has been very sore since then but seems to be improving. The pain is more in the back of the calf.     Osteoporosis: She is wondering if she should continue to get Prolia injections. She has been getting them for a few years at this point. Her last DXA in November 2017 showed an increase in bone density. She is due for her next Prolia in about a week.     Fatigue: She has felt slower since having her pacemaker rate turned down to 70. She states it felt better when it was at 80. She does notice some palpitations at night. She gets short of breath easier than she  "used to.     Back Pain: Her back has been feeling much better since getting epidural steroid injections earlier this year. She has only needed to take the tramadol twice.     Hypertension: Her blood pressure has been in the low 100's systolic lately, and she would be interested in cutting back on her dose of losartan.      Neck Pain: Her neck continues to hurt. She uses a special pillow for the neck, which helps. She feels fine right now.     Health Maintenance: She is scheduled for a mammogram in September. She got both doses of the Shingrix vaccine. She will get a [id number] booster today.     REVIEW OF SYSTEMS:   She is fasting today. Her urine has an odor, so she would like a UA checked. She has had a bit of a cough. All other systems are negative.    PFSH:  She has been staying very busy. She went to the [employer] with her grandchildren the other day. They want to bring her to the fair now.     TOBACCO USE:  Social History     Tobacco Use   Smoking Status Never Smoker   Smokeless Tobacco Never Used       VITALS:  Vitals:    08/13/19 1232   BP: 102/60   Patient Site: Left Arm   Patient Position: Sitting   Cuff Size: Adult Regular   Pulse: 74   SpO2: 98%   Weight: 130 lb (59 kg)   Height: 5' 1.5\" (1.562 m)     Wt Readings from Last 3 Encounters:   08/13/19 130 lb (59 kg)   05/31/19 130 lb (59 kg)   03/12/19 125 lb (56.7 kg)     Body mass index is 24.17 kg/m .    PHYSICAL EXAM:  Constitutional:   Reveals an alert, talkative woman. Affect appropriate. Ambulates fairly easily with a single pronged cane.  Vitals: per nursing notes.  HEENT: Atraumatic.   Neck:  Supple, no carotid bruits or adenopathy.  Back:  Moderate kyphoscoliosis  Thorax: Pacer left upper chest.   Lungs: Clear to A&P without rales or wheezes.  Respiratory effort normal.  Cardiac:   Regular rate and rhythm, normal S1, S2, no murmur or gallop.  Abdomen:  Soft, active bowel sounds without bruits, mass, or tenderness.  Extremities:   No peripheral " edema noted. No ecchymoses or obvious muscle deformity of left lower leg.    Skin:  Clear.   Neuro:  No gross focal deficits. Detailed exam not done   Psychiatric:  Memory intact, mood appropriate.    ADDITIONAL HISTORY SUMMARIZED (2): None.  DECISION TO OBTAIN EXTRA INFORMATION (1): None.   RADIOLOGY TESTS (1): Reviewed 11/13/2017 DXA showing low bone density. DXA ordered.   LABS (1): Labs from February and March reviewed. Labs ordered.   MEDICINE TESTS (1): Reviewed 11/3/2017 echo - LVEF 55-60%  INDEPENDENT REVIEW (2 each): None.     The visit lasted a total of 22 minutes face to face with the patient. Over 50% of the time was spent counseling and educating the patient about her leg pain, osteoporosis, and fatigue.    IGareth, am scribing for and in the presence of, Dr. Delaney.    ISurya, personally performed the services described in this documentation, as scribed by Gareth Vaca in my presence, and it is both accurate and complete.    Dragon dictation was used for this note.  Speech recognition errors are a possibility.    MEDICATIONS:  Current Outpatient Medications   Medication Sig Dispense Refill     biotin 5,000 mcg TbDL Take by mouth.       cholecalciferol, vitamin D3, 1,000 unit tablet Take 1,000 Units by mouth daily.       clindamycin (CLEOCIN) 300 MG capsule Prior to dental appointments  0     COUMADIN 1 mg tablet Take 1 to 2 tablets (1 to 2 mg) by mouth daily. Adjust dose based on INR results as directed.(uses in conjunction with 2.5 mg tabs) 60 tablet 1     COUMADIN 2.5 mg tablet TAKE ONE-HALF TO ONE TABLET (1.25 MG TO 2.5 MG) BY MOUTH DAILY. ADJUST DOSE BASED ON INR RESULTS AS DIRECTED. 60 tablet 1     cyanocobalamin 1000 MCG tablet Take 1,000 mcg by mouth daily.       furosemide (LASIX) 20 MG tablet Take 1 tablet (20 mg total) by mouth daily. 90 tablet 3     metoprolol tartrate (LOPRESSOR) 25 MG tablet Take 1 tablet (25 mg total) by mouth 2 (two) times a day. 180 tablet 3      multivitamin with minerals (THERA-M) 9 mg iron-400 mcg Tab tablet Take 1 tablet by mouth daily.       pantoprazole (PROTONIX) 40 MG tablet Take 40 mg by mouth daily.       potassium chloride (KLOR-CON) 10 MEQ CR tablet Take 1 tablet (10 mEq total) by mouth daily. 90 tablet 3     traMADol (ULTRAM) 50 mg tablet Take 1 tablet (50 mg total) by mouth every 6 (six) hours as needed for pain. 30 tablet 0     losartan (COZAAR) 25 MG tablet Take 1 tablet (25 mg total) by mouth daily. 90 tablet 3     No current facility-administered medications for this visit.        Total data points: 3

## 2021-05-31 NOTE — TELEPHONE ENCOUNTER
Urine culture returned positive for E. coli.  She had complained of malodorous urine, which could represent infection.  A prescription for Bactrim DS 1 twice daily for 3 days will be sent to her pharmacy.  Please notify Helen of test results and plan.

## 2021-05-31 NOTE — TELEPHONE ENCOUNTER
Who is calling:  Patient  Reason for Call: Helen is calling for her INR result. She states that is was not a good result and is leaving her home soon so she would like to talk to ACN  Date of last appointment with primary care: n/a  Okay to leave a detailed message: Yes

## 2021-05-31 NOTE — TELEPHONE ENCOUNTER
ANTICOAGULATION  MANAGEMENT - BPA Interacting Medication Review    Interacting medication(s): Sulfamethoxazole-trimethoprim (Bactrim) with warfarin.    Duration: 3 days, 8/21 to 8/23    New medication?: Yes, interaction per Micromedex, may increase INR and risk of bleeding..    Plan:    No change to anticoagulation plan needed due to previously scheduled follow up is appropriate. Bactrim is only for 3 days. INR is already scheduled for 8/27.     ACN called and spoke with pt. Informed her of potential interaction between warfarin and Bactrim. Instructed her to monitor for s/sx of bleeding like usual. Continue with current warfarin dose. ACN encouraged her to eat a little more greens for a couple days. Recheck INR 8/27 as scheduled.     Torres Ziegler RN

## 2021-06-01 VITALS — BODY MASS INDEX: 24.92 KG/M2 | HEIGHT: 61 IN | WEIGHT: 132 LBS

## 2021-06-01 VITALS — BODY MASS INDEX: 23.98 KG/M2 | WEIGHT: 129 LBS

## 2021-06-01 VITALS — WEIGHT: 131 LBS | HEIGHT: 62 IN | BODY MASS INDEX: 24.11 KG/M2

## 2021-06-01 VITALS — HEIGHT: 61 IN | WEIGHT: 131.3 LBS | BODY MASS INDEX: 24.79 KG/M2

## 2021-06-01 NOTE — TELEPHONE ENCOUNTER
ANTICOAGULATION  MANAGEMENT    Assessment     Today's INR result of 2.7 is Therapeutic (goal INR of 2.0-3.0)        Warfarin taken differently than instructed, but no impact to total weekly dose    No new diet changes affecting INR    No new medication/supplements affecting INR    Continues to tolerate warfarin with no reported s/s of bleeding or thromboembolism     Previous INR was Therapeutic    Plan:     Spoke with Helen regarding INR result and instructed:     Warfarin Dosing Instructions:  Continue current warfarin dose 1.25 mg daily on Mon, Sat; and 2 mg daily rest of week  (0 % change)    Instructed patient to follow up no later than: 2 weeks, check at 10/11 OV    Education provided: target INR goal and significance of current INR result, importance of following up for INR monitoring at instructed interval, importance of taking warfarin as instructed and importance of notifying clinic for changes in medications    Helen verbalizes understanding and agrees to warfarin dosing plan.    Instructed to call the [facility] for any changes, questions or concerns. (#[phone])   ?   Katey Barriga RN    Subjective/Objective:      Helen Beckham, a 88 y.o. female is on warfarin.     Helen reports:     Home warfarin dose: verbally confirmed home dose with Helen and updated on anticoagulation calendar     Missed doses: No     Medication changes:  No     S/S of bleeding or thromboembolism:  No     New Injury or illness:  No     Changes in diet or alcohol consumption:  No     Upcoming surgery, procedure or cardioversion:  No    Anticoagulation Episode Summary     Current INR goal:   2.0-3.0   TTR:   66.6 %   Next INR check:   10/9/2019   INR from last check:   2.70 (9/25/2019)   Weekly max warfarin dose:      Target end date:      INR check location:      Preferred lab:      Send INR reminders to:   [facility]    Indications    Chronic atrial fibrillation (H) [I48.2]           Comments:             Anticoagulation Care Providers     Provider Role Specialty Phone number    Surya Delaney MD Referring Internal Medicine [phone]

## 2021-06-01 NOTE — TELEPHONE ENCOUNTER
ANTICOAGULATION  MANAGEMENT    Assessment     Today's INR result of 2.7 is Therapeutic (goal INR of 2.0-3.0)        Warfarin taken as previously instructed    No new diet changes affecting INR    No new medication/supplements affecting INR    Continues to tolerate warfarin with no reported s/s of bleeding or thromboembolism     Previous INR was Supratherapeutic    Plan:     Spoke with Helen regarding INR result and instructed:     Warfarin Dosing Instructions:  Continue current warfarin dose 1.25 mg daily on mon/sat; and 2 mg daily rest of week  (0 % change)    Instructed patient to follow up no later than: two weeks      Helen verbalizes understanding and agrees to warfarin dosing plan.    Instructed to call the [facility] for any changes, questions or concerns. (#[phone])   ?   Jack Curtis RN    Subjective/Objective:      Helen Beckham, a 88 y.o. female is on warfarin.     Helen reports:     Home warfarin dose: as updated on anticoagulation calendar per template     Missed doses: No     Medication changes:  No     S/S of bleeding or thromboembolism:  No     New Injury or illness:  No     Changes in diet or alcohol consumption:  No     Upcoming surgery, procedure or cardioversion:  No    Anticoagulation Episode Summary     Current INR goal:   2.0-3.0   TTR:   65.6 %   Next INR check:   9/25/2019   INR from last check:   2.70 (9/11/2019)   Weekly max warfarin dose:      Target end date:      INR check location:      Preferred lab:      Send INR reminders to:   [facility]    Indications    Chronic atrial fibrillation (H) [I48.2]           Comments:            Anticoagulation Care Providers     Provider Role Specialty Phone number    Surya Delaney MD Referring Internal Medicine [phone]

## 2021-06-02 ENCOUNTER — RECORDS - HEALTHEAST (OUTPATIENT)
Dept: ADMINISTRATIVE | Facility: CLINIC | Age: 86
End: 2021-06-02

## 2021-06-02 VITALS — BODY MASS INDEX: 23 KG/M2 | HEIGHT: 62 IN | WEIGHT: 125 LBS

## 2021-06-02 VITALS — BODY MASS INDEX: 23.79 KG/M2 | WEIGHT: 128 LBS

## 2021-06-02 NOTE — TELEPHONE ENCOUNTER
ANTICOAGULATION  MANAGEMENT    Assessment     Today's INR result of 2.1 is Therapeutic (goal INR of 2.0-3.0)        Warfarin taken as previously instructed    No new diet changes affecting INR    No new medication/supplements affecting INR    Continues to tolerate warfarin with no reported s/s of bleeding or thromboembolism     Previous INR was Therapeutic    Plan:     Spoke with Helen regarding INR result and instructed:     Warfarin Dosing Instructions:  Continue current warfarin dose 1.25 mg daily on mon/sat; and 2.5 mg daily rest of week  (0 % change)    Instructed patient to follow up no later than: two weeks which Helen prefers    Helen verbalizes understanding and agrees to warfarin dosing plan.    Instructed to call the [facility] for any changes, questions or concerns. (#[phone])   ?   Jack Curtis RN    Subjective/Objective:      Helen Beckham, a 88 y.o. female is on warfarin.     Helen reports:     Home warfarin dose: as updated on anticoagulation calendar per template     Missed doses: No     Medication changes:  No     S/S of bleeding or thromboembolism:  No     New Injury or illness:  No     Changes in diet or alcohol consumption:  No     Upcoming surgery, procedure or cardioversion:  No    Anticoagulation Episode Summary     Current INR goal:   2.0-3.0   TTR:   68.1 %   Next INR check:   10/25/2019   INR from last check:   2.10 (10/11/2019)   Weekly max warfarin dose:      Target end date:      INR check location:      Preferred lab:      Send INR reminders to:   [facility]    Indications    Chronic atrial fibrillation [I48.20]           Comments:            Anticoagulation Care Providers     Provider Role Specialty Phone number    Surya Delaney MD Referring Internal Medicine [phone]

## 2021-06-02 NOTE — TELEPHONE ENCOUNTER
ANTICOAGULATION  MANAGEMENT    Assessment     Today's INR result of 2.8 is Therapeutic (goal INR of 2.0-3.0)        Warfarin taken as previously instructed    No new diet changes affecting INR    No new medication/supplements affecting INR    Continues to tolerate warfarin with no reported s/s of bleeding or thromboembolism     Previous INR was Therapeutic    Plan:     Spoke with Helen regarding INR result and instructed:     Warfarin Dosing Instructions:  Continue current warfarin dose 1.25 mg daily on mon/sat; and 2 mg daily rest of week  (0 % change)    Instructed patient to follow up no later than: with ov 11/12      Helen verbalizes understanding and agrees to warfarin dosing plan.    Instructed to call the [facility] for any changes, questions or concerns. (#[phone])   ?   Jack Curtis RN    Subjective/Objective:      Helen Beckham, a 88 y.o. female is on warfarin.     Helen reports:     Home warfarin dose: as updated on anticoagulation calendar per template     Missed doses: No     Medication changes:  No     S/S of bleeding or thromboembolism:  No     New Injury or illness:  No     Changes in diet or alcohol consumption:  No     Upcoming surgery, procedure or cardioversion:  No    Anticoagulation Episode Summary     Current INR goal:   2.0-3.0   TTR:   68.1 %   Next INR check:   11/12/2019   INR from last check:   2.80 (10/23/2019)   Weekly max warfarin dose:      Target end date:      INR check location:      Preferred lab:      Send INR reminders to:   [facility]    Indications    Chronic atrial fibrillation [I48.20]           Comments:            Anticoagulation Care Providers     Provider Role Specialty Phone number    Surya Delaney MD Referring Internal Medicine [phone]

## 2021-06-02 NOTE — PATIENT INSTRUCTIONS - HE
1.  Right hip xray was done and reviewed    2.  Tylenol for pain.      3.  Try local heat, and topical treatments for pain    4.  Flu shot today    5. See in November as planned

## 2021-06-02 NOTE — PROGRESS NOTES
ASSESSMENT:  1. Chronic atrial fibrillation  Rate is under good control.  She will continue warfarin as an anticoagulant  - INR    2. Hip pain, right  Notes discomfort localized to the right inner thigh which is worse with walking.  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  I suspect pain is related to this.  Since she is on warfarin, she should not use NSAIDs.    3. Flu vaccine need  Influenza vaccine is advised  - Influenza High Dose,Seasonal,PF 65+ Yrs    4.  Shortness of breath with activity  She reports a sensation of diminished exercise tolerance when her pacer rate was cut down to 70.  She does not currently have evidence for fluid overload.  Echocardiogram from November 2017 revealed an ejection fraction of 55 to 60%.  I suspect some of her problem may be deconditioning    PLAN:  Patient Instructions   1.  Right hip xray was done and reviewed    2.  Tylenol for pain.      3.  Try local heat, and topical treatments for pain    4.  Flu shot today    5. See in November as planned        Orders Placed This Encounter   Procedures     Influenza High Dose,Seasonal,PF 65+ Yrs     There are no discontinued medications.    Return in about 2 months (around 12/11/2019) for Recheck.      ASSESSED PROBLEMS:  1. Hip pain, right  CANCELED: XR Pelvis W 2 Vw Hip Left   2. Chronic atrial fibrillation  INR   3. Flu vaccine need  Influenza High Dose,Seasonal,PF 65+ Yrs       CHIEF COMPLAINT:  Chief Complaint   Patient presents with     Leg Pain     right leg pain feels pain miroslava inter tight, pain while i am walking and feels pressure     Immunizations     flu shot     INR Check     2.1       HISTORY OF PRESENT ILLNESS:  Helen is a 88 y.o. female presenting to the clinic today regarding pain and pressure on the inside of her R leg/groin all the way down her leg. She states it is most noticeable when she is walking. She says she does a lot of walking on a daily basis, and using the stairs hurts most. Pt denies pain while sitting.  "She says she normally applies heat on her knee, which seems to help. She also applies BioFreeze to the area which seems to alleviate the pain as well. Pt also asked if she could start using a salonpas to treat the pain. She denies any effects when taking tylenol. Pt said she has tried tramadol previously, but could only take it at night.     Breathing: She states since she got pacemaker put to 70, she has had lowered energy and more difficulty breathing. She said immediately after they turned down the pacemaker, she went down the stairs and said she thought she was going to pass out. She said she also thinks the pacemaker may be sitting loose.     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   All other systems are negative.    PFSH:  Likes going to hockey games  Pt believes her mother had arthritis in her hands    TOBACCO USE:  Social History     Tobacco Use   Smoking Status Never Smoker   Smokeless Tobacco Never Used       VITALS:  Vitals:    10/11/19 1328   BP: 110/60   Patient Site: Left Arm   Patient Position: Sitting   Cuff Size: Adult Regular   Pulse: 72   SpO2: 98%   Weight: 129 lb (58.5 kg)   Height: 5' 1.5\" (1.562 m)     Wt Readings from Last 3 Encounters:   10/11/19 129 lb (58.5 kg)   08/13/19 130 lb (59 kg)   05/31/19 130 lb (59 kg)     Body mass index is 23.98 kg/m .    PHYSICAL EXAM:  Constitutional:   Reveals an alert, pleasant woman. Affect appropriate. Ambulates with a cane. Does not appear to be in major distress.  Vitals: per nursing notes.  HEENT: Atraumatic.   Neck:  Supple, no carotid bruits or adenopathy.  Back: Slight kyphoscoliosis. No tenderness to touch on spine.   Thorax: Pacer left upper chest   Lungs: Clear to A&P   Cardiac:  Irregularly irregular rhythm, rate 70's.   Abdomen:  Soft, active bowel sounds without bruits, mass, or tenderness.  Extremities:  Negative straight leg raise bilaterally. Mild discomfort with internal and external rotation of the right hip, no tenderness on " the left. Moderate crepitus to range of motion of the right knee. No joint effusion. No peripheral edema.    Skin: Clear.   Psychiatric:  Memory intact, mood appropriate.    ADDITIONAL HISTORY SUMMARIZED (2): None.  DECISION TO OBTAIN EXTRA INFORMATION (1): None.   RADIOLOGY TESTS (1): L Hip X-ray ordered.  LABS (1): None.   MEDICINE TESTS (1): Reviewed echo in 2017.  INDEPENDENT REVIEW (2 each): L hip X-ray ordered and interpreted - No fractures, moderate degenerative change right hip joint, spurring of the glenohumeral area, prominent degenerative changes lower lumbar spine.     The visit lasted a total of 18 minutes face to face with the patient. Over 50% of the time was spent counseling and educating the patient about groin pain.    IRadha, am scribing for and in the presence of, Dr. Delaney.    Surya CENTENO, personally performed the services described in this documentation, as scribed by Radha Lerner in my presence, and it is both accurate and complete.    Dragon dictation was used for this note.  Speech recognition errors are a possibility.      MEDICATIONS:  Current Outpatient Medications   Medication Sig Dispense Refill     biotin 5,000 mcg TbDL Take by mouth.       cholecalciferol, vitamin D3, 1,000 unit tablet Take 1,000 Units by mouth daily.       clindamycin (CLEOCIN) 300 MG capsule Prior to dental appointments  0     COUMADIN 1 mg tablet Take 1 to 2 tablets (1 to 2 mg) by mouth daily. Adjust dose based on INR results as directed.(uses in conjunction with 2.5 mg tabs) 60 tablet 1     COUMADIN 2.5 mg tablet TAKE ONE-HALF TO ONE TABLET (1.25 MG TO 2.5 MG) BY MOUTH DAILY. ADJUST DOSE BASED ON INR RESULTS AS DIRECTED. 60 tablet 1     cyanocobalamin 1000 MCG tablet Take 1,000 mcg by mouth daily.       furosemide (LASIX) 20 MG tablet Take 1 tablet (20 mg total) by mouth daily. 90 tablet 3     losartan (COZAAR) 25 MG tablet Take 1 tablet (25 mg total) by mouth daily. 90 tablet 3     metoprolol  tartrate (LOPRESSOR) 25 MG tablet Take 1 tablet (25 mg total) by mouth 2 (two) times a day. 180 tablet 3     multivitamin with minerals (THERA-M) 9 mg iron-400 mcg Tab tablet Take 1 tablet by mouth daily.       pantoprazole (PROTONIX) 40 MG tablet Take 40 mg by mouth daily.       potassium chloride (KLOR-CON) 10 MEQ CR tablet Take 1 tablet (10 mEq total) by mouth daily. 90 tablet 3     traMADol (ULTRAM) 50 mg tablet Take 1 tablet (50 mg total) by mouth every 6 (six) hours as needed for pain. 30 tablet 0     Current Facility-Administered Medications   Medication Dose Route Frequency Provider Last Rate Last Dose     denosumab 60 mg (PROLIA 60 mg/ml)  60 mg Subcutaneous Q6 Months Eugene Lopez, PharmD   60 mg at 08/27/19 1117       Total data points: 4

## 2021-06-03 VITALS — WEIGHT: 130 LBS | BODY MASS INDEX: 23.92 KG/M2 | HEIGHT: 62 IN

## 2021-06-03 VITALS
WEIGHT: 129 LBS | HEART RATE: 72 BPM | BODY MASS INDEX: 23.74 KG/M2 | HEIGHT: 62 IN | SYSTOLIC BLOOD PRESSURE: 110 MMHG | DIASTOLIC BLOOD PRESSURE: 60 MMHG | OXYGEN SATURATION: 98 %

## 2021-06-03 VITALS — HEIGHT: 62 IN | BODY MASS INDEX: 23.92 KG/M2 | WEIGHT: 130 LBS

## 2021-06-03 VITALS
HEART RATE: 70 BPM | BODY MASS INDEX: 24.66 KG/M2 | DIASTOLIC BLOOD PRESSURE: 66 MMHG | OXYGEN SATURATION: 97 % | HEIGHT: 62 IN | WEIGHT: 134 LBS | SYSTOLIC BLOOD PRESSURE: 138 MMHG

## 2021-06-03 NOTE — PROGRESS NOTES
I met with Helen Beckham at the request of pcp to recheck her blood pressure.  Blood pressure medications on the MAR were reviewed with patient.    Patient has taken all medications as per usual regimen: Yes  Patient reports tolerating them without any issues or concerns: Yes    Vitals:    11/26/19 1243 11/26/19 1248   BP: 136/70 126/60   Patient Site: Left Arm Left Arm   Patient Position: Sitting Sitting   Cuff Size: Adult Regular Adult Regular   Pulse: 77    SpO2: 98%        Blood pressure was taken, previous encounter was reviewed, recorded blood pressure below 140/90.  Patient was discharged and the note will be sent to the provider for final review.

## 2021-06-03 NOTE — TELEPHONE ENCOUNTER
ANTICOAGULATION  MANAGEMENT    Assessment     Today's INR result of 1.8 is Subtherapeutic (goal INR of 2.0-3.0)        Warfarin taken as previously instructed denies missed dose    No new diet changes affecting INR    No new medication/supplements affecting INR    Continues to tolerate warfarin with no reported s/s of bleeding or thromboembolism     Previous INR was Therapeutic    Plan:     Spoke with Helen regarding INR result and instructed:     Warfarin Dosing Instructions:  3 mg tonight then continue current warfarin dose 1.25 mg daily on mon/sat; and 2 mg daily rest of week  (0 % change)    Instructed patient to follow up no later than: two weeks      Helen verbalizes understanding and agrees to warfarin dosing plan.    Instructed to call the [facility] for any changes, questions or concerns. (#[phone])   ?   Jack Curtis RN    Subjective/Objective:      Helen Beckham, a 88 y.o. female is on warfarin.     Helen reports:     Home warfarin dose: verbally confirmed home dose with Helen and updated on anticoagulation calendar     Missed doses: No     Medication changes:  No     S/S of bleeding or thromboembolism:  No     New Injury or illness:  No     Changes in diet or alcohol consumption:  No     Upcoming surgery, procedure or cardioversion:  No    Anticoagulation Episode Summary     Current INR goal:   2.0-3.0   TTR:   67.6 % (1 y)   Next INR check:   12/10/2019   INR from last check:   1.80! (11/26/2019)   Weekly max warfarin dose:      Target end date:      INR check location:      Preferred lab:      Send INR reminders to:   [facility]NA[facility]    Indications    Chronic atrial fibrillation [I48.20]           Comments:            Anticoagulation Care Providers     Provider Role Specialty Phone number    Surya Delaney MD Referring Internal Medicine [phone]

## 2021-06-03 NOTE — PATIENT INSTRUCTIONS - HE
!  Use tylenol for pain.  Could take up to four times daily    2.  Same medications    3.  Check INR today    4.  Recheck bone density as planned    5.  See in three months or as needed

## 2021-06-03 NOTE — PROGRESS NOTES
ASSESSMENT:  1. Essential hypertension  Pressure is under good control  - losartan (COZAAR) 25 MG tablet; Take 1 tablet (25 mg total) by mouth daily.  Dispense: 90 tablet; Refill: 3  -Metoprolol tartrate 25 mg twice daily  2. Chronic atrial fibrillation  She is on warfarin as an anticoagulant and on low-dose metoprolol.  She has not noted any symptomatic palpitation  - INR    3.  Osteoporosis  Treated with Prolia.  Bone density study is scheduled for several days    4.  Shortness of breath with exertion:  She has a diagnosis of chronic diastolic heart failure, but currently seems well compensated with no peripheral edema.      PLAN:  Patient Instructions   !  Use tylenol for pain.  Could take up to four times daily    2.  Same medications    3.  Check INR today    4.  Recheck bone density as planned    5.  See in three months or as needed        Medications Discontinued During This Encounter   Medication Reason     losartan (COZAAR) 25 MG tablet Reorder       Return in about 3 months (around 2/12/2020) for Recheck.      ASSESSED PROBLEMS:  1. Essential hypertension  losartan (COZAAR) 25 MG tablet   2. Chronic atrial fibrillation  INR       CHIEF COMPLAINT:  Chief Complaint   Patient presents with     COPD     3 months     INR Check       HISTORY OF PRESENT ILLNESS:  Helen is a 88 y.o. female presenting to the clinic today to follow up on her pain, edema, and fatigue.     Leg Pain: Her right hip/leg is feeling a little better, but it still hurts. She has been using Biofreeze and other over the counter treatments that people have recommended to her. It seems to be improving slowly. She has been trying not to take tramadol lately but inquires if it is okay to take at night if needed. She also started taking Tylenol recently.     Pacemaker In Situ: Her pacemaker seems to be working well, but she is concerned the rate is too slow. She gets more short of breath with walking now. She is scheduled to see cardiology in June  "but will have a device check before then.     Edema: She forgot to take her furosemide yesterday but did take it today. She has not had much extremity swelling, but her right leg tends to be worse than the left when she does.     Fatigue: She has been very fatigued lately and is sleeping a lot. She thinks she has been slowed down since her pacemaker was adjusted down to 70.     GERD: Her stomach has felt okay. She tried going without protonix for a while but realized she does need it to prevent heartburn.     Atrial Fibrillation: She continues to anticoagulate on Coumadin and is due for an INR today.     Osteoporosis: She is scheduled for a DXA later this week.     REVIEW OF SYSTEMS:   She wheezes a little, but her breathing has otherwise been good.She notices that her hands gets stiff and lock up on her when she is doing things such as reading a book. She needs to warm them up before she starts reading again. All other systems are negative.    PFSH:  She has only gone to two [place] [employer] games so far this year but has sold other tickets. She recently got a few new windows in her house. She walks in her house a lot.     TOBACCO USE:  Social History     Tobacco Use   Smoking Status Never Smoker   Smokeless Tobacco Never Used       VITALS:  Vitals:    11/12/19 1242   BP: 138/66   Patient Site: Left Arm   Patient Position: Sitting   Cuff Size: Adult Regular   Pulse: 70   SpO2: 97%   Weight: 134 lb (60.8 kg)   Height: 5' 1.5\" (1.562 m)     Wt Readings from Last 3 Encounters:   11/12/19 134 lb (60.8 kg)   10/11/19 129 lb (58.5 kg)   08/13/19 130 lb (59 kg)     Body mass index is 24.91 kg/m .    PHYSICAL EXAM:  Constitutional:   Reveals an alert, talkative woman with an appropriate affect. Does not appear acutely ill.  Vitals: per nursing notes.  HEENT:  Atraumatic.  Neck:  Supple, no carotid bruits or adenopathy.  Back: Minor scoliosis.   Thorax: Pacer left upper chest   Lungs: Clear to A&P   Cardiac:   Regular rate and " rhythm, normal S1, S2, soft systolic murmur left sternal border.   Abdomen:  Soft, active bowel sounds without bruits, mass, or tenderness.  Extremities:  Trace edema right ankle, no significant edema on the left.     Skin: No abnormal pallor.   Neuro:   No gross focal deficits. Detailed exam not done  Psychiatric:  Memory intact, mood appropriate.    ADDITIONAL HISTORY SUMMARIZED (2): None.  DECISION TO OBTAIN EXTRA INFORMATION (1): None.   RADIOLOGY TESTS (1): None.  LABS (1): Labs from 8/13/2019 reviewed; CMP, Lipid, UA  MEDICINE TESTS (1): Reviewed 2017 echo  INDEPENDENT REVIEW (2 each): None.     The visit lasted a total of 18 minutes face to face with the patient. Over 50% of the time was spent counseling and educating the patient about her pain and medications.    IGareth, am scribing for and in the presence of, Dr. Delaney.    ISurya, personally performed the services described in this documentation, as scribed by Gareth Vaca in my presence, and it is both accurate and complete.    Dragon dictation was used for this note.  Speech recognition errors are a possibility.    MEDICATIONS:  Current Outpatient Medications   Medication Sig Dispense Refill     biotin 5,000 mcg TbDL Take by mouth.       cholecalciferol, vitamin D3, 1,000 unit tablet Take 1,000 Units by mouth daily.       clindamycin (CLEOCIN) 300 MG capsule Prior to dental appointments  0     COUMADIN 1 mg tablet Take 1 to 2 tablets (1 to 2 mg) by mouth daily. Adjust dose based on INR results as directed.(uses in conjunction with 2.5 mg tabs) 180 tablet 1     COUMADIN 2.5 mg tablet TAKE ONE-HALF TO ONE TABLET (1.25 MG TO 2.5 MG) BY MOUTH DAILY. ADJUST DOSE BASED ON INR RESULTS AS DIRECTED. 60 tablet 1     cyanocobalamin 1000 MCG tablet Take 1,000 mcg by mouth daily.       furosemide (LASIX) 20 MG tablet Take 1 tablet (20 mg total) by mouth daily. 90 tablet 3     losartan (COZAAR) 25 MG tablet Take 1 tablet (25 mg total) by mouth  daily. 90 tablet 3     metoprolol tartrate (LOPRESSOR) 25 MG tablet Take 1 tablet (25 mg total) by mouth 2 (two) times a day. 180 tablet 3     multivitamin with minerals (THERA-M) 9 mg iron-400 mcg Tab tablet Take 1 tablet by mouth daily.       pantoprazole (PROTONIX) 40 MG tablet Take 40 mg by mouth daily.       potassium chloride (KLOR-CON) 10 MEQ CR tablet Take 1 tablet (10 mEq total) by mouth daily. 90 tablet 3     traMADol (ULTRAM) 50 mg tablet Take 1 tablet (50 mg total) by mouth every 6 (six) hours as needed for pain. 30 tablet 0     Current Facility-Administered Medications   Medication Dose Route Frequency Provider Last Rate Last Dose     denosumab 60 mg (PROLIA 60 mg/ml)  60 mg Subcutaneous Q6 Months Eugene Lopez, PharmD   60 mg at 08/27/19 1117       Total data points: 2

## 2021-06-03 NOTE — TELEPHONE ENCOUNTER
"Went down stair and relay pcp note to patient. \"Pressure is under good control  - losartan (COZAAR) 25 MG tablet; Take 1 tablet (25 mg total) by mouth daily.  Dispense: 90 tablet; Refill: 3  -Metoprolol tartrate 25 mg twice daily\" Advised pt to take Losartan 25 mg and give as the update in two weeks. Pt agreed with the plan.  "

## 2021-06-03 NOTE — TELEPHONE ENCOUNTER
ANTICOAGULATION  MANAGEMENT    Assessment     Today's INR result of 2.9 is Therapeutic (goal INR of 2.0-3.0)        Warfarin taken as previously instructed    Decreased greens/vitamin K intake may be affecting INR will reinstate    No new medication/supplements affecting INR    Continues to tolerate warfarin with no reported s/s of bleeding or thromboembolism     Previous INR was Therapeutic    Plan:     Spoke with Helen regarding INR result and instructed:     Warfarin Dosing Instructions:  Continue current warfarin dose 1.25 mg daily on mon/sat; and 2.5 mg daily rest of week  (0 % change)    Instructed patient to follow up no later than: two weeks      Helen verbalizes understanding and agrees to warfarin dosing plan.    Instructed to call the [facility] for any changes, questions or concerns. (#[phone])   ?   Jack Curtis RN    Subjective/Objective:      Helen Beckham, a 88 y.o. female is on warfarin.     Helen reports:     Home warfarin dose: as updated on anticoagulation calendar per template     Missed doses: No     Medication changes:  No     S/S of bleeding or thromboembolism:  No     New Injury or illness:  No     Changes in diet or alcohol consumption:  No     Upcoming surgery, procedure or cardioversion:  No    Anticoagulation Episode Summary     Current INR goal:   2.0-3.0   TTR:   68.1 %   Next INR check:   11/26/2019   INR from last check:   2.90 (11/12/2019)   Weekly max warfarin dose:      Target end date:      INR check location:      Preferred lab:      Send INR reminders to:   [facility]NA[facility]    Indications    Chronic atrial fibrillation [I48.20]           Comments:            Anticoagulation Care Providers     Provider Role Specialty Phone number    Surya Delaney MD Referring Internal Medicine [phone]

## 2021-06-03 NOTE — TELEPHONE ENCOUNTER
Pt came in with a Rx bottle of Losartin 50mg. She's been taking this but now her prescription is 25mg and she doesn't know why.  She doesn't know if she should continue with 50 mg start taking the 25 mg.  She is getting a bone density now, can you respond back to me in the next 1/2 hour?

## 2021-06-04 VITALS
DIASTOLIC BLOOD PRESSURE: 60 MMHG | BODY MASS INDEX: 24.92 KG/M2 | SYSTOLIC BLOOD PRESSURE: 102 MMHG | HEIGHT: 61 IN | WEIGHT: 132 LBS | HEART RATE: 68 BPM | OXYGEN SATURATION: 68 %

## 2021-06-04 VITALS
DIASTOLIC BLOOD PRESSURE: 58 MMHG | HEART RATE: 75 BPM | WEIGHT: 130 LBS | BODY MASS INDEX: 24.86 KG/M2 | SYSTOLIC BLOOD PRESSURE: 133 MMHG

## 2021-06-04 VITALS
HEIGHT: 61 IN | OXYGEN SATURATION: 97 % | WEIGHT: 135 LBS | SYSTOLIC BLOOD PRESSURE: 120 MMHG | BODY MASS INDEX: 25.49 KG/M2 | DIASTOLIC BLOOD PRESSURE: 80 MMHG | HEART RATE: 81 BPM

## 2021-06-04 NOTE — TELEPHONE ENCOUNTER
ANTICOAGULATION  MANAGEMENT    Assessment     Today's INR result of 2.3 is Therapeutic (goal INR of 2.0-3.0)        Warfarin taken as previously instructed    No new diet changes affecting INR    No new medication/supplements affecting INR    Continues to tolerate warfarin with no reported s/s of bleeding or thromboembolism     Previous INR was Therapeutic    Plan:     Spoke with Helen regarding INR result and instructed:     Warfarin Dosing Instructions:  Continue current warfarin dose 1.25 mg daily on mon/sat; and 2 mg daily rest of week  (0 % change)    Instructed patient to follow up no later than: 1-2 weeks      Helen verbalizes understanding and agrees to warfarin dosing plan.    Instructed to call the [facility] for any changes, questions or concerns. (#[phone])   ?   Jack Curtis RN    Subjective/Objective:      Helen Beckham, a 89 y.o. female is on warfarin.     Helen reports:     Home warfarin dose: as updated on anticoagulation calendar per template     Missed doses: No     Medication changes:  No     S/S of bleeding or thromboembolism:  No     New Injury or illness:  No     Changes in diet or alcohol consumption:  No     Upcoming surgery, procedure or cardioversion:  No    Anticoagulation Episode Summary     Current INR goal:   2.0-3.0   TTR:   67.3 % (1 y)   Next INR check:   12/24/2019   INR from last check:   2.30 (12/10/2019)   Weekly max warfarin dose:      Target end date:      INR check location:      Preferred lab:      Send INR reminders to:   [facility]    Indications    Chronic atrial fibrillation [I48.20]           Comments:            Anticoagulation Care Providers     Provider Role Specialty Phone number    Surya Delaney MD Referring Internal Medicine [phone]

## 2021-06-04 NOTE — TELEPHONE ENCOUNTER
ANTICOAGULATION  MANAGEMENT    Assessment     Today's INR result of 2.4 is Therapeutic (goal INR of 2.0-3.0)        Warfarin taken as previously instructed    No new diet changes affecting INR    No new medication/supplements affecting INR    Continues to tolerate warfarin with no reported s/s of bleeding or thromboembolism     Previous INR was Therapeutic    Plan:     Spoke with Helen regarding INR result and instructed:     Warfarin Dosing Instructions:  Continue current warfarin dose 1.25 mg daily on mon/sat; and 2 mg daily rest of week  (0 % change)    Instructed patient to follow up no later than: 2-4 weeks        Helen verbalizes understanding and agrees to warfarin dosing plan.    Instructed to call the [facility] for any changes, questions or concerns. (#[phone])   ?   Jack Curtis RN    Subjective/Objective:      Helen Beckham, a 89 y.o. female is on warfarin.     Helen reports:     Home warfarin dose: as updated on anticoagulation calendar per template     Missed doses: No     Medication changes:  No     S/S of bleeding or thromboembolism:  No     New Injury or illness:  No     Changes in diet or alcohol consumption:  No     Upcoming surgery, procedure or cardioversion:  No    Anticoagulation Episode Summary     Current INR goal:   2.0-3.0   TTR:   67.3 % (1 y)   Next INR check:   1/20/2020   INR from last check:   2.40 (12/23/2019)   Weekly max warfarin dose:      Target end date:      INR check location:      Preferred lab:      Send INR reminders to:   [facility]NA[facility]    Indications    Chronic atrial fibrillation [I48.20]           Comments:            Anticoagulation Care Providers     Provider Role Specialty Phone number    Surya Delaney MD Referring Internal Medicine [phone]

## 2021-06-04 NOTE — TELEPHONE ENCOUNTER
FYI - Status Update  Who is Calling: Patient  Update: States she is returning AC[facility] Jack's call, please call again soon as patient will be leaving soon.  Okay to leave a detailed message?:  Yes

## 2021-06-05 VITALS
WEIGHT: 133 LBS | OXYGEN SATURATION: 96 % | SYSTOLIC BLOOD PRESSURE: 128 MMHG | BODY MASS INDEX: 25.55 KG/M2 | DIASTOLIC BLOOD PRESSURE: 62 MMHG | HEART RATE: 73 BPM

## 2021-06-05 VITALS
BODY MASS INDEX: 26.34 KG/M2 | WEIGHT: 139.5 LBS | SYSTOLIC BLOOD PRESSURE: 120 MMHG | RESPIRATION RATE: 18 BRPM | OXYGEN SATURATION: 98 % | DIASTOLIC BLOOD PRESSURE: 64 MMHG | HEART RATE: 70 BPM | HEIGHT: 61 IN

## 2021-06-05 VITALS
HEART RATE: 86 BPM | SYSTOLIC BLOOD PRESSURE: 122 MMHG | HEIGHT: 61 IN | BODY MASS INDEX: 25.68 KG/M2 | WEIGHT: 136 LBS | OXYGEN SATURATION: 99 % | DIASTOLIC BLOOD PRESSURE: 84 MMHG

## 2021-06-05 VITALS
WEIGHT: 132 LBS | HEART RATE: 88 BPM | SYSTOLIC BLOOD PRESSURE: 124 MMHG | DIASTOLIC BLOOD PRESSURE: 72 MMHG | BODY MASS INDEX: 25.36 KG/M2 | OXYGEN SATURATION: 98 %

## 2021-06-05 NOTE — TELEPHONE ENCOUNTER
ANTICOAGULATION  MANAGEMENT    Assessment     Today's INR result of 2.9 is Therapeutic (goal INR of 2.0-3.0)        Warfarin taken as previously instructed    No new diet changes affecting INR    No new medication/supplements affecting INR    Continues to tolerate warfarin with no reported s/s of bleeding or thromboembolism     Previous INR was Therapeutic    Plan:     Spoke with Helen regarding INR result and instructed:     Warfarin Dosing Instructions:  Continue current warfarin dose 1.25 mg daily on sat; and 2 mg daily rest of week  (0 % change)    Instructed patient to follow up no later than: two weeks with ov      Helen verbalizes understanding and agrees to warfarin dosing plan.    Instructed to call the [facility] for any changes, questions or concerns. (#[phone])   ?   Jack Curtis RN    Subjective/Objective:      Helen Beckham, a 89 y.o. female is on warfarin.     Helen reports:     Home warfarin dose: as updated on anticoagulation calendar per template     Missed doses: No     Medication changes:  No     S/S of bleeding or thromboembolism:  No     New Injury or illness:  No     Changes in diet or alcohol consumption:  No     Upcoming surgery, procedure or cardioversion:  No    Anticoagulation Episode Summary     Current INR goal:   2.0-3.0   TTR:   68.9 % (1 y)   Next INR check:   2/11/2020   INR from last check:   2.90 (1/27/2020)   Weekly max warfarin dose:      Target end date:      INR check location:      Preferred lab:      Send INR reminders to:   [facility]NA[facility]    Indications    Chronic atrial fibrillation [I48.20]           Comments:            Anticoagulation Care Providers     Provider Role Specialty Phone number    Surya Delaney MD Referring Internal Medicine [phone]

## 2021-06-05 NOTE — TELEPHONE ENCOUNTER
ANTICOAGULATION  MANAGEMENT    Assessment     Today's INR result of 2.8 is Therapeutic (goal INR of 2.0-3.0)        Warfarin taken as previously instructed    No new diet changes affecting INR    No new medication/supplements affecting INR    Continues to tolerate warfarin with no reported s/s of bleeding or thromboembolism     Previous INR was Therapeutic    Plan:     Spoke with Helen regarding INR result and instructed:     Warfarin Dosing Instructions:  Continue current warfarin dose 1.25 mg daily on mon/sat; and 2 mg daily rest of week  (0 % change)    Instructed patient to follow up no later than: 3 weeks      Helen verbalizes understanding and agrees to warfarin dosing plan.    Instructed to call the [facility] for any changes, questions or concerns. (#[phone])   ?   Jack Curtis RN    Subjective/Objective:      Helen Beckham, a 89 y.o. female is on warfarin.     Helen reports:     Home warfarin dose: as updated on anticoagulation calendar per template     Missed doses: No     Medication changes:  No     S/S of bleeding or thromboembolism:  No     New Injury or illness:  No     Changes in diet or alcohol consumption:  No     Upcoming surgery, procedure or cardioversion:  No    Anticoagulation Episode Summary     Current INR goal:   2.0-3.0   TTR:   67.3 % (1 y)   Next INR check:   1/27/2020   INR from last check:   2.80 (1/6/2020)   Weekly max warfarin dose:      Target end date:      INR check location:      Preferred lab:      Send INR reminders to:   F[facility]NA[facility]    Indications    Chronic atrial fibrillation [I48.20]           Comments:            Anticoagulation Care Providers     Provider Role Specialty Phone number    Surya Delaney MD Referring Internal Medicine [phone]

## 2021-06-06 NOTE — TELEPHONE ENCOUNTER
Anticoagulation    Helen Beckham, 89 y.o., female    Chart reviewed for evaluation of next INR follow up date to limit exposure to health care setting during COVID-19 concern.    Lab Results   Component Value Date    INR 2.90 (H) 02/24/2020    INR 2.90 (H) 02/11/2020    INR 2.70 (H) 01/27/2020    INR 2.90 (!) 01/27/2020    INR 2.80 (H) 01/06/2020    INR 2.40 (H) 12/23/2019    INR 2.30 (H) 12/10/2019    INR 1.80 (H) 11/26/2019    INR 2.90 (H) 11/12/2019    INR 2.80 (H) 10/23/2019    INR 2.10 (H) 10/11/2019    INR 2.70 (H) 09/25/2019    INR 1.80 (H) 11/05/2014         Assessment/plan:    Last out of range INR 11/26/19.  Stable dose even prior    Agree with delaying until end of month. Anticoagulation calendar updated    Kasie Adams, PharmD  Anticoagulation clinic

## 2021-06-06 NOTE — TELEPHONE ENCOUNTER
Refill Approved    Rx renewed per Medication Renewal Policy. Medication was last renewed on 3/8/19  #90 R-3.    Last OV 2/11/20    Anjelica Del Rosario, [facility] Triage/Med Refill 2/20/2020     Requested Prescriptions   Pending Prescriptions Disp Refills     KLOR-CON M10 10 mEq tablet [Pharmacy Med Name: Klor-Con M10 Oral Tablet Extended Release 10 MEQ] 30 tablet 2     Sig: Take 1 tablet (10 mEq total) by mouth daily.       Potassium Supplements Refill Protocol Passed - 2/17/2020  7:00 AM        Passed - PCP or prescribing provider visit in past 12 months       Last office visit with prescriber/PCP: 2/11/2020 Surya Delaney MD OR same dept: 2/11/2020 Surya Delaney MD OR same specialty: 2/11/2020 Surya Delaney MD  Last physical: Visit date not found Last MTM visit: Visit date not found   Next visit within 3 mo: Visit date not found  Next physical within 3 mo: Visit date not found  Prescriber OR PCP: Surya Delaney MD  Last diagnosis associated with med order: There are no diagnoses linked to this encounter.  If protocol passes may refill for 12 months if within 3 months of last provider visit (or a total of 15 months).             Passed - Potassium level in last 12 months     Lab Results   Component Value Date    Potassium 4.2 02/11/2020

## 2021-06-06 NOTE — TELEPHONE ENCOUNTER
ANTICOAGULATION  MANAGEMENT    Assessment     Today's INR result of 2.9 is Therapeutic (goal INR of 2.0-3.0)        Warfarin taken as previously instructed    No new diet changes affecting INR    No new medication/supplements affecting INR    Continues to tolerate warfarin with no reported s/s of bleeding or thromboembolism     Previous INR was Therapeutic    Plan:     Spoke with Helen regarding INR result and instructed:     Warfarin Dosing Instructions:  Continue current warfarin dose 1.25 mg daily on sat; and 2 mg daily rest of week      Instructed patient to follow up no later than: 2-3 weeks      Helen verbalizes understanding and agrees to warfarin dosing plan.    Instructed to call the [facility] for any changes, questions or concerns. (#[phone])   ?   Jack Curtis RN    Subjective/Objective:      Helen Beckham, a 89 y.o. female is on warfarin.     Helen reports:     Home warfarin dose: as updated on anticoagulation calendar per template     Missed doses: No     Medication changes:  No     S/S of bleeding or thromboembolism:  No     New Injury or illness:  No     Changes in diet or alcohol consumption:  No     Upcoming surgery, procedure or cardioversion:  No    Anticoagulation Episode Summary     Current INR goal:   2.0-3.0   TTR:   71.9 % (1 y)   Next INR check:   3/16/2020   INR from last check:   2.90 (2/24/2020)   Weekly max warfarin dose:      Target end date:      INR check location:      Preferred lab:      Send INR reminders to:   [facility]NA[facility]    Indications    Chronic atrial fibrillation [I48.20]           Comments:            Anticoagulation Care Providers     Provider Role Specialty Phone number    Surya Delaney MD Referring Internal Medicine [phone]

## 2021-06-06 NOTE — TELEPHONE ENCOUNTER
ANTICOAGULATION  MANAGEMENT    Assessment     Today's INR result of 2.9 is Therapeutic (goal INR of 2.0-3.0)        Warfarin taken as previously instructed    No new diet changes affecting INR    No new medication/supplements affecting INR    Continues to tolerate warfarin with no reported s/s of bleeding or thromboembolism     Previous INR was Therapeutic    Plan:     Left a detailed message for Helen regarding INR result and instructed:     Warfarin Dosing Instructions:  Continue current warfarin dose 1.25 mg daily on sat; and 2 mg daily rest of week  (0 % change)    Instructed patient to follow up no later than: 2-4 weeks    .    Instructed to call the [facility] for any changes, questions or concerns. (#[phone])   ?   Jack Curtis RN    Subjective/Objective:      Helen Beckham, a 89 y.o. female is on warfarin.     Helen reports:     Home warfarin dose: as updated on anticoagulation calendar per template     Missed doses: No     Medication changes:  No     S/S of bleeding or thromboembolism:  No     New Injury or illness:  No     Changes in diet or alcohol consumption:  No     Upcoming surgery, procedure or cardioversion:  No    Anticoagulation Episode Summary     Current INR goal:   2.0-3.0   TTR:   69.0 % (1 y)   Next INR check:   3/10/2020   INR from last check:   2.90 (2/11/2020)   Weekly max warfarin dose:      Target end date:      INR check location:      Preferred lab:      Send INR reminders to:   [facility]NA[facility]    Indications    Chronic atrial fibrillation [I48.20]           Comments:            Anticoagulation Care Providers     Provider Role Specialty Phone number    Surya Delaney MD Referring Internal Medicine [phone]

## 2021-06-06 NOTE — TELEPHONE ENCOUNTER
Who is calling:  Patient  Reason for Call:  Patient wants to know if she should keep her INR for tomorrow  Date of last appointment with primary care: n/a  Okay to leave a detailed message: Yes

## 2021-06-07 NOTE — TELEPHONE ENCOUNTER
Who is calling:  Patient  Reason for Call:  Patient called to inform AC[facility] that she rescheduled her appointment that was today to 04/06 due to weather. Patient states she will resume her current dosing, please call patient back only if there are different recommendations for dosing than what she is currently taking.  Date of last appointment with primary care: na  Okay to leave a detailed message: Yes

## 2021-06-07 NOTE — TELEPHONE ENCOUNTER
Patient Returning Call  Reason for call:  Pt is asking if she is supposed to come in and get the vaccine that she is supposed to have every 6 months, pt is requesting a call to verify if she needs to schedule that shot   Information relayed to patient:   There will be a message put in to your provider and a request for a call to verify this   Patient has additional questions:  No  If YES, what are your questions/concerns:  n/a  Okay to leave a detailed message?: Yes

## 2021-06-07 NOTE — TELEPHONE ENCOUNTER
FYI - Status Update  Who is Calling: Patient  Update: Calling to advise ACN that her ECHO procedure and her INR were canceled. Please call to discuss   Okay to leave a detailed message?:  Yes

## 2021-06-07 NOTE — TELEPHONE ENCOUNTER
RN cannot approve Refill Request    RN can NOT refill this medication historical medication requested.       Beena Stephen, [facility] Triage/Med Refill 4/20/2020    Requested Prescriptions   Pending Prescriptions Disp Refills     pantoprazole (PROTONIX) 40 MG tablet [Pharmacy Med Name: Pantoprazole Sodium Oral Tablet Delayed Release 40 MG] 90 tablet 0     Sig: TAKE ONE TABLET BY MOUTH ONE TIME DAILY       GI Medications Refill Protocol Passed - 4/18/2020 11:00 AM        Passed - PCP or prescribing provider visit in last 12 or next 3 months.     Last office visit with prescriber/PCP: 2/11/2020 Surya Delaney MD OR same dept: 2/11/2020 Surya Delaney MD OR same specialty: 2/11/2020 Surya Delaney MD  Last physical: Visit date not found Last MTM visit: Visit date not found   Next visit within 3 mo: Visit date not found  Next physical within 3 mo: Visit date not found  Prescriber OR PCP: Surya Delaney MD  Last diagnosis associated with med order: There are no diagnoses linked to this encounter.  If protocol passes may refill for 12 months if within 3 months of last provider visit (or a total of 15 months).

## 2021-06-07 NOTE — TELEPHONE ENCOUNTER
Prior Authorization Request  Who s requesting:  Pharmacy  Pharmacy Name and Location: [id number]  Medication Name: COUMADIN 1 mg tablet   Insurance Plan: [employer]   Insurance Member ID Number:  [id number]  CoverMyMeds Key: N/A  Informed patient that prior authorizations can take up to 10 business days for response:   NA  Okay to leave a detailed message: No

## 2021-06-07 NOTE — TELEPHONE ENCOUNTER
Please initiate Prior Auth for Coumadin - not on formul    [employer] D   [phone]    ID [id number]  [facility] [id number]  PCN [id number]

## 2021-06-07 NOTE — TELEPHONE ENCOUNTER
Central PA team  [phone]  Pool: HE PA MED ([id number])          PA has been initiated.       PA form completed and faxed insurance via Cover My Meds     Key:  [id number] - PA Case ID: [id number]     Medication:  Coumadin 1MG tablets    Insurance:  [employer]        Response will be received via fax and may take up to 5-10 business days depending on plan

## 2021-06-07 NOTE — TELEPHONE ENCOUNTER
Central PA team  [phone]  Pool: HE PA MED ([id number])          PA has been initiated.       PA form completed and faxed insurance via Cover My Meds     Key:  [id number]     Medication:  Coumadin 2.5MG tablets    Insurance:  [employer]        Response will be received via fax and may take up to 5-10 business days depending on plan

## 2021-06-07 NOTE — TELEPHONE ENCOUNTER
I suspect she is referring to Prolia.  This should be continued.  However, she could wait several weeks before getting the injection due to Covid 19 concerns

## 2021-06-08 NOTE — TELEPHONE ENCOUNTER
New Appointment Needed  What is the reason for the visit:    office visit- follow up   Provider Preference: PCP only  How soon do you need to be seen?: Monday 05.18.20- Patient stated that Dr. Delaney informed to come in for a face to face visit and she would like to have it scheduled.   Waitlist offered?: No  Okay to leave a detailed message:  Yes

## 2021-06-08 NOTE — PATIENT INSTRUCTIONS - HE
1.  Symptoms seem most suspicious for greater trochanteric bursitis.  She will be seen in clinic next week for a face-to-face appointment.  If clinically appropriate, she will be given a steroid injection for this.

## 2021-06-08 NOTE — PROGRESS NOTES
ASSESSMENT:  1.  Osteoporosis:  Helen as tolerated prolia well.  This will be continued.  Her next bone density study is due after November 11.  2.  Esophageal reflux:  She is asymptomatic taking Pantoprazole Every Day.  I would favor cutting down to every other day to determine whether daily use is actually necessary.  3.  Hyperlipidemia:  She has complained of muscle aches on statins in the past and has been off for many months.  Lipids will be rechecked.  4.  Atrial fibrillation:  Rate control is good.  She will continue warfarin and current medications    PLAN:  1.  Prolia today.  Repeat in 6 months  2.  Change pantoprazole to take every other day.  Note if HEARTBURN gets worse  3.  Recheck bone density after November 11  4.  Clinic follow-up in 6 months or as needed.  5.  Labs as outlined below.  She will be notified of test results  Orders Placed This Encounter   Procedures     DXA Bone Density Scan     Standing Status:   Future     Standing Expiration Date:   1/23/2018     Order Specific Question:   Can the procedure be changed per Radiologist protocol?     Answer:   Yes     Comprehensive Metabolic Panel     HM2(CBC w/o Differential)     Lipid Cascade     Order Specific Question:   Fasting is required?     Answer:   Unknown     Medications Discontinued During This Encounter   Medication Reason     furosemide (LASIX) 40 MG tablet Therapy completed           ASSESSED PROBLEMS:  1. Osteoporosis  DXA Bone Density Scan   2. Hyperlipidemia  Lipid Cascade   3. Medication monitoring encounter  Comprehensive Metabolic Panel    HM2(CBC w/o Differential)       CHIEF COMPLAINT:  Chief Complaint   Patient presents with     Follow-up     Injections     needs Prolia and PA done for this       HISTORY OF PRESENT ILLNESS:  Helen is a 86 y.o. female presenting to the clinic today for follow up on cervicalgia. Overall, she has been feeling well. The neck pain continues, though it is intermittent in severity.    Osteoporosis:  She will be due for DXA in November of 2017. DXA from 2015 showed low bone mass with a low T-score of -2.4 as measured at the right total hip. She has taken alendronate in the past. Her last Prolia injection was 7/21/2016 and she would like her subsequent shot today.     Knee pain: She fell in June and has continued to have some discoloration around the left knee cap with underlying pain. The pain is localized to the lateral aspects of the knee and not particularly tender over the knee cap when she palpates it. The knee pain sometimes bothers her with ambulation but she mentions that she was able to walk around red [place] without too much trouble. She has seen [facility] who aspirated the knee with large amount of hemarthrosis as she was getting a steroid injection.     Hypertension: She checks her blood pressure at home and generally it is well controlled. She did become somewhat alarmed when she had a systolic reading of 117, wondering if this was too low.     Hyperlipidemia: She has been trying to avoid foods high in cholesterol. She has been cutting out steak. Her last total cholesterol was 243, HDL was 46, and LDL was 163 on 8/2016. She is hesitant to start any cholesterol lowering medication.     Heart burn: She states that her acid reflux is generally well controlled with omeprazole but wonders if there are side effects from taking it.     REVIEW OF SYSTEMS:   She denies any ankle swelling on the left leg but her right leg has been swelling. She denies any chest pain or heart palpitations.   She becomes SOB with long walks but mostly has not been experiencing any dyspnea. She denies any SOB when supine.   Her bladder continues to give her trouble. She is followed by a urologist who informed her that the reflex in her bladder did not function very well.   All other systems are negative.    PFSH:  Reviewed, as below.     TOBACCO USE:  History   Smoking Status     Never Smoker   Smokeless Tobacco     Never Used  "      VITALS:  Vitals:    01/23/17 1158   BP: 122/64   Patient Site: Left Arm   Patient Position: Sitting   Cuff Size: Adult Regular   Pulse: 84   Weight: 139 lb 4.8 oz (63.2 kg)   Height: 5' 1.5\" (1.562 m)     Wt Readings from Last 3 Encounters:   01/23/17 139 lb 4.8 oz (63.2 kg)   11/29/16 143 lb (64.9 kg)   10/03/16 141 lb (64 kg)       PHYSICAL EXAM:  Constitutional:   Reveals an alert, pleasant talkative female. Does not appear acutely ill. Affect appropriate  Vitals: per nursing notes.  HEENT:  Atraumatic.   Neck:  Supple, no carotid bruits or adenopathy.  Back:  No spine or CVA pain.  Thorax:  No bony deformities.  Lungs: Clear to A&P without rales or wheezes.  Respiratory effort normal.  Cardiac:   Irregularly irregular rhythm, rate 70's no murmur.  Abdomen:  Soft, active bowel sounds without bruits, mass, or tenderness.  Extremities:   Thickening with possible fluid in  the left prepatellar area, mildly discolored related to previous hematoma, moderate crepitance to knee, no obvious ligamentous instability, no obvious joint effusion. No significant left ankle edema, trace right ankle edema.     Skin:  No jaundice, peripheral cyanosis or lesions to suggest malignancy.  Neuro:  Alert and oriented. Cranial nerves, motor, sensory exams are intact.  No gross focal deficits.  Psychiatric:  Memory intact, mood appropriate.    QUALITY MEASURES:  The following high BMI interventions were performed this visit: encouragement to exercise    ADDITIONAL HISTORY SUMMARIZED (2): Notes from [facility] reviewed regarding knee bursitis.  DECISION TO OBTAIN EXTRA INFORMATION (1): None.   RADIOLOGY TESTS (1): DXA from 11/2015 reviewed.  LABS (1): Labs ordered.  MEDICINE TESTS (1): None.  INDEPENDENT REVIEW (2 each): None.     The visit lasted a total of 20 minutes face to face with the patient. Over 50% of the time was spent counseling and educating the patient about knee pain.    Jesus CENTENO, am scribing for and in the " presence of, Dr. Delaney.    I, Dr. Delaney, personally performed the services described in this documentation, as scribed by Jesus Almonte in my presence, and it is both accurate and complete.    Dragon dictation was used for this note.  Speech recognition errors are a possibility.    MEDICATIONS:  Current Outpatient Prescriptions   Medication Sig Dispense Refill     atenolol (TENORMIN) 50 MG tablet Take 1 tablet (50 mg total) by mouth bedtime.   1 tab in pm. 90 tablet 3     diltiazem (CARDIZEM CD) 120 MG 24 hr capsule Take 1 capsule (120 mg total) by mouth daily. 90 capsule 2     diltiazem (CARTIA XT) 240 MG 24 hr capsule Take 1 capsule (240 mg total) by mouth daily. 90 capsule 2     furosemide (LASIX) 20 MG tablet Take 1 tablet (20 mg total) by mouth 2 (two) times a day. (Patient taking differently: Take 20 mg by mouth daily. ) 90 tablet 3     pantoprazole (PROTONIX) 40 MG tablet TAKE 1 TABLET (40 MG) BY MOUTH DAILY 90 tablet 3     potassium chloride SA (K-DUR,KLOR-CON) 10 MEQ tablet Take 1 tablet (10 mEq total) by mouth 2 (two) times a day. 180 tablet 3     warfarin (COUMADIN) 2.5 MG tablet Take 0.5- 1 tablets (1.25 to 2.5 mg) by mouth daily. Adjust dose based on INR results as directed. 90 tablet 3     Current Facility-Administered Medications   Medication Dose Route Frequency Provider Last Rate Last Dose     denosumab 60 mg (PROLIA 60 mg/ml)  60 mg Subcutaneous Q6 Months Surya Delaney MD   60 mg at 07/21/16 1324       Total data points: 4.

## 2021-06-08 NOTE — PROGRESS NOTES
ASSESSMENT:  1.  Right greater trochanteric bursitis:  This has been a recurrent issue for Helen.  She is interested in a steroid injection.  She has not a good candidate for anti-inflammatories given her anticoagulant use and history of diastolic CHF    2.  Diastolic congestive heart failure:  Ejection fraction was 52% on her recent echocardiogram.  She has noted worsening peripheral edema.  I would favor increasing furosemide from 20 to 40 mg daily.    3.  Chronic atrial fibrillation with history of pacemaker placement:  She attributes some of her worsening edema and shortness of breath to changing the rate of her pacemaker.  I have difficulty understanding why this should play a major issue.  She is not having problems with tachycardia:    4.  Essential hypertension:  Blood pressure is acceptable  PLAN:  1.  After informed consent and sterilely prepping, the right greater trochanter area was injected with 80 mg of Depo-Medrol with 2 cc of 2% lidocaine.  She tolerated the injection well.    2.  Report the effect of the injection on hip pain in 2 weeks    3.  Check BNP, basic metabolic panel and hemogram due to increased peripheral edema and suspected worsening diastolic CHF.    4.  Increase furosemide to 40 mg daily.  Increase potassium to 20 mEq daily    5.  Clinic follow-up in 1 month.  Report the effect of higher furosemide dose on edema in 2 weeks    Orders Placed This Encounter   Procedures     BNP(B-type Natriuretic Peptide)     Basic Metabolic Panel     HM2(CBC w/o Differential)     Medications Discontinued During This Encounter   Medication Reason     furosemide (LASIX) 20 MG tablet Dose adjustment     KLOR-CON M10 10 mEq tablet        Return in about 4 weeks (around 6/15/2020).    ASSESSED PROBLEMS:  1. Acute on chronic diastolic congestive heart failure (H)  furosemide (LASIX) 40 MG tablet    BNP(B-type Natriuretic Peptide)    HM2(CBC w/o Differential)   2. Diuretic-induced hypokalemia  potassium  chloride (KLOR-CON M10) 10 MEQ tablet    Basic Metabolic Panel   3. Trochanteric bursitis of right hip  methylPREDNISolone acetate injection 80 mg (DEPO-MEDROL)    lidocaine-EPINEPHrine 2 %-1:100,000 injection 1 mL (XYLOCAINE W/EPI)   4. Chronic atrial fibrillation  INR       CHIEF COMPLAINT:  Chief Complaint   Patient presents with     Hip Pain     Right hip pain- wants steroid injection     Edema     Right lower leg and ankle       HISTORY OF PRESENT ILLNESS:  Helen is a 89 y.o. female who presents with concern over right lateral hip pain.  She does have a history of recurrent greater trochanteric bursitis.  An x-ray of the hip and pelvis in October 2019 revealed moderate DJD of the hip.  Current pain is lateral.  Is aggravated by walking and change of position she also notes increased pain with lying on the right side.  She states she sat down hard on the edge of a chair 2 weeks ago which aggravated her symptoms.    She also reports increased ankle edema right leg greater than left.  She notes increased shortness of breath with exertion.  There is been no orthopnea or PND.  Ejection fraction was 52% on her last echocardiogram.  She does have a diagnosis of diastolic CHF    She has chronic atrial fibrillation with pacemaker in place.  She feels shortness of breath and edema have been worse since her pacemaker rate was last adjusted.    Blood pressure has been good when checked outside the clinic    REVIEW OF SYSTEMS:  She now uses a cane.  She has increased difficulty with ambulation  Comprehensive review of systems is negative.    PFSH:  .  Lives independently.  Has had [place] wild tickets since year 1.  She is contemplating not renewing tickets for next year due to difficulties with ambulation    TOBACCO USE:  Social History     Tobacco Use   Smoking Status Never Smoker   Smokeless Tobacco Never Used       VITALS:  Vitals:    05/18/20 1256   BP: 120/80   Patient Site: Left Arm   Patient Position:  "Sitting   Pulse: 81   SpO2: 97%   Weight: 135 lb (61.2 kg)   Height: 5' 0.63\" (1.54 m)     Wt Readings from Last 3 Encounters:   05/18/20 135 lb (61.2 kg)   05/12/20 130 lb (59 kg)   02/11/20 132 lb (59.9 kg)       PHYSICAL EXAM:  Constitutional:   Reveals an alert pleasant woman who ambulates with a cane.  She needs to push with her arms to get up from a chair and has a pronounced limp.   Vitals: per nursing notes.  HEENT: Atraumatic  Eyes: No abnormal pallor or conjunctival hyperemia.  Neck:  Supple, no carotid bruits or adenopathy.  Back:  No spine or CVA pain.  Thorax:  No bony deformities.  Lungs: Clear to A&P without rales or wheezes.  Respiratory effort normal.  Cardiac:   Irregular rhythm with rate about 80  Extremities: 2+ edema with erythema and stasis changes right lower leg with slightly lesser edema left.  Resolving ecchymoses right buttock.  Well localized tenderness over the right greater trochanter.  No shortening or rotation of the legs.  Straight leg raise and internal and external rotation seem fairly well-preserved.  Moderate crepitance to range of motion of the knees  Skin:  No jaundice, peripheral cyanosis or lesions to suggest malignancy.  Resolving ecchymoses as noted above with stasis changes lower legs  Neuro:  Alert and oriented.   No gross focal deficits.  Psychiatric:  Memory intact, mood appropriate.    DATA REVIEWED:  Additional History from Old Records Summarized (2): Records were reviewed  Decision to Obtain Records (1): None.   Radiology Tests Summarized or Ordered (1): .  Labs Reviewed or Ordered (1): Labs were ordered  Medicine Test Summarized or Ordered (1): Echocardiogram was reviewed  Independent Review of EKG or X-RAY(2 each): None.    The visit lasted a total of 25 minutes face to face with the patient. Over 50% of the time was spent counseling and educating the patient about management of her trochanteric bursitis and congestive heart failure.      Dragon dictation was used " for this note. Speech recognition errors are a possibility.     MEDICATIONS:  Current Outpatient Medications   Medication Sig Dispense Refill     biotin 5,000 mcg TbDL Take by mouth.       cholecalciferol, vitamin D3, 1,000 unit tablet Take 1,000 Units by mouth daily.       clindamycin (CLEOCIN) 300 MG capsule Prior to dental appointments  0     COUMADIN 1 mg tablet Take 1 to 2 tablets (1 to 2 mg) by mouth daily. Adjust dose based on INR results as directed.(uses in conjunction with 2.5 mg tabs) 180 tablet 1     COUMADIN 2.5 mg tablet TAKE ONE-HALF TO ONE TABLET (1.25 MG TO 2.5 MG) BY MOUTH DAILY. ADJUST DOSE BASED ON INR RESULTS AS DIRECTED. 60 tablet 1     cyanocobalamin 1000 MCG tablet Take 1,000 mcg by mouth daily.       losartan (COZAAR) 25 MG tablet Take 1 tablet (25 mg total) by mouth daily. 90 tablet 3     metoprolol tartrate (LOPRESSOR) 25 MG tablet Take 1 tablet (25 mg total) by mouth 2 (two) times a day. 180 tablet 3     multivitamin with minerals (THERA-M) 9 mg iron-400 mcg Tab tablet Take 1 tablet by mouth daily.       pantoprazole (PROTONIX) 40 MG tablet TAKE ONE TABLET BY MOUTH ONE TIME DAILY 90 tablet 3     potassium chloride (KLOR-CON M10) 10 MEQ tablet Take 2 tablets (20 mEq total) by mouth daily. 180 tablet 3     traMADol (ULTRAM) 50 mg tablet Take 1 tablet (50 mg total) by mouth every 6 (six) hours as needed for pain. 30 tablet 0     furosemide (LASIX) 40 MG tablet Take 1 tablet (40 mg total) by mouth daily. 90 tablet 3     Current Facility-Administered Medications   Medication Dose Route Frequency Provider Last Rate Last Dose     denosumab 60 mg (PROLIA 60 mg/ml)  60 mg Subcutaneous Q6 Months Eugene Lopez, PharmD   60 mg at 08/27/19 1117     lidocaine-EPINEPHrine 2 %-1:100,000 injection 1 mL (XYLOCAINE W/EPI)  1 mL Other Once Surya Delaney MD         methylPREDNISolone acetate injection 80 mg (DEPO-MEDROL)  80 mg Intramuscular Once Surya Delaney MD

## 2021-06-08 NOTE — PROGRESS NOTES
[facility] Follow Up Note  Assessment/Plan  1. Hypertension     2. Persistent atrial fibrillation  Electrocardiogram Perform - Clinic   3. Bradycardia  Electrocardiogram Perform - Clinic   4. SOB (shortness of breath)  BNP(B-type Natriuretic Peptide)   5. Encounter for long-term (current) use of other medications  Basic Metabolic Panel       1. Medications were reviewed and medication refills completed if requested.   A corrected Medication List is listed below on this After Visit    Summary.   New Medications, Refilled medications or Discontinued medications are also listed below.      2. Immunizations were reviewed and updated as necessary.   All up to date  3. If labs were done today, they will either be mailed to you or released to My Chart online if that has been activated.  Check a metabolic panel and a BNP today.  4. Take 25 mg of atenolol  5. STOP Diltiazem 240 mg and continue 120 mg dose and take that in AM  6. Further reduction if pulse does not improve  7. See Dr. Mathew or associate as soon as possible - she should come back to be seen at [facility] later this week or alternatively should be seen in cardiology clinic later this week.    8. Go To ER if symptoms worsen or especially increasing Shortness of breath or lightheadedness  9. Increase furosemide to 40 mg daily - previously it was felt the patient was actually taking 40 mg of Lasix but on her most recent 1/23/2017 visit was noted she was taking 20 mg.  It is possible that this reduction in furosemide dose resulted in some degree of fluid overload.  That would  possibly account for her weight gain and shortness of breath, but would  not account for her recent change in pulse though.     MORE THAN 40 MINUTES WAS SPENT WITH THE PATIENT TODAY OF WHICH GREATER THAN 50% WAS SPENT IN COUNSELING AND COORDINATION OF CARE -  DISCUSSING THE AFOREMENTIONED DIAGNOSES, TREATMENT, AND PLAN.      Body mass index is 27.07 kg/(m^2).    Jose Guadalupe Stephens,  MD  Internal Medicine & Travel Medicine  [address] [address]  Voice: [phone]  Fax: [phone]    +++++++++++++++++++++++++++++++++++++++    Helen Beckham   86 y.o. female    Date of Visit: 2/6/2017    Chief Complaint   Patient presents with     Irregular Heart Beat     Subjective  1. Health Maintenance  - immunizations are up-to-date.    2.  Bradycardia and shortness of breath - the patient had been seen on January 23 with reported pulse on that office visit of 84.  Her rate was felt to be well controlled.  At that visit was also noted that she was really taking 20 mg of furosemide instead of 40 mg as was felt previously.  Not sure when that change went into effect. On EKG today she has a much slower pulse with a rate of 45.  The EKG interpretation a junctional rhythm but its are regular and so I think it still is actually atrial fibrillation with a slow ventricular response.      Now in the last week the patient has noted a little bit of increase in shortness of breath.  She is also noted when checking her pulse in the last few days it is very slow in the 40s.  She talked to her son-in-law who is a physician and so 2 days ago she decreased her atenolol from 50 mg 25 mg.  This does not seem to have any impact on her rate.      She has decreased exercise tolerance with increased dyspnea on exertion.  She denies any chest pain whatsoever.  She has no PND or orthopnea.  She was unaware of it but in fact she has gained 6 pounds between January 23 and today if our scale is correct.      Temporally her symptoms seem to come on after her recent prolia shot but I reassured her that I do not think there is any cause and effect relationship.    She called her cardiologist's office today and could not get in and so she was advised to be seen in primary care.      ROS A comprehensive review of systems was performed and was negative other than the problems noted  above.    Medications, allergies, and problem list were reviewed and updated    Past Medical History:   Diagnosis Date     Atrial fibrillation, persistent      Cellulitis of third toe of right foot      Diverticulitis      Edema      Esophageal reflux      Hyperlipidemia      Hypertension      Osteoporosis      Trochanteric bursitis     osteoarthritis     Venous insufficiency      Past Surgical History:   Procedure Laterality Date     EYE SURGERY      bilateral cataract surgery     FRACTURE SURGERY      left toe     JOINT REPLACEMENT       MI CARDIOVERSION ELECTIVE ARRHYTHMIA EXTERNAL      Description: Elective Cardioversion External;  Recorded: 05/19/2014;  Comments: 4/28/14     MI EXCISE HAND/FOOT NEUROMA      Description: Excision Of Neuroma Of Right Foot;  Proc Date: 04/01/2005;     MI RECONSTR TOTAL SHOULDER IMPLANT      Description: Shoulder Arthroplasty Total Shoulder Replacement;  Proc Date: 07/13/2011;  Comments: REVERSE TOTAL SHOULDER     MI REMOVAL OF TONSILS,<11 Y/O      Description: Tonsillectomy;  Recorded: 04/07/2014;     TONSILLECTOMY       Social History     Social History     Marital status:      Spouse name: N/A     Number of children: N/A     Years of education: N/A     Occupational History     Not on file.     Social History Main Topics     Smoking status: Never Smoker     Smokeless tobacco: Never Used     Alcohol use No     Drug use: No     Sexual activity: Not on file     Other Topics Concern     Not on file     Social History Narrative    She is .  Her son Nigel is heavily involved in her care.  She has grandchildren and great-grandchildren.     Family History   Problem Relation Age of Onset     Heart disease Mother      No Medical Problems Father      COPD Sister      Kidney failure Brother      Liver disease Brother        Current Outpatient Prescriptions   Medication Sig Dispense Refill     atenolol (TENORMIN) 50 MG tablet Take 1 tablet (50 mg total) by mouth bedtime.   1 tab  in pm. 90 tablet 3     diltiazem (CARDIZEM CD) 120 MG 24 hr capsule Take 1 capsule (120 mg total) by mouth daily. 90 capsule 2     furosemide (LASIX) 20 MG tablet Take 1 tablet (20 mg total) by mouth 2 (two) times a day. (Patient taking differently: Take 20 mg by mouth daily. ) 90 tablet 3     pantoprazole (PROTONIX) 40 MG tablet TAKE 1 TABLET (40 MG) BY MOUTH DAILY 90 tablet 3     potassium chloride SA (K-DUR,KLOR-CON) 10 MEQ tablet Take 1 tablet (10 mEq total) by mouth 2 (two) times a day. 180 tablet 3     warfarin (COUMADIN) 2.5 MG tablet Take 0.5- 1 tablets (1.25 to 2.5 mg) by mouth daily. Adjust dose based on INR results as directed. 90 tablet 3     Current Facility-Administered Medications   Medication Dose Route Frequency Provider Last Rate Last Dose     denosumab 60 mg (PROLIA 60 mg/ml)  60 mg Subcutaneous Q6 Months Surya Delaney MD   60 mg at 01/23/17 1251       Allergies   Allergen Reactions     Atorvastatin      Bee Sting Kit      Codeine      Other Allergy (See Comments)      Contrast Media Ready-Box MISC, 04/28/2008.       Penicillins      Simvastatin Myalgia       Exam  Vitals:    02/06/17 1330   BP: 128/52   Pulse: (!) 47   SpO2: 95%     The patient is well-groomed and well-dressed alert and oriented ×3.   Head: Negative  HEENT: Normal  Lungs: Clear to auscultation without rales or wheezing.    Cardiac: No S3 murmur or ectopy.  Slow pulse of 48.  Abdomen: Soft bowel sounds normal.  No significant local tenderness.  Extremities: Trace to 1+ peripheral edema.  Pulses are normal and symmetrical.  Neuro: Gait abnormal and weak and mentation normal. Cranial Nerves intact.    EKG:  Junctional rhythm versus atrial fibrillation.  Incomplete right bundle branch block  ST & T wave abnormality, consider inferior ischemia  ST & T wave abnormality, consider anterior ischemia  Abnormal ECG  When compared with ECG of 24-JUN-2015 14:18,  Junctional rhythm has replaced Atrial fibrillation  Vent. rate has decreased  BY 60 BPM  Incomplete right bundle branch block has replaced Right bundle branch block        Jose Guadalupe Stephens MD

## 2021-06-08 NOTE — PROGRESS NOTES
Patient would like to be contacted via the following phone number [phone]    ASSESSMENT:  1.  Right hip pain:  Helen has a past history of recurrent greater trochanteric bursitis.  Symptoms seem consistent with what she has experienced in the past.  Previously has had a good response to steroid injections.  NSAIDs are contraindicated since she is on an anticoagulant.  After discussion, she will be seen in clinic face-to-face next week.  If it seems appropriate, she will receive an injection for greater trochanteric bursitis.    2.  Chronic atrial fibrillation:  She is on warfarin as an anticoagulant and metoprolol for rate control.  She denies any problems with symptomatic palpitations    3.  Osteoporosis on Prolia:  No fractures since she was last seen    4.  Essential hypertension:  Pressure has been good when checked    Preventive Health Care: This will be reviewed at her face-to-face visit    PLAN:  1.  The next week for a face-to-face visit.  If clinically appropriate, she will receive a steroid injection for greater trochanteric bursitis            CHIEF COMPLAINT:  Chief Complaint   Patient presents with     Hip Pain     right       HISTORY OF PRESENT ILLNESS:  Helen is a 89 y.o. female calling in to the clinic today complaining of pain in the right lateral hip area.  This has troubled her for several weeks.  She notes discomfort laying on that side and also problems with ambulation.  She has a history of recurrent greater trochanteric bursitis and feels symptoms are similar to what she has noted in the past.  She is not a candidate for NSAIDs since she is on Coumadin.  She previously had a good response to steroid injections and would be interested in this again    She otherwise has felt well since she was last seen.  She remains on warfarin as an anticoagulant due to chronic atrial fib with metoprolol for rate control.    She states blood pressure has been good.  She has a diagnosis of diastolic heart  failure.  She has not had orthopnea or significant ankle swelling    REVIEW OF SYSTEMS:   All other systems are negative.    PFSH:  .  Lives independently.  Has had season tickets to the [place] for many years    TOBACCO USE:  Social History     Tobacco Use   Smoking Status Never Smoker   Smokeless Tobacco Never Used       VITALS:  Stated Blood Pressure  133/58  Stated Pulse  75  Stated Weight 130    PHYSICAL EXAM:  (observations via phone)  Pleasant talkative woman who does not sound in major distress        The visit lasted a total of 9 minutes   CA intake time  6 minutes    Telephone Consent was completed by  staff and confirmed by jeb    I have reviewed and updated the patient's Past Medical History, Social History, Family History as appropriate.    MEDICATIONS: Reviewed and updated per CA and MD  Current Outpatient Medications   Medication Sig Dispense Refill     biotin 5,000 mcg TbDL Take by mouth.       cholecalciferol, vitamin D3, 1,000 unit tablet Take 1,000 Units by mouth daily.       clindamycin (CLEOCIN) 300 MG capsule Prior to dental appointments  0     COUMADIN 1 mg tablet Take 1 to 2 tablets (1 to 2 mg) by mouth daily. Adjust dose based on INR results as directed.(uses in conjunction with 2.5 mg tabs) 180 tablet 1     COUMADIN 2.5 mg tablet TAKE ONE-HALF TO ONE TABLET (1.25 MG TO 2.5 MG) BY MOUTH DAILY. ADJUST DOSE BASED ON INR RESULTS AS DIRECTED. 60 tablet 1     cyanocobalamin 1000 MCG tablet Take 1,000 mcg by mouth daily.       furosemide (LASIX) 20 MG tablet Take 1 tablet (20 mg total) by mouth daily. 90 tablet 3     KLOR-CON M10 10 mEq tablet Take 1 tablet (10 mEq total) by mouth daily. 90 tablet 3     losartan (COZAAR) 25 MG tablet Take 1 tablet (25 mg total) by mouth daily. 90 tablet 3     metoprolol tartrate (LOPRESSOR) 25 MG tablet Take 1 tablet (25 mg total) by mouth 2 (two) times a day. 180 tablet 3     multivitamin with minerals (THERA-M) 9 mg iron-400 mcg Tab  "tablet Take 1 tablet by mouth daily.       pantoprazole (PROTONIX) 40 MG tablet TAKE ONE TABLET BY MOUTH ONE TIME DAILY 90 tablet 3     traMADol (ULTRAM) 50 mg tablet Take 1 tablet (50 mg total) by mouth every 6 (six) hours as needed for pain. 30 tablet 0     Current Facility-Administered Medications   Medication Dose Route Frequency Provider Last Rate Last Dose     denosumab 60 mg (PROLIA 60 mg/ml)  60 mg Subcutaneous Q6 Months Eugene Lopez, PharmD   60 mg at 08/27/19 1112           The patient has been notified of following:     \"This telephone visit will be conducted via a call between you and your physician/provider. We have found that certain health care needs can be provided without the need for a physical exam.  This service lets us provide the care you need with a short phone conversation.  If a prescription is necessary we can send it directly to your pharmacy.  If lab work is needed we can place an order for that and you can then stop by our lab to have the test done at a later time.    If during the course of the call the physician/provider feels a telephone visit is not appropriate, you will not be charged for this service.\"   "

## 2021-06-08 NOTE — TELEPHONE ENCOUNTER
RN cannot approve Refill Request    RN can NOT refill this medication Protocol failed and NO refill given.       Beena Stephen, [facility] Triage/Med Refill 6/12/2020    Requested Prescriptions   Pending Prescriptions Disp Refills     COUMADIN 1 mg tablet [Pharmacy Med Name: Coumadin Oral Tablet 1 MG] 180 tablet 0     Sig: Take 1 to 2 tablets (1 to 2 mg) by mouth daily. Adjust dose based on INR results as directed.(uses in conjunction with 2.5 mg tabs).       Warfarin Refill Protocol  Failed - 6/11/2020  7:06 PM        Failed -  Route to appropriate pool/provider     Last Anticoagulation Summary:   Anticoagulation Episode Summary     Current INR goal:   2.0-3.0   TTR:   61.9 % (1 y)   Next INR check:   6/25/2020   INR from last check:   2.90 (6/11/2020)   Weekly max warfarin dose:      Target end date:      INR check location:      Preferred lab:      Send INR reminders to:   [facility]    Indications    Chronic atrial fibrillation (H) [I48.20]           Comments:            Anticoagulation Care Providers     Provider Role Specialty Phone number    Surya Delaney MD Referring Internal Medicine [phone]                Passed - Provider visit in last year     Last office visit with prescriber/PCP: 5/18/2020 Surya Delaney MD OR same dept: 5/18/2020 Surya Delaney MD OR same specialty: 5/18/2020 Surya Delaney MD  Last physical: Visit date not found Last MTM visit: Visit date not found    Next appt within 3 mo: Visit date not found Next physical within 3 mo: Visit date not found  Prescriber OR PCP: Surya Delaney MD  Last diagnosis associated with med order: 1. Persistent atrial fibrillation (H)  - COUMADIN 1 mg tablet [Pharmacy Med Name: Coumadin Oral Tablet 1 MG]; Take 1 to 2 tablets (1 to 2 mg) by mouth daily. Adjust dose based on INR results as directed.(uses in conjunction with 2.5 mg tabs)  Dispense: 180 tablet; Refill: 0    If protocol passes may refill for 6 months if within 3 months of  last provider visit (or a total of 9 months).

## 2021-06-08 NOTE — TELEPHONE ENCOUNTER
ANTICOAGULATION  MANAGEMENT    Assessment     Today's INR result of 4.3 is Supratherapeutic (goal INR of 2.0-3.0)        Warfarin taken as previously instructed    No new diet changes affecting INR    No new medication/supplements affecting INR    Continues to tolerate warfarin with no reported s/s of bleeding or thromboembolism     Previous INR was Supratherapeutic    Plan:     Spoke with Helen regarding INR result and instructed:     Warfarin Dosing Instructions:  Hold today then change warfarin dose to 1 mg daily on Tue, Thur, Sat; and 2 mg daily rest of week  (12 % change)    Instructed patient to follow up no later than: 10 days.    Education provided: importance of taking warfarin as instructed    Helen verbalizes understanding and agrees to warfarin dosing plan.    Instructed to call the [facility] for any changes, questions or concerns. (#[phone])   ?   Torres Ziegler RN    Subjective/Objective:      Helen Beckham, a 89 y.o. female is on warfarin.     Helen reports:     Home warfarin dose: verbally confirmed home dose with pt and updated on anticoagulation calendar     Missed doses: No     Medication changes:  No     S/S of bleeding or thromboembolism:  No     New Injury or illness:  No     Changes in diet or alcohol consumption:  No     Upcoming surgery, procedure or cardioversion:  No    Anticoagulation Episode Summary     Current INR goal:   2.0-3.0   TTR:   62.9 % (1 y)   Next INR check:   6/11/2020   INR from last check:   4.30! (6/1/2020)   Weekly max warfarin dose:      Target end date:      INR check location:      Preferred lab:      Send INR reminders to:   [facility]    Indications    Chronic atrial fibrillation [I48.20]           Comments:            Anticoagulation Care Providers     Provider Role Specialty Phone number    Surya Delaney MD Referring Internal Medicine [phone]

## 2021-06-08 NOTE — PROGRESS NOTES
[facility] HEART [facility]   Arrhythmia Clinic    Assessment/Plan:  Diagnoses and all orders for this visit:    Chronic atrial fibrillation and recommended to further decrease atenolol to 25 mg by mouth daily as twelve-lead EKG shows atrial fib with slow ventricular response of 54 though asymptomatic.  Recommended that she continue to do some heart rate and blood pressure checks but far less frequent and to call if notes heart rate 55 or less.  To call if further episodes of shortness of breath, lightheadedness dizziness or more off balance.  [id number] score of 4 and on chronic warfarin.  Follow-up with Dr. Mathew in 2 months.    A-fib  -     ECG Clinic Future    Essential hypertension with goal blood pressure less than 140/90 well controlled despite significant decrease in medications and will likely tolerate further decrease today.    Persistent atrial fibrillation  -     atenolol (TENORMIN) 25 MG tablet; Take 1 tablet (25 mg total) by mouth daily.  Dispense: 90 tablet; Refill: 3      ______________________________________________________________________    Subjective:    I had the opportunity to see Helen Beckham at the [facility]. Helen Beckham is a 86 y.o. female and here for EP follow-up after noted to be in junctional rhythm and lightheaded and very SOB.  She is now back in chronic A. fib with slow ventricular response on recent Holter.  Helen Beckham has a known history of persistent atrial fib and in 2015 she went off of amiodarone due to concern of hair loss and fatigue.  She stayed in sinus rhythm for a while but then went into persistent atrial fib and was switched to rate control as asymptomatic.  Her previous Holter demonstrated A. fib with RVR and placed on  diltiazem and atenolol to  achieve adequate rate control.  She appears to have had lower heart rates in atrial fibrillation and describes fairly sudden onset of symptoms of shortness of breath with minimal activity such as  walking room to room about 2 weeks ago.  She had some lightheadedness.  Shortness of breath and lightheadedness have both resolved with discontinuing diltiazem.  She has been checking her blood pressure and heart rate numerous times during a day and tells me that it took about a week after stopping medication before her heart rate is seemed to correct.  She is mostly getting heart rates in the 60s now at home.  Her blood pressure is normotensive on home readings as well.  She denies any other symptoms or changes including chest pain or pressure.  She is currently on atenolol 50 mg by mouth daily.  ______________________________________________________________________    Problem List:  Patient Active Problem List   Diagnosis     Hyperlipidemia     Osteoporosis     Essential hypertension     Chronic atrial fibrillation     Esophageal Reflux     Diverticulosis     Osteoarthritis     Medical History:  Past Medical History:   Diagnosis Date     Cellulitis of third toe of right foot      Diverticulitis      Esophageal reflux      Essential hypertension     Created by Conversion  Replacement Utility updated for latest IMO load     Hyperlipidemia      Osteoporosis      Permanent atrial fibrillation      [id number] score of  5 with 2 points for age and 1 each for female, HTN and CHF---on chronic warfarin afib with RVR 3/26/14 new amio start.  4/24/15 off amio for ? Hair loss and fatigue 6/15 inpersistent afib and asymptomatic and switched to rate control      Trochanteric bursitis     osteoarthritis     Venous insufficiency      Surgical History:  Past Surgical History:   Procedure Laterality Date     EYE SURGERY      bilateral cataract surgery     FRACTURE SURGERY      left toe     JOINT REPLACEMENT       [place] CARDIOVERSION ELECTIVE ARRHYTHMIA EXTERNAL      Description: Elective Cardioversion External;  Recorded: 05/19/2014;  Comments: 4/28/14     [place] EXCISE HAND/FOOT NEUROMA      Description: Excision Of Neuroma Of Right  Foot;  Proc Date: 04/01/2005;     [place] RECONSTR TOTAL SHOULDER IMPLANT      Description: Shoulder Arthroplasty Total Shoulder Replacement;  Proc Date: 07/13/2011;  Comments: REVERSE TOTAL SHOULDER     TONSILLECTOMY       Social History:  Social History   Substance Use Topics     Smoking status: Never Smoker     Smokeless tobacco: Never Used     Alcohol use No        Review of Systems: Review of Systems:   General: WNL  Eyes: WNL  Ears/Nose/Throat: WNL  Lungs: WNL  Heart: WNL  Stomach: WNL  Bladder: WNL  Muscle/Joints: WNL  Skin: WNL  Nervous System: WNL  Mental Health: WNL     Blood: WNL      Family History:  Family History   Problem Relation Age of Onset     Heart disease Mother      No Medical Problems Father      COPD Sister      Kidney failure Brother      Liver disease Brother      No Medical Problems Son      No Medical Problems Daughter          Allergies:  Allergies   Allergen Reactions     Atorvastatin      Bee Sting Kit      Codeine      Other Allergy (See Comments)      Contrast Media Ready-Box MISC, 04/28/2008.       Penicillins      Simvastatin Myalgia     Medications:  Current Outpatient Prescriptions   Medication Sig Dispense Refill     atenolol (TENORMIN) 25 MG tablet Take 1 tablet (25 mg total) by mouth daily. 90 tablet 3     furosemide (LASIX) 40 MG tablet Take 40 mg by mouth daily.       pantoprazole (PROTONIX) 40 MG tablet TAKE 1 TABLET (40 MG) BY MOUTH DAILY 90 tablet 3     potassium chloride SA (K-DUR,KLOR-CON) 10 MEQ tablet Take 1 tablet (10 mEq total) by mouth 2 (two) times a day. (Patient taking differently: Take 10 mEq by mouth daily. ) 180 tablet 3     warfarin (COUMADIN) 2.5 MG tablet Take 0.5- 1 tablets (1.25 to 2.5 mg) by mouth daily. Adjust dose based on INR results as directed. 90 tablet 3     Current Facility-Administered Medications   Medication Dose Route Frequency Provider Last Rate Last Dose     denosumab 60 mg (PROLIA 60 mg/ml)  60 mg Subcutaneous Q6 Months Surya Delaney MD   60  "mg at 01/23/17 1251       Objective:   Vital signs:  Visit Vitals     /64 (Patient Site: Left Arm, Patient Position: Sitting, Cuff Size: Adult Regular)     Pulse (!) 56     Resp 16     Ht 5' 2\" (1.575 m)     Wt 136 lb (61.7 kg)     BMI 24.87 kg/m2         Physical Exam:    GENERAL APPEARANCE: Alert, cooperative and in no acute distress.  HEENT: No scleral icterus. No Xanthelasma. Oral mucuos membranes pink and moist.  NECK: JVP Nl.   CHEST: clear to auscultation  CARDIOVASCULAR: S1, S2 without murmur ,clicks or rubs. Irregular, irregular.  Radial and posterior tibial pulses are intact and symetric.   EXTREMITIES: No cyanosis, clubbing or edema.  Wearing bilateral knee-high support stockings.    Results personally reviewed:   February 13, 2017 24-hour Holter shows:     INTERPRETATION: Predominant rhythm is atrial fibrillation, which is present  throughout the monitoring period. The patient did have slow ventricular response  with heart rates ranging from 33 beats per minute to 88 beats per minute, averaging  only 46 beats per minute. Average resting heart rate appeared to be approximately  40 beats per minute. Ventricular response with activity in the early morning hours  ranged up to 70 beats per minute. Longest R to R interval was 2 seconds. Rare  ventricular premature beats were noted, 36 over 24 hours. There was 1 ventricular  couplet. There were several short runs of what appeared to be an accelerated  idioventricular rhythm, rate 88 beats per minute for 2 and 3 seconds at a time     CONCLUSION: Persistent atrial fibrillation with slow ventricular response with  heart rates commonly in the 40s during daytime hours. Clinical correlation advised.      1/14 ECHO EF 50% and borderline global hypokinesis. RA and LA mod dil. Mod TR.   July 2015 24-hour Holter shows A. fib throughout with average ventricular response of 109. Ventricular range . Right bundle branch block.       Results for orders placed or " performed in visit on 02/17/17   ECG Clinic Future   Result Value Ref Range    SYSTOLIC BLOOD PRESSURE  mmHg    DIASTOLIC BLOOD PRESSURE  mmHg    VENTRICULAR RATE 54 BPM    ATRIAL RATE 63 BPM    P-R INTERVAL  ms    QRS DURATION 118 ms    Q-T INTERVAL 486 ms    QTC CALCULATION (BEZET) 460 ms    P Axis  degrees    R AXIS 65 degrees    T AXIS -57 degrees    MUSE DIAGNOSIS       Undetermined rhythm  Incomplete right bundle branch block  ST & T wave abnormality, consider inferior ischemia  ST & T wave abnormality, consider anterior ischemia  Abnormal ECG  When compared with ECG of 06-FEB-2017 13:45,  Current undetermined rhythm precludes rhythm comparison, needs review         TSH:   Lab Results   Component Value Date    TSH 1.92 02/08/2017     BNP:   Lab Results   Component Value Date     (H) 02/06/2017     BMP:  Lab Results   Component Value Date    CREATININE 0.99 02/06/2017    BUN 27 02/06/2017     02/06/2017    K 4.2 02/06/2017     (H) 02/06/2017    CO2 21 (L) 02/06/2017       This note has been dictated using voice recognition software. Any grammatical or context distortions are unintentional and inherent to the software.    GARCÍA WITT RN, [facility] HEART [facility]  [phone]

## 2021-06-08 NOTE — TELEPHONE ENCOUNTER
ANTICOAGULATION  MANAGEMENT    Assessment     Today's INR result of 3.9 is Supratherapeutic (goal INR of 2.0-3.0)        Warfarin taken as previously instructed    No new diet changes affecting INR    No new medication/supplements affecting INR did have steroid injection to hip today    Continues to tolerate warfarin with no reported s/s of bleeding or thromboembolism     Previous INR was Therapeutic    Plan:     Left a detailed message for Helen regarding INR result and instructed:     Warfarin Dosing Instructions:  skip today then change warfarin dose to 1.25 mg daily on tue/sat; and 2 mg daily rest of week  (5.7 % change)    Instructed patient to follow up no later than: 1-2 weeks      Instructed to call the [facility] for any changes, questions or concerns. (#[phone])   ?   Jack Curtis RN    Subjective/Objective:      Helen Beckham, a 89 y.o. female is on warfarin.     Helen reports:     Home warfarin dose: as updated on anticoagulation calendar per template     Missed doses: No     Medication changes:  No     S/S of bleeding or thromboembolism:  No     New Injury or illness:  No     Changes in diet or alcohol consumption:  No     Upcoming surgery, procedure or cardioversion:  No    Anticoagulation Episode Summary     Current INR goal:   2.0-3.0   TTR:   66.7 % (1 y)   Next INR check:   5/26/2020   INR from last check:   3.90! (5/18/2020)   Weekly max warfarin dose:      Target end date:      INR check location:      Preferred lab:      Send INR reminders to:   [facility]    Indications    Chronic atrial fibrillation [I48.20]           Comments:            Anticoagulation Care Providers     Provider Role Specialty Phone number    Surya Delaney MD Referring Internal Medicine [phone]

## 2021-06-08 NOTE — PATIENT INSTRUCTIONS - HE
1.  Increase furosemide to 40 mg daily.  Note effect on edema.    2.  Increase potassium to two 10 meq pills daily    3.  See in one month.  Check BMP then.    4.  Report effect of stroid injection on hip pain in two weeks.

## 2021-06-08 NOTE — TELEPHONE ENCOUNTER
ANTICOAGULATION  MANAGEMENT    Assessment     Today's INR result of 2.9 is Therapeutic (goal INR of 2.0-3.0)        Warfarin taken as previously instructed    No new diet changes affecting INR    No new medication/supplements affecting INR    Continues to tolerate warfarin with no reported s/s of bleeding or thromboembolism     Previous INR was Therapeutic    Plan:     Spoke with Helen regarding INR result and instructed:     Warfarin Dosing Instructions:  Continue current warfarin dose 1 mg daily on tue/thu/sat; and 2 mg daily rest of week  (0 % change)    Instructed patient to follow up no later than: two weeks    Helen verbalizes understanding and agrees to warfarin dosing plan.    Instructed to call the [facility] for any changes, questions or concerns. (#[phone])   ?   Jack Curtis RN    Subjective/Objective:      Helen Beckham, a 89 y.o. female is on warfarin.     Helen reports:     Home warfarin dose: as updated on anticoagulation calendar per template     Missed doses: No     Medication changes:  No     S/S of bleeding or thromboembolism:  No     New Injury or illness:  No     Changes in diet or alcohol consumption:  No     Upcoming surgery, procedure or cardioversion:  No    Anticoagulation Episode Summary     Current INR goal:   2.0-3.0   TTR:   62.0 % (1 y)   Next INR check:   6/25/2020   INR from last check:   2.90 (6/11/2020)   Weekly max warfarin dose:      Target end date:      INR check location:      Preferred lab:      Send INR reminders to:   [facility]    Indications    Chronic atrial fibrillation [I48.20]           Comments:            Anticoagulation Care Providers     Provider Role Specialty Phone number    Surya Delaney MD Referring Internal Medicine [phone]

## 2021-06-08 NOTE — TELEPHONE ENCOUNTER
Who is calling:  Patient  Reason for Call:  Asking for call back from [facility] to discuss dosing and next follow up date for INR.   Date of last appointment with primary care: na  Okay to leave a detailed message: Yes

## 2021-06-08 NOTE — TELEPHONE ENCOUNTER
FYI - Status Update  Who is Calling: Patient  Update: Requests a call back to discuss dosing, requests you call her soon as she needs to leave in a little while.  Okay to leave a detailed message?:  No

## 2021-06-09 NOTE — TELEPHONE ENCOUNTER
ANTICOAGULATION  MANAGEMENT    Assessment     Today's INR result of 2.0 is Therapeutic (goal INR of 2.0-3.0)        Warfarin taken as previously instructed    No new diet changes affecting INR    No new medication/supplements affecting INR    Continues to tolerate warfarin with no reported s/s of bleeding or thromboembolism     Previous INR was Subtherapeutic    Plan:     Left a detailed message for Helen regarding INR result and instructed:     Warfarin Dosing Instructions:  Continue current warfarin dose 1 mg daily on sun/tue/thu; and 2 mg daily rest of week  (0 % change)    Instructed patient to follow up no later than: two weeks  .    Instructed to call the [facility] for any changes, questions or concerns. (#[phone])   ?   Jack Curtis RN    Subjective/Objective:      Helen Beckham, a 89 y.o. female is on warfarin.     Helen reports:     Home warfarin dose: as updated on anticoagulation calendar per template     Missed doses: No     Medication changes:  No     S/S of bleeding or thromboembolism:  No     New Injury or illness:  No     Changes in diet or alcohol consumption:  No     Upcoming surgery, procedure or cardioversion:  No    Anticoagulation Episode Summary     Current INR goal:   2.0-3.0   TTR:   62.6 % (1 y)   Next INR check:   8/6/2020   INR from last check:   2.00 (7/23/2020)   Weekly max warfarin dose:      Target end date:      INR check location:      Preferred lab:      Send INR reminders to:   [facility]    Indications    Chronic atrial fibrillation (H) [I48.20]           Comments:            Anticoagulation Care Providers     Provider Role Specialty Phone number    Surya Delaney MD Referring Internal Medicine [phone]

## 2021-06-09 NOTE — PATIENT INSTRUCTIONS - HE
"1.  Continue furosemide 40 mg daily    2.  Use a compressive stocking on right leg for prolonged sitting or standing.    3.  Use Tramadol one half of 50 mg tablet in evening as needed for back pain.      4.  Heat and gentle stretching for back    5  Add tylenol one tab along with the tramadol.  Tylenol could be use up to three times daily    6.  Check basic metabolic panel as \"lab only\"  Appointment.    7.  See in three months or as needed.  "

## 2021-06-09 NOTE — TELEPHONE ENCOUNTER
ANTICOAGULATION  MANAGEMENT    Assessment     Today's INR result of 1.8 is Subtherapeutic (goal INR of 2.0-3.0)        Warfarin taken as previously instructed    No new diet changes affecting INR    No new medication/supplements affecting INR    Continues to tolerate warfarin with no reported s/s of bleeding or thromboembolism     Previous INR was Therapeutic    Plan:     Spoke with Helen regarding INR result and instructed:     Warfarin Dosing Instructions:  Change warfarin dose to 1 mg daily on sun/tue/thu; and 2 mg daily rest of week  (0 % change)    Instructed patient to follow up no later than: one month      ronda verbalizes understanding and agrees to warfarin dosing plan.    Instructed to call the [facility] for any changes, questions or concerns. (#[phone])   ?   Jack Curtis RN    Subjective/Objective:      Helen Beckham, a 89 y.o. female is on warfarin.     Helen reports:     Home warfarin dose: as updated on anticoagulation calendar per template     Missed doses: No     Medication changes:  No     S/S of bleeding or thromboembolism:  No     New Injury or illness:  No     Changes in diet or alcohol consumption:  No     Upcoming surgery, procedure or cardioversion:  No    Anticoagulation Episode Summary     Current INR goal:   2.0-3.0   TTR:   63.4 % (1 y)   Next INR check:   7/23/2020   INR from last check:   1.80! (7/9/2020)   Weekly max warfarin dose:      Target end date:      INR check location:      Preferred lab:      Send INR reminders to:   [facility]    Indications    Chronic atrial fibrillation (H) [I48.20]           Comments:            Anticoagulation Care Providers     Provider Role Specialty Phone number    Surya Delaney MD Referring Internal Medicine [phone]

## 2021-06-09 NOTE — TELEPHONE ENCOUNTER
ANTICOAGULATION  MANAGEMENT    Assessment     Today's INR result of 3.2 is Supratherapeutic (goal INR of 2.0-3.0)        Warfarin taken as previously instructed    No new diet changes affecting INR    No new medication/supplements affecting INR    Continues to tolerate warfarin with no reported s/s of bleeding or thromboembolism     Previous INR was Therapeutic    Plan:     Spoke with Helen regarding INR result and instructed:     Warfarin Dosing Instructions:  Change warfarin dose to 2 mg daily on mon/wed/fri; and 1 mg daily rest of week  (9 % change)    Instructed patient to follow up no later than: two weeks    Helen verbalizes understanding and agrees to warfarin dosing plan.    Instructed to call the [facility] for any changes, questions or concerns. (#[phone])   ?   Jack Curtis RN    Subjective/Objective:      Helen Beckham, a 89 y.o. female is on warfarin.     Helen reports:     Home warfarin dose: as updated on anticoagulation calendar per template     Missed doses: No     Medication changes:  No     S/S of bleeding or thromboembolism:  No     New Injury or illness:  No     Changes in diet or alcohol consumption:  No     Upcoming surgery, procedure or cardioversion:  No    Anticoagulation Episode Summary     Current INR goal:   2.0-3.0   TTR:   61.9 % (1 y)   Next INR check:   7/9/2020   INR from last check:   3.20! (6/25/2020)   Weekly max warfarin dose:      Target end date:      INR check location:      Preferred lab:      Send INR reminders to:   [facility]    Indications    Chronic atrial fibrillation (H) [I48.20]           Comments:            Anticoagulation Care Providers     Provider Role Specialty Phone number    Surya Delaney MD Referring Internal Medicine [phone]

## 2021-06-09 NOTE — PROGRESS NOTES
Patient would like to be contacted via the following phone number [phone]    ASSESSMENT:  1.  Right greater trochanteric bursitis  Helen reports that pain improved considerably after her injection at last visit    2.  Chronic diastolic heart failure:  Furosemide was increased from 20 to 40 mg daily at last visit.  He states she has essentially no edema of the left leg, still some of the right aggravated by deep in the leg in a dependent position for prolonged period of time.  Is no orthopnea or PND.  BNP from last visit was reviewed.  I would advise continuing the 40 mg of furosemide.  Monitoring labs should be checked.    3.  Low back pain:  She reports a flare of low back pain since last visit which was nonradicular.  This now is improving.  She should avoid NSAIDs due to chronic warfarin use.  He has used tramadol in the past at bedtime, but sometimes feels it may be too strong.  It aided in half and using with Tylenol was advised    4.  History of chronic atrial fibrillation:  She remains on warfarin    Preventive Health Care: Up-to-date on immunizations    PLAN:  1.  Use local heat and exercise program for chronic back pain    2.  Tylenol 1-2 tabs 3 times daily as needed for pain.  You may add tramadol 25 mg at bedtime for flares of pain    3.  Continue furosemide 40 mg daily along with potassium supplement.    4.  Basic metabolic panel at the time of the next INR    5.  See in 3 months or as needed    6.  Use compressive stockings to the knees with prolonged sitting or standing.      CHIEF COMPLAINT:  Chief Complaint   Patient presents with     Follow-up     right hip       HISTORY OF PRESENT ILLNESS:  Helen is a 89 y.o. female calling in to the clinic today with multiple concerns.  Reports that hip pain from last visit has improved after the steroid injection.  She did have a flare of nonradicular back pain since then.  She used Tylenol at night.-Since she is on warfarin.  Wonders about long-term  management    Her furosemide dose was increased from 20 to 40 mg daily at last visit.  BNP level was 251.  This is in a similar range to previous checks.  Reports left ankle swelling has largely resolved with a higher furosemide dose.  Still has some edema on the right ankle with prolonged sitting or standing.  Se does have a compressive stockings.  She denies current orthopnea or PND but tires easily with activity    REVIEW OF SYSTEMS:    All other systems are negative.    PFSH:  .  Lives independently in her own home.  Still drives a car    TOBACCO USE:  Social History     Tobacco Use   Smoking Status Never Smoker   Smokeless Tobacco Never Used       VITALS:  Stated Blood Pressure not measured  Stated Pulse not measured  Stated Weight not measured    PHYSICAL EXAM:  (observations via phone)  Alert pleasant talkative woman who does not sound in acute distress.  Affect is appropriate        The visit lasted a total of 23 minutes   CA intake time  3 minutes    Telephone Consent was completed by  staff and confirmed by SA    I have reviewed and updated the patient's Past Medical History, Social History, Family History as appropriate.    MEDICATIONS: Reviewed and updated per CA and MD  Current Outpatient Medications   Medication Sig Dispense Refill     biotin 5,000 mcg TbDL Take by mouth.       cholecalciferol, vitamin D3, 1,000 unit tablet Take 1,000 Units by mouth daily.       clindamycin (CLEOCIN) 300 MG capsule Prior to dental appointments  0     COUMADIN 1 mg tablet Take 1 to 2 tablets (1 to 2 mg) by mouth daily. Adjust dose based on INR results as directed.(uses in conjunction with 2.5 mg tabs). 180 tablet 1     COUMADIN 2.5 mg tablet TAKE ONE-HALF TO ONE TABLET (1.25 MG TO 2.5 MG) BY MOUTH DAILY. ADJUST DOSE BASED ON INR RESULTS AS DIRECTED. 60 tablet 1     cyanocobalamin 1000 MCG tablet Take 1,000 mcg by mouth daily.       furosemide (LASIX) 40 MG tablet Take 1 tablet (40 mg total) by mouth  "daily. 90 tablet 3     losartan (COZAAR) 25 MG tablet Take 1 tablet (25 mg total) by mouth daily. 90 tablet 3     metoprolol tartrate (LOPRESSOR) 25 MG tablet Take 1 tablet (25 mg total) by mouth 2 (two) times a day. 180 tablet 3     multivitamin with minerals (THERA-M) 9 mg iron-400 mcg Tab tablet Take 1 tablet by mouth daily.       pantoprazole (PROTONIX) 40 MG tablet TAKE ONE TABLET BY MOUTH ONE TIME DAILY 90 tablet 3     potassium chloride (KLOR-CON M10) 10 MEQ tablet Take 2 tablets (20 mEq total) by mouth daily. 180 tablet 3     traMADol (ULTRAM) 50 mg tablet Take 1 tablet (50 mg total) by mouth every 6 (six) hours as needed for pain. 30 tablet 0     Current Facility-Administered Medications   Medication Dose Route Frequency Provider Last Rate Last Dose     denosumab 60 mg (PROLIA 60 mg/ml)  60 mg Subcutaneous Q6 Months Eugene Lopez, PharmD   60 mg at 08/27/19 1116           The patient has been notified of following:     \"This telephone visit will be conducted via a call between you and your physician/provider. We have found that certain health care needs can be provided without the need for a physical exam.  This service lets us provide the care you need with a short phone conversation.  If a prescription is necessary we can send it directly to your pharmacy.  If lab work is needed we can place an order for that and you can then stop by our lab to have the test done at a later time.    If during the course of the call the physician/provider feels a telephone visit is not appropriate, you will not be charged for this service.\"    "

## 2021-06-10 NOTE — TELEPHONE ENCOUNTER
ANTICOAGULATION  MANAGEMENT    Assessment     Today's INR result of 2.1 is Therapeutic (goal INR of 2.0-3.0)        Warfarin taken as previously instructed    No new diet changes affecting INR    No new medication/supplements affecting INR    Continues to tolerate warfarin with no reported s/s of bleeding or thromboembolism     Previous INR was Therapeutic    Plan:     Spoke on phone with Helen regarding INR result and instructed:     Warfarin Dosing Instructions:  Continue current warfarin dose 1 mg daily on sun/thu; and 2 mg daily rest of week  (0 % change)    Instructed patient to follow up no later than: 3 weeks with leslie Mendez verbalizes understanding and agrees to warfarin dosing plan.    Instructed to call the [facility] for any changes, questions or concerns. (#[phone])   ?   Jack Curtis RN    Subjective/Objective:      Helen Beckham, a 89 y.o. female is on warfarin.     Helen reports:     Home warfarin dose: as updated on anticoagulation calendar per template     Missed doses: No     Medication changes:  No     S/S of bleeding or thromboembolism:  No     New Injury or illness:  No     Changes in diet or alcohol consumption:  No     Upcoming surgery, procedure or cardioversion:  No    Anticoagulation Episode Summary     Current INR goal:   2.0-3.0   TTR:   62.6 % (1 y)   Next INR check:   9/14/2020   INR from last check:   2.10 (8/24/2020)   Weekly max warfarin dose:      Target end date:      INR check location:      Preferred lab:      Send INR reminders to:   [facility]    Indications    Chronic atrial fibrillation (H) [I48.20]           Comments:            Anticoagulation Care Providers     Provider Role Specialty Phone number    Surya Delaney MD Referring Internal Medicine [phone]

## 2021-06-10 NOTE — TELEPHONE ENCOUNTER
ANTICOAGULATION  MANAGEMENT    Assessment     Today's INR result of 1.9 is Subtherapeutic (goal INR of 2.0-3.0)        Warfarin taken as previously instructed    No new diet changes affecting INR    No new medication/supplements affecting INR    Continues to tolerate warfarin with no reported s/s of bleeding or thromboembolism     Previous INR was Therapeutic    Plan:     Spoke on phone with Helen regarding INR result and instructed:     Warfarin Dosing Instructions:  Change warfarin dose to 1 mg daily on sun/thu; and 2 mg daily rest of week  (9 % change)    Instructed patient to follow up no later than: two weeks      Helen verbalizes understanding and agrees to warfarin dosing plan.    Instructed to call the [facility] for any changes, questions or concerns. (#[phone])   ?   Jack Curtis RN    Subjective/Objective:      Helen Beckham, a 89 y.o. female is on warfarin.     Helen reports:     Home warfarin dose: as updated on anticoagulation calendar per template     Missed doses: No     Medication changes:  No     S/S of bleeding or thromboembolism:  No     New Injury or illness:  No     Changes in diet or alcohol consumption:  No     Upcoming surgery, procedure or cardioversion:  No    Anticoagulation Episode Summary     Current INR goal:   2.0-3.0   TTR:   62.6 % (1 y)   Next INR check:   8/21/2020   INR from last check:   1.90! (8/7/2020)   Weekly max warfarin dose:      Target end date:      INR check location:      Preferred lab:      Send INR reminders to:   [facility]    Indications    Chronic atrial fibrillation (H) [I48.20]           Comments:            Anticoagulation Care Providers     Provider Role Specialty Phone number    Surya Delaney MD Referring Internal Medicine [phone]

## 2021-06-10 NOTE — PROGRESS NOTES
[facility] Progress Note    Assessment:  1.  Chronic atrial fibrillation with more recent documentation of slower ventricular response.  This prompted discontinuation of diltiazem and subsequent decrease of atenolol to 25 mg daily.  She is feeling well but has noted that her resting heart rate is higher perhaps as high as 100 bpm when she checks her blood pressure and pulse.  We are going to obtain a repeat Holter monitor so that we can document that she is not persistently significantly tachycardic and will comment once this is available for review.  In addition her last echocardiogram in 2014 reported left ventricular systolic function to be lower limits of normal and we are going to plan echocardiography.  She remains on warfarin which is her preference and she has a chads 2 vascular of 4.    2.  Essential hypertension.  Blood pressures have been under good control despite decreasing her blood pressure medications and she will continue to monitor.  It relates from [place] 2017 are reviewed.        Plan: As outlined above with plan follow-up in 4 months    1. Atrial fibrillation  Holter Monitor    Echo Complete   2. Chronic atrial fibrillation     3. Essential hypertension with goal blood pressure less than 140/90           An After Visit Summary was printed and given to the patient.    Subjective:    Helen Beckham is a 86 y.o. female who returned for a planned  follow up visit.  She reports feeling well and reports that she has been more active.  She monitors her blood pressure and pulse regularly and has noted her pulses been higher since her medications have been decreased.  She reports that typically her heart rate will be in the 100 range when she is resting.  As outlined she has had chronic atrial fibrillation and required a combination of medications for rate control.  Previous Holter monitors demonstrated rapid ventricular response and was on diltiazem and atenolol to achieve adequate  rate control.  However more recently she had fairly sudden onset of symptoms of shortness of breath with minimal activity and was found to be more prominently bradycardic.  Diltiazem was discontinued.  The atenolol was decreased to 25 mg daily secondary to persistence of some bradycardia on the most recent Holter monitor.    She denies chest pain, shortness of breath, orthopnea, PND, dizziness or lightheadedness.  She notes perhaps mild shortness of breath when she climbs stairs.    Review of Systems:   General: WNL  Eyes: WNL  Ears/Nose/Throat: WNL  Lungs: WNL  Heart: Irregular Heartbeat, Shortness of Breath with activity  Stomach: WNL  Bladder: WNL  Muscle/Joints: WNL  Skin: WNL  Nervous System: WNL  Mental Health: WNL     Blood: WNL      Problem List:    Patient Active Problem List   Diagnosis     Hyperlipidemia     Osteoporosis     Essential hypertension     Chronic atrial fibrillation     Esophageal Reflux     Diverticulosis     Osteoarthritis       Social History     Social History     Marital status:      Spouse name: N/A     Number of children: 2     Years of education: N/A     Occupational History     Not on file.     Social History Main Topics     Smoking status: Never Smoker     Smokeless tobacco: Never Used     Alcohol use No     Drug use: No     Sexual activity: Not Currently     Partners: Male     Other Topics Concern     Not on file     Social History Narrative    She is .  She lives alone independently.  Her son Nigel is heavily involved in her care.  She has grandchildren and great-grandchildren.       Family History   Problem Relation Age of Onset     Heart disease Mother      No Medical Problems Father      COPD Sister      Kidney failure Brother      Liver disease Brother      No Medical Problems Son      No Medical Problems Daughter        Current Outpatient Prescriptions   Medication Sig Dispense Refill     atenolol (TENORMIN) 25 MG tablet Take 1 tablet (25 mg total) by mouth  "daily. 90 tablet 3     furosemide (LASIX) 40 MG tablet Take 40 mg by mouth daily.       pantoprazole (PROTONIX) 40 MG tablet TAKE 1 TABLET (40 MG) BY MOUTH DAILY 90 tablet 3     warfarin (COUMADIN) 2.5 MG tablet Take 0.5- 1 tablets (1.25 to 2.5 mg) by mouth daily. Adjust dose based on INR results as directed. 90 tablet 3     Current Facility-Administered Medications   Medication Dose Route Frequency Provider Last Rate Last Dose     denosumab 60 mg (PROLIA 60 mg/ml)  60 mg Subcutaneous Q6 Months Surya Delaney MD   60 mg at 01/23/17 1251       Objective:     /82 (Patient Site: Right Arm, Patient Position: Sitting, Cuff Size: Adult Small)  Pulse 90  Resp 16  Ht 5' 2\" (1.575 m)  Wt 136 lb (61.7 kg)  SpO2 98%  BMI 24.87 kg/m2  136 lb (61.7 kg)       Physical Exam:    GENERAL APPEARANCE: alert, no apparent distress  HEENT: no scleral icterus or xanthelasma  NECK: jugular venous pressure within normal limits on today's examination  CHEST: symmetric, the lungs are clear to auscultation  CARDIOVASCULAR: Irregular rhythm heart rates in the  bpm range, no significant murmur; no carotid bruits  Abdomen: No Organomegaly, masses, bruits, or tenderness. Bowels sounds are present      EXTREMITIES: no cyanosis, clubbing, mild lower extremity edema    Cardiac Testing:  HOLTER REPORT     INTERPRETATION DATE: 02/13/2017     INTERPRETATION: Predominant rhythm is atrial fibrillation, which is present  throughout the monitoring period. The patient did have slow ventricular response  with heart rates ranging from 33 beats per minute to 88 beats per minute, averaging  only 46 beats per minute. Average resting heart rate appeared to be approximately  40 beats per minute. Ventricular response with activity in the early morning hours  ranged up to 70 beats per minute. Longest R to R interval was 2 seconds. Rare  ventricular premature beats were noted, 36 over 24 hours. There was 1 ventricular  couplet. There were several " short runs of what appeared to be an accelerated  idioventricular rhythm, rate 88 beats per minute for 2 and 3 seconds at a time     CONCLUSION: Persistent atrial fibrillation with slow ventricular response with  heart rates commonly in the 40s during daytime hours. Clinical correlation advised.        JEAN MARIE Garcia 02/13/2017 16:57:26  T 02/13/2017 17:34:32  R 02/13/2017 19:30:41  [id number]      Lab Results:    Lab Results   Component Value Date     02/06/2017    K 4.2 02/06/2017     (H) 02/06/2017    CO2 21 (L) 02/06/2017    BUN 27 02/06/2017    CREATININE 0.99 02/06/2017    CALCIUM 9.2 02/06/2017     Lab Results   Component Value Date    CHOL 220 (H) 01/23/2017    TRIG 124 01/23/2017    HDL 49 (L) 01/23/2017     BNP (pg/mL)   Date Value   02/06/2017 546 (H)   12/07/2015 235 (H)   10/12/2015 298 (H)     Creatinine (mg/dL)   Date Value   02/06/2017 0.99   01/23/2017 0.82   09/14/2016 0.92   04/14/2016 0.95     LDL Calculated (mg/dL)   Date Value   01/23/2017 146 (H)   08/05/2016 163 (H)   07/20/2015 123     Lab Results   Component Value Date    WBC 6.5 01/23/2017    WBC 7.2 04/20/2015    HGB 13.7 01/23/2017    HCT 40.3 01/23/2017    MCV 87 01/23/2017     01/23/2017               This note has been dictated using voice recognition software. Any grammatical or context distortions are unintentional and inherent to the software.      Aaron Mathew M.D., F.A.C.C.  [facility], [place]  [phone]

## 2021-06-10 NOTE — TELEPHONE ENCOUNTER
Medication Question or Clarification  Who is calling: Patient  What medication are you calling about (include dose and sig)?: furosemide (LASIX) 40 MG tablet  Who prescribed the medication?: Surya Delaney MD  What is your question/concern?: Pt states she is very tired, and sleepy on this medication. Pt states she can not walk arounf in a daze all day, she ha been taking medication for a long time but needs an alternative.  Pharmacy on file, please advise.  [facility]  Okay to leave a detailed message?: Yes

## 2021-06-10 NOTE — PATIENT INSTRUCTIONS - HE
1.  Continue current medications.    2.  Clinic follow-up in 2 to 3 months.    3.  Flu shot this fall.

## 2021-06-10 NOTE — TELEPHONE ENCOUNTER
Stop furosemide.  Start Torsemide 10 mg daily.  Continue current potassium supplement.  See in 1 month

## 2021-06-10 NOTE — TELEPHONE ENCOUNTER
She could try a different diuretic, but they are all structurally similar.  I doubt that switching diuretics would make a difference in terms of fatigue.  Let me know if she wishes to try a different one.  If she does switch, I then would need to see her back in several weeks to recheck lab tests.

## 2021-06-10 NOTE — PROGRESS NOTES
Patient would like to be contacted via the following phone number [phone]     ASSESSMENT:  1.  Congestive heart failure with preserved ejection fraction:  Helen reports that her weight has increased gradually over the past few months.  She attributes this more to dietary indiscretion and fluid buildup.  She does not have orthopnea or PND.  She has minor ankle swelling on the right none on the left.  She will continue her current management.    2.  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  He has not had symptomatic palpitations.  She is on warfarin as an anticoagulant    3.  Low back pain:  She reports that low back pain and hip pain noted at last visit have improved        Preventive Health Care: This will be reviewed at her next in clinic appointment    PLAN:  1.  Continue current medications.    2.  See in 3 months or as needed          CHIEF COMPLAINT:  Chief Complaint   Patient presents with     Follow-up       HISTORY OF PRESENT ILLNESS:  Helen is a 89 y.o. female contacting the clinic today via phone for follow-up of congestive heart failure with preserved ejection fraction, chronic atrial fibrillation, and other concerns.  Reports that overall she has done well since last visit.  She acknowledges that she gets winded easier and is less active and then in the past.  However, she looks forward to staying up to watch hockey games.  She has minor ankle edema on the right, none on the left.  She notes some shortness of breath with stairs and walking, but no orthopnea or PND.  Ejection fraction was 52% on her last echo.  Remains on furosemide 40 mg daily.    Has a history of chronic atrial fibrillation.  She is on warfarin as an anticoagulant.  She has an ICD in place.  She is on metoprolol tartrate 25 mg twice daily.  Also on losartan 25 mg daily    Had complained of discomfort in the low back and hip at last visit.  This has considerably improved    REVIEW  "OF SYSTEMS:     All other systems are negative.    PFSH:  Social History     Social History Narrative    She is .  She lives alone independently.  Her son Nigel is heavily involved in her care.  She has grandchildren and great-grandchildren.     .  Lives independently.  She reports that her friend Laurie Kendrick recently     TOBACCO USE:  Social History     Tobacco Use   Smoking Status Never Smoker   Smokeless Tobacco Never Used       VITALS:  There were no vitals filed for this visit.  Wt Readings from Last 3 Encounters:   20 135 lb (61.2 kg)   20 130 lb (59 kg)   20 132 lb (59.9 kg)       PHYSICAL EXAM:  (observations via Phone)  Alert pleasant talkative woman with appropriate affect.  She does not sound in acute distress  1    Phone Start time:  1:08  Phone End time:  1:20    The visit lasted a total of 12 minutes     CA Intake Time: 4    I have reviewed and updated the patient's Past Medical History, Social History, Family History as appropriate.    MEDICATIONS: Reviewed and updated per CA and MD Surya Delaney MD  The patient has been notified of following:     \"This telephone visit will be conducted via a call between you and your physician/provider. We have found that certain health care needs can be provided without the need for a physical exam.  This service lets us provide the care you need with a short phone conversation.  If a prescription is necessary we can send it directly to your pharmacy.  If lab work is needed we can place an order for that and you can then stop by our lab to have the test done at a later time.    Telephone visits are billed at different rates depending on your insurance coverage. During this emergency period, for some insurers they may be billed the same as an in-person visit.  Please reach out to your insurance provider with any questions.    If during the course of the call the physician/provider feels a telephone visit is not appropriate, you " "will not be charged for this service.\"    Patient has given verbal consent to a Telephone visit? Yes    Patient would like to receive their AVS by AVS Preference: Mail a copy.    Cata Marroquin CMA      "

## 2021-06-11 NOTE — TELEPHONE ENCOUNTER
Medication Question or Clarification  Who is calling: patient   What medication are you calling about (include dose and sig)?:    Disp  Refills  Start  End     COUMADIN 1 mg tablet  180 tablet  1  9/14/2020      Sig: Take as directed  by mouth daily. Adjust dose based on INR results as directed.     Sent to pharmacy as: Coumadin 1 mg tablet     E-Prescribing Status: Receipt confirmed by pharmacy (9/14/2020  3:41 PM CDT)        Who prescribed the medication?: Surya Delaney MD   What is your question/concern?: Per pharmacy notes; needs  Clarification on route.   [facility]  Okay to leave a detailed message?: Yes

## 2021-06-11 NOTE — TELEPHONE ENCOUNTER
Medication Question or Clarification  Who is calling: patient  What medication are you calling about (include dose and sig)?:     torsemide (DEMADEX) 10 MG tablet  30 tablet  11  8/21/2020      Sig - Route: Take 1 tablet (10 mg total) by mouth daily. - Oral       Who prescribed the medication?: Surya Delaney MD  What is your question/concern?: Reports this medication is making her urinate more frequently. Stopped taking the medication this morning. Wants to take Furosemide instead. Please advise and call patient.  [facility]ed [facility]  Okay to leave a detailed message?: Yes

## 2021-06-11 NOTE — TELEPHONE ENCOUNTER
Refill Approved    Rx renewed per Medication Renewal Policy. Medication was last renewed on 3/29/19.    Beena Stephen, [facility] Triage/Med Refill 9/11/2020     Requested Prescriptions   Pending Prescriptions Disp Refills     metoprolol tartrate (LOPRESSOR) 25 MG tablet [Pharmacy Med Name: Metoprolol Tartrate Oral Tablet 25 MG] 180 tablet 0     Sig: Take 1 tablet by mouth 2 times a day.       Beta-Blockers Refill Protocol Passed - 9/9/2020 11:44 AM        Passed - PCP or prescribing provider visit in past 12 months or next 3 months     Last office visit with prescriber/PCP: 5/18/2020 Surya Delaney MD OR same dept: 5/18/2020 Surya Delaney MD OR same specialty: 5/18/2020 Surya Delaney MD  Last physical: Visit date not found Last MTM visit: Visit date not found   Next visit within 3 mo: Visit date not found  Next physical within 3 mo: Visit date not found  Prescriber OR PCP: Surya Delaney MD  Last diagnosis associated with med order: 1. Chronic atrial fibrillation (H)  - metoprolol tartrate (LOPRESSOR) 25 MG tablet [Pharmacy Med Name: Metoprolol Tartrate Oral Tablet 25 MG]; Take 1 tablet by mouth 2 times a day.  Dispense: 180 tablet; Refill: 0    If protocol passes may refill for 12 months if within 3 months of last provider visit (or a total of 15 months).             Passed - Blood pressure filed in past 12 months     BP Readings from Last 1 Encounters:   05/18/20 120/80

## 2021-06-11 NOTE — TELEPHONE ENCOUNTER
ANTICOAGULATION  MANAGEMENT    Assessment     Today's INR result of 3.3 is Supratherapeutic (goal INR of 2.0-3.0)        Warfarin taken as previously instructed    No new diet changes affecting INR    No new medication/supplements affecting INR    Continues to tolerate warfarin with no reported s/s of bleeding or thromboembolism     Previous INR was Therapeutic    Plan:     Spoke on phone with Helen regarding INR result and instructed:     Warfarin Dosing Instructions:  hold today then continue current warfarin dose 1 mg daily on sun/thu; and 2 mg daily rest of week  (0 % change)    Instructed patient to follow up no later than: two weeks    Helen verbalizes understanding and agrees to warfarin dosing plan.    Instructed to call the [facility] for any changes, questions or concerns. (#[phone])   ?   Jack Curtis RN    Subjective/Objective:      Helen Beckham, a 89 y.o. female is on warfarin.     Helen reports:     Home warfarin dose: verbally confirmed home dose with Helen and updated on anticoagulation calendar     Missed doses: No     Medication changes:  No     S/S of bleeding or thromboembolism:  No     New Injury or illness:  No     Changes in diet or alcohol consumption:  No     Upcoming surgery, procedure or cardioversion:  No    Anticoagulation Episode Summary     Current INR goal:   2.0-3.0   TTR:   65.0 % (1 y)   Next INR check:   10/13/2020   INR from last check:   3.30! (9/29/2020)   Weekly max warfarin dose:      Target end date:      INR check location:      Preferred lab:      Send INR reminders to:   FARNAZ MIDWAY    Indications    Chronic atrial fibrillation (H) [I48.20]           Comments:            Anticoagulation Care Providers     Provider Role Specialty Phone number    Surya Delaney MD Referring Internal Medicine [phone]

## 2021-06-11 NOTE — PROGRESS NOTES
ASSESSMENT:  1.  Diastolic congestive heart failure:  Helen had requested a change from furosemide to an alternative diuretic since she felt the furosemide made her feel tired.  She is now on torsemide.  She still notes some issues with urinary frequency.  She has some mild peripheral edema right leg greater than left which has not changed greatly with the change in medications.  Monitoring labs will be checked.  She will continue on the current torsemide dose.    2.  Chronic atrial fibrillation  She has on metoprolol 25 mg twice daily for rate control.  She remains on warfarin as an anticoagulant    3.  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  Osteoporosis:  On Prolia.  DEXA scan from November 2019 revealed a lowest T score of -3.7 in the left 33% radius.  She had experienced a significant increase in bone density in both hips since 2017.  PLAN:  1.  Check basic metabolic panel due to the change in diuretic.    2.  Continue torsemide 10 mg daily.  Also continue potassium to 10 mEq tab daily pending tab test results.    3.  Flu shot this fall.    4.  See in 3 months or as needed    Orders Placed This Encounter   Procedures     Basic Metabolic Panel     Medications Discontinued During This Encounter   Medication Reason     COUMADIN 1 mg tablet Reorder       Return in about 3 months (around 12/14/2020) for Recheck.    ASSESSED PROBLEMS:  1. Diuretic-induced hypokalemia  Basic Metabolic Panel   2. Persistent atrial fibrillation (H)  DISCONTINUED: COUMADIN 1 mg tablet   3. Chronic atrial fibrillation (H)  INR   4. Diastolic CHF, chronic (H)     5. ICD (implantable cardioverter-defibrillator), single, in situ         CHIEF COMPLAINT:  Chief Complaint   Patient presents with     Medication Management       HISTORY OF PRESENT ILLNESS:  Helen is a 89 y.o. female seen for follow-up of congestive heart failure with preserved ejection fraction.  After last visit, she called  "complaining of feeling poorly which she attributed to furosemide.  After discussion, she was changed to torsemide 10 mg daily.  She still notes urinary frequency with this, but is tolerating the medication.  She has mild peripheral edema right leg greater than left which is not much different than what she experienced on furosemide.  There is no orthopnea or PND.  She acknowledges that she is less active than in the past.  She does tire easier with activity.    She otherwise feels well.  She remains on Prolia for osteoporosis.    She is on metoprolol for rate control with atrial fibrillation.  She is on warfarin as an anticoagulant.  She has an ICD due to a history of torsade de pointe    REVIEW OF SYSTEMS:    Comprehensive review of systems is otherwise negative.    PFSH:  .  Lives independently in her own home.    TOBACCO USE:  Social History     Tobacco Use   Smoking Status Never Smoker   Smokeless Tobacco Never Used       VITALS:  Vitals:    09/14/20 1509   BP: 120/64   Patient Site: Right Arm   Patient Position: Sitting   Cuff Size: Adult Regular   Pulse: 70   Resp: 18   SpO2: 98%   Weight: 139 lb 8 oz (63.3 kg)   Height: 5' 0.5\" (1.537 m)     Wt Readings from Last 3 Encounters:   09/14/20 139 lb 8 oz (63.3 kg)   05/18/20 135 lb (61.2 kg)   05/12/20 130 lb (59 kg)       PHYSICAL EXAM:  Constitutional:   Reveals an alert pleasant talkative woman who does not seem in acute distress.   Vitals: per nursing notes.  HEENT: Atraumatic  Eyes:  EOMs full, PERRL.  Oropharynx:   Mouth and throat clear, no thrush or exudate.  Neck:  Supple, no carotid bruits or adenopathy.  Back:  No spine or CVA pain.  Mild thoracic kyphosis  Thorax:  No bony deformities.  Defibrillator left chest  Lungs: Clear to A&P without rales or wheezes.  Respiratory effort normal.  Cardiac:   Irregularly irregular  rate and rhythm, normal S1, S2, no murmur or gallop.  Abdomen:  Soft, active bowel sounds without bruits, mass, or " tenderness..  Extremities: Trace to 1+ edema right lower leg with lesser edema left.  Mild stasis changes bilaterally, pulses in the feet intact.    Skin:  No jaundice, peripheral cyanosis or lesions to suggest malignancy.  Neuro:  Alert and oriented.   No gross focal deficits.  Psychiatric:  Memory intact, mood appropriate.    DATA REVIEWED:  Additional History from Old Records Summarized (2): None.  Decision to Obtain Records (1): None.   Radiology Tests Summarized or Ordered (1): None.  Labs Reviewed or Ordered (1): BMP ordered.  Medicine Test Summarized or Ordered (1): Last cardiac echo was reviewed  Independent Review of EKG or X-RAY(2 each): None.    The visit lasted a total of 25 minutes face to face with the patient. Over 50% of the time was spent counseling and educating the patient about management of congestive heart failure with preserved ejection fraction.    Dragon dictation was used for this note. Speech recognition errors are a possibility.     MEDICATIONS:  Current Outpatient Medications   Medication Sig Dispense Refill     biotin 5,000 mcg TbDL Take by mouth.       cholecalciferol, vitamin D3, 1,000 unit tablet Take 1,000 Units by mouth daily.       clindamycin (CLEOCIN) 300 MG capsule Prior to dental appointments  0     COUMADIN 2.5 mg tablet TAKE ONE-HALF TO ONE TABLET (1.25 MG TO 2.5 MG) BY MOUTH DAILY. ADJUST DOSE BASED ON INR RESULTS AS DIRECTED. 60 tablet 1     cyanocobalamin 1000 MCG tablet Take 1,000 mcg by mouth daily.       losartan (COZAAR) 25 MG tablet Take 1 tablet (25 mg total) by mouth daily. 90 tablet 3     metoprolol tartrate (LOPRESSOR) 25 MG tablet Take 1 tablet by mouth 2 times a day. 180 tablet 3     multivitamin with minerals (THERA-M) 9 mg iron-400 mcg Tab tablet Take 1 tablet by mouth daily.       pantoprazole (PROTONIX) 40 MG tablet TAKE ONE TABLET BY MOUTH ONE TIME DAILY 90 tablet 3     potassium chloride (KLOR-CON M10) 10 MEQ tablet Take 2 tablets (20 mEq total) by mouth  daily. 180 tablet 3     torsemide (DEMADEX) 10 MG tablet Take 1 tablet (10 mg total) by mouth daily. 30 tablet 11     traMADol (ULTRAM) 50 mg tablet Take 1 tablet (50 mg total) by mouth every 6 (six) hours as needed for pain. 30 tablet 0     COUMADIN 1 mg tablet Take 1 mg by mouth daily on Sunday and Thursday; and 2 mg by mouth daily the rest of the week.  Adjust dose based on INR results as directed. 180 tablet 1     No current facility-administered medications for this visit.

## 2021-06-11 NOTE — PATIENT INSTRUCTIONS - HE
1.  Continue Torsemide 10 mg daily.    2.  Continue potassium, two 10 meq tabs dily    3.  See in three months or as needed.    4.  Flu shot this fall

## 2021-06-11 NOTE — TELEPHONE ENCOUNTER
ANTICOAGULATION  MANAGEMENT    Assessment     Today's INR result of 2.1 is Therapeutic (goal INR of 2.0-3.0)        Warfarin taken as previously instructed    No new diet changes affecting INR    No new medication/supplements affecting INR    Continues to tolerate warfarin with no reported s/s of bleeding or thromboembolism     Previous INR was Therapeutic    Plan:     Spoke on phone with Helen regarding INR result and instructed:     Warfarin Dosing Instructions:  Continue current warfarin dose 1 mg daily on sun/thu; and 2 mg daily rest of week  (0 % change)    Instructed patient to follow up no later than: two weeks    Helen verbalizes understanding and agrees to warfarin dosing plan.    Instructed to call the [facility] for any changes, questions or concerns. (#[phone])   ?   Jack Curtis RN    Subjective/Objective:      Helen Beckham, a 89 y.o. female is on warfarin.     Helen reports:     Home warfarin dose: as updated on anticoagulation calendar per template     Missed doses: No     Medication changes:  No     S/S of bleeding or thromboembolism:  No     New Injury or illness:  No     Changes in diet or alcohol consumption:  No     Upcoming surgery, procedure or cardioversion:  No    Anticoagulation Episode Summary     Current INR goal:   2.0-3.0   TTR:   66.0 % (1 y)   Next INR check:   9/28/2020   INR from last check:   2.10 (9/14/2020)   Weekly max warfarin dose:      Target end date:      INR check location:      Preferred lab:      Send INR reminders to:   FARNAZ MIDWAY    Indications    Chronic atrial fibrillation (H) [I48.20]           Comments:            Anticoagulation Care Providers     Provider Role Specialty Phone number    Surya Delaney MD Referring Internal Medicine [phone]

## 2021-06-11 NOTE — PROGRESS NOTES
[facility] HEART [facility]   Arrhythmia Clinic    Assessment/Plan:  Diagnoses and all orders for this visit:    Chronic atrial fibrillation with RVR today and on holter recently.  Currently taking atenolol 25 mg 1 tab at night and a half a tablet in the morning.  Noting heart rates are in the 100s for over a month as checking nightly.  She tells me that she is on the dose that I put her on but there have been some changes since I saw her in February.  She initially had bradycardia when she saw Dr. Mathew and he discontinued diltiazem on 2/8/2017.  I saw her about 10 days later and still having some bradycardia so I decreased her atenolol dose.  Obviously she is now having A. fib with RVR.  A. fib is chronic.  To continue nightly blood pressure and pulse checks and call me in 2 weeks with readings.  To stop atenolol and go to metoprolol succinate 50 mg 1 tab at bedtime.  She is refusing to take more beta-blocker at daytime.  She denies any problems on Cartia and if fatigue is an issue  with increased beta-blocker dose I could switch her to Cartia.  [id number] score of 4 and on chronic warfarin.  INRs have been stable.  To follow-up with Dr. Mathew in 3 months.  Anticipate doing 24-hour Holter once I know that heart rates are controlled on home checks at night.  -     metoprolol succinate (TOPROL XL) 50 MG 24 hr tablet; Take 1 tablet (50 mg total) by mouth daily.  Dispense: 30 tablet; Refill: 1    Essential hypertension with goal blood pressure less than 130/80 and well-controlled.    Other orders  -     clindamycin (CLEOCIN) 300 MG capsule; ; Refill: 0    ______________________________________________________________________    Subjective:    I had the opportunity to see Helen Beckham at the [facility]. Helen Beckham is a 86 y.o. female and here for urgent EP follow-up due to atrial fib with RVR noted on follow-up with Dr. Mathew on 4/19/2017 and previously had been bradycardic in atrial fib in February  of this year.  Helen Beckham has a known history of chronic atrial fibrillation and is asymptomatic and on rate control.  She is in good health overall.  She tells me that she stubbed her toe at night and broke it.  She is walking with a cane now due to this.  Otherwise she denies any cardiac symptoms and is pleasant.  She is independent in ADLs and takes her own medications.  She wore a Holter recently and heart rate is average of 99 in A. fib over 24 hours.  She tells me that she was on atenolol 25 mg p.o. daily at the time.  This was increased to 12. 5 milligrams in the morning and continued with 25 milligrams at night.  She had more fatigue when she was taking atenolol in the morning.  ______________________________________________________________________    Problem List:  Patient Active Problem List   Diagnosis     Hyperlipidemia     Osteoporosis     Essential hypertension     Chronic atrial fibrillation     Esophageal Reflux     Diverticulosis     Osteoarthritis     Medical History:  Past Medical History:   Diagnosis Date     Cellulitis of third toe of right foot      Diverticulitis      Esophageal reflux      Essential hypertension     Created by Conversion  Replacement Utility updated for latest IMO load     Hyperlipidemia      Osteoporosis      Permanent atrial fibrillation      [id number] score of  5 with 2 points for age and 1 each for female, HTN and CHF---on chronic warfarin afib with RVR 3/26/14 new amio start.  4/24/15 off amio for ? Hair loss and fatigue 6/15 inpersistent afib and asymptomatic and switched to rate control      Trochanteric bursitis     osteoarthritis     Venous insufficiency      Surgical History:  Past Surgical History:   Procedure Laterality Date     EYE SURGERY      bilateral cataract surgery     FRACTURE SURGERY      left toe     JOINT REPLACEMENT       [place] CARDIOVERSION ELECTIVE ARRHYTHMIA EXTERNAL      Description: Elective Cardioversion External;  Recorded: 05/19/2014;   Comments: 4/28/14     [place] EXCISE HAND/FOOT NEUROMA      Description: Excision Of Neuroma Of Right Foot;  Proc Date: 04/01/2005;     [place] RECONSTR TOTAL SHOULDER IMPLANT      Description: Shoulder Arthroplasty Total Shoulder Replacement;  Proc Date: 07/13/2011;  Comments: REVERSE TOTAL SHOULDER     TONSILLECTOMY       Social History:  Social History   Substance Use Topics     Smoking status: Never Smoker     Smokeless tobacco: Never Used     Alcohol use No        Review of Systems: Review of Systems:   General: WNL  Eyes: WNL  Ears/Nose/Throat: WNL  Lungs: WNL  Heart: Irregular Heartbeat  Stomach: WNL  Bladder: WNL  Muscle/Joints: WNL  Skin: WNL  Nervous System: WNL  Mental Health: WNL     Blood: WNL      Family History:  Family History   Problem Relation Age of Onset     Heart disease Mother      No Medical Problems Father      COPD Sister      Kidney failure Brother      Liver disease Brother      No Medical Problems Son      No Medical Problems Daughter          Allergies:  Allergies   Allergen Reactions     Atorvastatin      Bee Sting Kit      Codeine      Other Allergy (See Comments)      Contrast Media Ready-Box MISC, 04/28/2008.       Penicillins      Simvastatin Myalgia     Medications:  Current Outpatient Prescriptions   Medication Sig Dispense Refill     clindamycin (CLEOCIN) 300 MG capsule   0     furosemide (LASIX) 40 MG tablet Take 40 mg by mouth daily.       pantoprazole (PROTONIX) 40 MG tablet TAKE 1 TABLET (40 MG) BY MOUTH DAILY 90 tablet 3     potassium chloride SA (K-DUR,KLOR-CON) 10 MEQ tablet Take 1 tablet (10 mEq total) by mouth 2 (two) times a day. 180 tablet 3     warfarin (COUMADIN) 2.5 MG tablet Take 0.5- 1 tablets (1.25 to 2.5 mg) by mouth daily. Adjust dose based on INR results as directed. 90 tablet 3     metoprolol succinate (TOPROL XL) 50 MG 24 hr tablet Take 1 tablet (50 mg total) by mouth daily. 30 tablet 1     Current Facility-Administered Medications   Medication Dose Route Frequency  "Provider Last Rate Last Dose     denosumab 60 mg (PROLIA 60 mg/ml)  60 mg Subcutaneous Q6 Months Surya Delaney MD   60 mg at 01/23/17 1251       Objective:   Vital signs:  /80 (Patient Site: Left Arm, Patient Position: Sitting, Cuff Size: Adult Regular)  Pulse 100  Resp 16  Ht 5' 2\" (1.575 m)  Wt 137 lb (62.1 kg)  BMI 25.06 kg/m2      Physical Exam:    GENERAL APPEARANCE: Alert, cooperative and in no acute distress.  HEENT: No scleral icterus. No Xanthelasma. Oral mucuos membranes pink and moist.  NECK: JVP Nl. No Hepatojugular reflux. Thyroid not visualized  CHEST: clear to auscultation  CARDIOVASCULAR: S1, S2 without murmur ,clicks or rubs. Regular, regular.  Radial and posterior tibial pulses are intact and symetric. No carotid bruits noted.  ABDOMEN: Nontender. BS+. No bruits.  EXTREMITIES: No cyanosis, clubbing or edema.    Results personally reviewed:  5/1/2016 24-hour Holter shows A. fib throughout with right bundle branch block.  Average ventricular response 99.    Results for orders placed during the hospital encounter of 05/01/17   Echo Complete [ECH10] 05/01/2017    Narrative   Left ventricle ejection fraction is mildly decreased. The calculated   left ventricular ejection fraction is 54%.    Aortic Valve: The valve is tricuspid. Moderate regurgitation.    Left atrial volume is moderately increased.    Mild tricuspid valve regurgitation. No pulmonary hypertension present.           Results for orders placed during the hospital encounter of 07/09/14   NM Pharmacologic Stress Test    Narrative   These images appear to be of good quality.    They show no significant perfusion or reperfusion abnormalities.  Gated left   ventricular wall motion is overall hyperdynamic with a calculated ejection fraction greater than 80%.   There did not appear to be any obvious wall motion abnormalities.    SUMMARY:  1.  Subjectively and objectively negative regadenoson infusion per Dr. Duarte's note.  2.  " Regadenoson nuclear imaging does not suggest any ischemia or prior   scar.  3.  Hyperdynamic wall motion as mentioned above with ejection fraction   greater than  80%.  4.  No prior scan available for comparison.             Results for orders placed or performed in visit on 02/17/17   ECG Clinic Future   Result Value Ref Range    SYSTOLIC BLOOD PRESSURE  mmHg    DIASTOLIC BLOOD PRESSURE  mmHg    VENTRICULAR RATE 54 BPM    ATRIAL RATE 63 BPM    P-R INTERVAL  ms    QRS DURATION 118 ms    Q-T INTERVAL 486 ms    QTC CALCULATION (BEZET) 460 ms    P Axis  degrees    R AXIS 65 degrees    T AXIS -57 degrees    MUSE DIAGNOSIS       Atrial fibrillation with slow ventricular response  Incomplete right bundle branch block  Nonspecific ST and T wave abnormality  Prolonged QT  Abnormal ECG  When compared with ECG of 06-FEB-2017 13:45,  QT has lengthened  Confirmed by ELIA LION, JEAN MARIE LOC: ([id number]) on 2/17/2017 3:35:09 PM            TSH:   Lab Results   Component Value Date    TSH 1.92 02/08/2017     BNP:   Lab Results   Component Value Date     (H) 02/06/2017     BMP:  Lab Results   Component Value Date    CREATININE 0.99 02/06/2017    BUN 27 02/06/2017     02/06/2017    K 4.2 02/06/2017     (H) 02/06/2017    CO2 21 (L) 02/06/2017       This note has been dictated using voice recognition software. Any grammatical or context distortions are unintentional and inherent to the software.    GARCÍA WITT, RN, [facility] HEART [facility]  [phone]

## 2021-06-12 NOTE — PROGRESS NOTES
ASSESSMENT:  1.  Persistent atrial fibrillation: Helen had excessive bradycardia on the combination of diltiazem and a atenolol in the past.  Heart rate was higher than optimal by her Holter monitor in May with an average pulse of 99 on metoprolol.  Lanoxin was added at last visit rather than increasing the metoprolol dose since she complained of fatigue from metoprolol.  Heart rate now is about 80 at rest  2.  Diastolic heart failure.  He seems well compensated on her current furosemide dose.  Echocardiogram from May revealed an ejection fraction of 54%  PLAN:  1.  Check digoxin level  2.  Recheck 24 hour Holter monitor on current medications  3.  Continue digoxin 0.125 mg daily with metoprolol 75 mg in the p.m. for the present.  4.  She will be notified of test results.  Clinic follow-up in 3 months or as needed.  Flu shot this fall.      There are no discontinued medications.        ASSESSED PROBLEMS:  No diagnosis found.    CHIEF COMPLAINT:  Chief Complaint   Patient presents with     Follow-up     medication check/INR       HISTORY OF PRESENT ILLNESS:  Helen is a 86 y.o. female presenting to the clinic today for follow up for atrial fibrillation. She is concerned about her heart rate. Her pulse today is 72, but she is getting readings in the 80s at home. Her blood pressure is 126/66 and MD recheck is 140/82. She is currently taking metoprolol 75mg, digoxin 125mcg, and furosemide 40mg daily. She does feel a little loopy at the higher dose of metoprolol. She also feels tired. She denies heart flutter, breathing problems or change in exercise tolerance, or unusual ankle swelling. Her right ankle does swell routinely. She is wearing compression stockings daily. A Holter of 5/1/17 showed persistent atrial fibrillation and an average pulse of 99 with a high of 164.     REVIEW OF SYSTEMS:   All other systems are negative.    PFSH:    TOBACCO USE:  History   Smoking Status     Never Smoker   Smokeless Tobacco      "Never Used       VITALS:  Vitals:    08/22/17 1314 08/22/17 1334   BP: 128/66 140/82   Patient Site: Left Arm Right Arm   Patient Position: Sitting Sitting   Cuff Size: Adult Regular    Pulse: 72    Weight: 140 lb 5 oz (63.6 kg)    Height: 5' 2\" (1.575 m)      Wt Readings from Last 3 Encounters:   08/22/17 140 lb 5 oz (63.6 kg)   07/25/17 143 lb 3.2 oz (65 kg)   06/02/17 137 lb (62.1 kg)     Body mass index is 25.66 kg/(m^2).    PHYSICAL EXAM:  Constitutional:   Reveals an alert, pleasant, talkative woman. Affect appropriate. Does not seem acutely ill. Vitals: per nursing notes.  Repeat BP by MD: 140/82, right arm, sitting. Pulse 80s.  HEENT:  Ears:  External canals, TMs clear.    Eyes:  EOMs full, PERRL.  Oropharynx:   Mouth and throat clear, no thrush or exudate.  Neck:  Supple, no carotid bruits or adenopathy.  Back:  No spine or CVA pain.  Thorax:  No bony deformities.  Lungs: Clear to A&P.  Cardiac: Irregularly irregular rhythm. Rate 80s.   Abdomen:  Soft, non-tender.   Extremities: Compression stockings on. No significant edema..    Skin:  No abnormal pallor.     ADDITIONAL HISTORY SUMMARIZED (2): Reviewed cardiology notes of 4/19/17 and 6/2/17.  DECISION TO OBTAIN EXTRA INFORMATION (1): TAYLOR for Care Everywhere obtained.   RADIOLOGY TESTS (1): None.  LABS (1): Labs ordered.   MEDICINE TESTS (1): Reviewed Holter monitor results of 4/19/17.   INDEPENDENT REVIEW (2 each): None.     The visit lasted a total of 16 minutes face to face with the patient. Over 50% of the time was spent counseling and educating the patient about heart rate.    Eugene CENTENO, am scribing for and in the presence of, Dr. Delaney.    KAL CENTENO, personally performed the services described in this documentation, as scribed by Eugene Newton in my presence, and it is both accurate and complete.    Dragon dictation was used for this note.  Speech recognition errors are a possibility.    MEDICATIONS:  Current Outpatient Prescriptions "   Medication Sig Dispense Refill     clindamycin (CLEOCIN) 300 MG capsule   0     COUMADIN 2.5 mg tablet TAKE ONE-HALF TO ONE TABLET (1.25 MG TO 2.5 MG) BY MOUTH DAILY. ADJUST DOSE BASED ON INR RESULTS AS DIRECTED. 90 tablet 1     digoxin (LANOXIN) 125 mcg tablet Take 1 tablet (125 mcg total) by mouth daily. 30 tablet 11     furosemide (LASIX) 40 MG tablet Take 40 mg by mouth daily.       metoprolol succinate (TOPROL-XL) 50 MG 24 hr tablet TAKE ONE TABLET BY MOUTH ONE TIME DAILY  (Patient taking differently: TAKE ONE AND ONE HALF TABLET BY MOUTH ONE TIME DAILY. TOTALING 75MG) 90 tablet 1     pantoprazole (PROTONIX) 40 MG tablet TAKE 1 TABLET (40 MG) BY MOUTH DAILY 90 tablet 3     potassium chloride SA (K-DUR,KLOR-CON) 10 MEQ tablet Take 1 tablet (10 mEq total) by mouth 2 (two) times a day. 180 tablet 3     furosemide (LASIX) 20 MG tablet TAKE ONE TABLET BY MOUTH TWICE DAILY  90 tablet 3     Current Facility-Administered Medications   Medication Dose Route Frequency Provider Last Rate Last Dose     denosumab 60 mg (PROLIA 60 mg/ml)  60 mg Subcutaneous Q6 Months Surya Delaney MD   60 mg at 08/09/17 1108       Total data points: 5

## 2021-06-12 NOTE — PROGRESS NOTES
ASSESSMENT:  1.  Atrial fibrillation: Helen has noted that her pulse has been faster recently, often up to 115.  Her metoprolol was increased to 75 mg.  She does complain of feeling more fatigued on the higher dose.  I would favor adding digoxin.  And she complains of fatigue from metoprolol, it would be best to avoid a higher dose if possible.  2.  Peripheral edema: He is on furosemide 40 mg twice daily.  Edema has been under reasonable control with this.  Her BNP was elevated to 546 in February.    PLAN:  1.  Check TSH and hemogram due to tachycardia.  [facility] for medication monitoring.  2.  Add digoxin 0.125 mg daily.  Continue metoprolol XL 75 mg daily.  3.  Monitor blood pressure and pulse.  Clinic follow-up 1 month.  She will be notified of test results.    Orders Placed This Encounter   Procedures     Thyroid Stimulating Hormone (TSH)     Basic Metabolic Panel     HM2(CBC w/o Differential)     There are no discontinued medications.    No Follow-up on file.    ASSESSED PROBLEMS:  1. Hypothyroidism  Thyroid Stimulating Hormone (TSH)   2. Persistent atrial fibrillation  INR    digoxin (LANOXIN) 125 mcg tablet   3. Medication monitoring encounter  Basic Metabolic Panel    HM2(CBC w/o Differential)       CHIEF COMPLAINT:  Chief Complaint   Patient presents with     Follow-up     6 month       HISTORY OF PRESENT ILLNESS:  Helen is a 86 y.o. female presenting to the clinic today for follow up on atrial fibrillation. She has noticed that her pulse has frequently been elevated, around 115 beats per minutes. She continues to take metoprolol 75 mg daily, though she complains that this has made her feel tired. She has not noticed any decrease in pulse since she increased from 50 mg to 75 mg of metoprolol.  She recalls being on digoxin in the past. She states that her breathing has been okay.     REVIEW OF SYSTEMS:   Peripheral edema. She has been taking two tablets of 40 mg of furosemide every day. She urinates a bit after  "taking the diuretic, but not profusely. She has not noticed any swelling of her ankles   All other systems are negative.    PFSH:  She has had a busy summer with many social events and affairs attended.     TOBACCO USE:  History   Smoking Status     Never Smoker   Smokeless Tobacco     Never Used       VITALS:  Vitals:    07/25/17 1447   BP: 120/74   Patient Site: Left Arm   Patient Position: Sitting   Cuff Size: Adult Regular   Pulse: (!) 104   SpO2: 96%   Weight: 143 lb 3.2 oz (65 kg)   Height: 5' 2\" (1.575 m)     Wt Readings from Last 3 Encounters:   07/25/17 143 lb 3.2 oz (65 kg)   06/02/17 137 lb (62.1 kg)   05/01/17 136 lb (61.7 kg)       PHYSICAL EXAM:  Constitutional:   Reveals an alert, pleasant, talkative female. Does not appear acutely ill.   Vitals: per nursing notes.  HEENT:  Atraumatic.   Neck:  Supple, no carotid bruits or adenopathy.  Back:  Mild kyphoscoliosis   Thorax:  No bony deformities.  Lungs: Clear to A&P without rales or wheezes.  Respiratory effort normal.  Cardiac:   Irregularly irregular rhythm with rate of 100. No murmur appreciated.   Abdomen:  Soft, active bowel sounds without bruits, mass, or tenderness.  Extremities:  Trace right ankle, no significant left ankle edema. , pulses in the feet intact.    Skin:  No abnormal pallor.   Neuro:  Alert and oriented. Cranial nerves, motor, sensory exams are intact.  No gross focal deficits.  Psychiatric:  Memory intact, mood appropriate.    ADDITIONAL HISTORY SUMMARIZED (2): Notes from 6/2/2017 reviewed regarding atrial fibrillation.  DECISION TO OBTAIN EXTRA INFORMATION (1): Care everywhere accessed.   RADIOLOGY TESTS (1): None.  LABS (1): Labs ordered.  MEDICINE TESTS (1): Echocardiogram reviewed from 5/2017. Holter from 5/1/2017 reviewed  INDEPENDENT REVIEW (2 each): None.     The visit lasted a total of 20 minutes face to face with the patient. Over 50% of the time was spent counseling and educating the patient about atrial " fibrillation.    I, Jesus Almonte, am scribing for and in the presence of, Dr. Delaney.    I, Dr. Delaney, personally performed the services described in this documentation, as scribed by Jesus Almonte in my presence, and it is both accurate and complete.    Dragon dictation was used for this note.  Speech recognition errors are a possibility.    MEDICATIONS:  Current Outpatient Prescriptions   Medication Sig Dispense Refill     clindamycin (CLEOCIN) 300 MG capsule   0     furosemide (LASIX) 40 MG tablet Take 40 mg by mouth daily.       metoprolol succinate (TOPROL-XL) 50 MG 24 hr tablet TAKE ONE TABLET BY MOUTH ONE TIME DAILY  (Patient taking differently: TAKE ONE AND ONE HALF TABLET BY MOUTH ONE TIME DAILY. TOTALING 75MG) 90 tablet 1     pantoprazole (PROTONIX) 40 MG tablet TAKE 1 TABLET (40 MG) BY MOUTH DAILY 90 tablet 3     potassium chloride SA (K-DUR,KLOR-CON) 10 MEQ tablet Take 1 tablet (10 mEq total) by mouth 2 (two) times a day. 180 tablet 3     warfarin (COUMADIN) 2.5 MG tablet Take 0.5- 1 tablets (1.25 to 2.5 mg) by mouth daily. Adjust dose based on INR results as directed. 90 tablet 3     digoxin (LANOXIN) 125 mcg tablet Take 1 tablet (125 mcg total) by mouth daily. 30 tablet 11     furosemide (LASIX) 20 MG tablet TAKE ONE TABLET BY MOUTH TWICE DAILY  90 tablet 3     Current Facility-Administered Medications   Medication Dose Route Frequency Provider Last Rate Last Dose     denosumab 60 mg (PROLIA 60 mg/ml)  60 mg Subcutaneous Q6 Months Surya Delaney MD   60 mg at 01/23/17 1251       Total data points: 5.

## 2021-06-12 NOTE — TELEPHONE ENCOUNTER
ANTICOAGULATION  MANAGEMENT    Assessment     Today's INR result of 2.6 is Therapeutic (goal INR of 2.0-3.0)        Warfarin taken as previously instructed    No new diet changes affecting INR    No new medication/supplements affecting INR    Continues to tolerate warfarin with no reported s/s of bleeding or thromboembolism     Previous INR was Therapeutic    Plan:     Spoke on phone with Helen regarding INR result and instructed:     Warfarin Dosing Instructions:  Continue current warfarin dose 1 mg daily on sun/tue/thu; and 2 mg daily rest of week  (0 % change)    Instructed patient to follow up no later than: two weeks    Helen verbalizes understanding and agrees to warfarin dosing plan.    Instructed to call the [facility] for any changes, questions or concerns. (#[phone])   ?   Jack Curtis RN    Subjective/Objective:      Helen Beckham, a 89 y.o. female is on warfarin.     Helen reports:     Home warfarin dose: as updated on anticoagulation calendar per template     Missed doses: No     Medication changes:  No     S/S of bleeding or thromboembolism:  No     New Injury or illness:  No     Changes in diet or alcohol consumption:  No     Upcoming surgery, procedure or cardioversion:  No    Anticoagulation Episode Summary     Current INR goal:  2.0-3.0   TTR:  59.3 % (1 y)   Next INR check:  11/10/2020   INR from last check:  2.60 (10/27/2020)   Weekly max warfarin dose:     Target end date:     INR check location:     Preferred lab:     Send INR reminders to:  [facility]    Indications    Chronic atrial fibrillation (H) [I48.20]           Comments:           Anticoagulation Care Providers     Provider Role Specialty Phone number    Surya Delaney MD Referring Internal Medicine [phone]

## 2021-06-13 NOTE — TELEPHONE ENCOUNTER
"Anticoagulation Annual Referral Renewal Review    Helen Beckham's chart reviewed for annual renewal of referral to anticoagulation monitoring.        Criteria for anticoagulation nurse and/or pharmacist renewal met   Warfarin indication: Atrial Fibrillation Yes, per indication   Current with INR monitoring/compliant Yes Yes   Date of last office visit 9/14/20 Yes, had office visit within last year   Time in Therapeutic Range (TTR) 59.3 % No, TTR < 60 %       Helen Beckham did NOT meet all criteria for anticoagulation management program initiated renewal and requires provider review. Using dot phrase, \".acmrenewalprovider\", please advise if Helen's anticoagulation management referral should be renewed or if patient should be seen in office to review anticoagulation therapy      Jack Curtis RN  4:47 PM      "

## 2021-06-13 NOTE — TELEPHONE ENCOUNTER
ANTICOAGULATION  MANAGEMENT    Assessment     Today's INR result of 2.1 is Therapeutic (goal INR of 2.0-3.0)        Warfarin taken as previously instructed    No new diet changes affecting INR    No interaction expected between clindamycin and warfarin - taking for an abscessed tooth    Continues to tolerate warfarin with no reported s/s of bleeding or thromboembolism     Previous INR was Therapeutic    Plan:     Spoke on phone with Helen regarding INR result and instructed:     Warfarin Dosing Instructions:  Continue current warfarin dose 1 mg daily on Sun, Tue, Thu; and 2 mg daily rest of week  (0 % change)    Instructed patient to follow up no later than: 3 weeks    Education provided: target INR goal and significance of current INR result, no interaction anticipated between warfarin and clindamycin, importance of notifying clinic for changes in medications and importance of notifying clinic for diarrhea, nausea/vomiting, reduced intake and/or illness    Helen verbalizes understanding and agrees to warfarin dosing plan.    Instructed to call the [facility] for any changes, questions or concerns. (#[phone])   ?   Katey Barriga RN    Subjective/Objective:      Helen Beckham, a 89 y.o. female is on warfarin.     Helen reports:     Home warfarin dose: template incorrect; verbally confirmed home dose with Helen and updated on anticoagulation calendar     Missed doses: No     Medication changes:  Yes: clindamycin for abcess tooth     S/S of bleeding or thromboembolism:  No     New Injury or illness:  No     Changes in diet or alcohol consumption:  No     Upcoming surgery, procedure or cardioversion:  No    Anticoagulation Episode Summary     Current INR goal:  2.0-3.0   TTR:  61.5 % (1 y)   Next INR check:  1/5/2021   INR from last check:  2.10 (12/8/2020)   Weekly max warfarin dose:     Target end date:     INR check location:     Preferred lab:     Send INR reminders to:  [facility]    Indications     Chronic atrial fibrillation (H) [I48.20]           Comments:           Anticoagulation Care Providers     Provider Role Specialty Phone number    Surya Delaney MD Referring Internal Medicine [phone]

## 2021-06-13 NOTE — PROGRESS NOTES
Notes Recorded by Aaron Mathew MD on 5/2/2017 at 9:13 PM  Echo is stable, lv function lower limits of normal, will need to recheck echo in about 6 months to follow up on moderate ai, nothing to worry about  t ymdg    Per  order request confirmed. Order placed.

## 2021-06-13 NOTE — TELEPHONE ENCOUNTER
Provider Review: Anticoagulation Annual Referral Renewal    ACM Renewal Decision:  Renew ACM warfarin management      INR Range:   Continue management at current INR goal   Anticipated Duration of Therapy (from today):  Long-term anticoagulation      Surya Delaney MD  5:58 PM

## 2021-06-13 NOTE — TELEPHONE ENCOUNTER
Who is calling:  Helen  Reason for Call:  Patient was having a toothache. She was given medication for it & its no longer hurting. Her dentist would like to pull the tooth, please advise. Patient would like to speak with Jack.  Date of last appointment with primary care: 12/8/2020  Okay to leave a detailed message: Yes

## 2021-06-13 NOTE — PATIENT INSTRUCTIONS - HE
"1.  Get Prolia after healing from dental extraction.    2.  Reclast advised six months after Prolia, then begin a \"drug free holiday\" away from osteoporosis treatment    3.  Covid vaccine when available.    4.  Dermatology evaluation of lesion under left eye.  (Possible basal cell cancer).  See Dr. Grant.    5. See in 3 to 4 months  "

## 2021-06-13 NOTE — PROGRESS NOTES
"ASSESSMENT:  1.  Chronic diastolic heart failure:  Helen notes that she tires more easily, and has more postural instability compared to years ago.  However, symptoms of CHF seem well compensated.  She does not have significant peripheral edema, orthopnea or PND.  She will continue her current diuretic dose.    2.  Chronic atrial fibrillation:  Heart rate is in the desired range.  She continues on warfarin as an anticoagulant.  Overall, INR is have been stable.  She has not had difficulty having INRs checked to this point.  If this becomes more difficult over the winter, consideration could be given to switching to a different anticoagulant.    3.  Osteoporosis on Prolia:  She reports she is now on clindamycin for dental infection and then will have a dental extraction.  I would advise waiting on her next Prolia injection until she heals from the dental extraction.  Prolia was first started in 2016.  Consideration should be given to giving a dose of Reclast next year, then beginning a \"drug-free holiday \".    4.  Essential hypertension:  Blood pressure is good on her current regimen    5.  Skin neoplasm of uncertain significance:  She has a lesion under her left eye possibly suggestive of a basal cell cancer.    PLAN:  1.  Get Prolia after recovery from upcoming dental extraction    2.  Reclast advised 6 months after Prolia.  Then begin a \"drug-free holiday \"away from antiresorptive therapy.    3.  Lesion under left eye is suggestive of a possible basal cell cancer.  She has seen Dr. Grant in the past.  Dermatology evaluation is advised.    4.  Covid vaccine when available    5.  Check basic metabolic panel for medication monitoring    6.  See in 3 to 4 months or as needed.    Orders Placed This Encounter   Procedures     Basic Metabolic Panel     Medications Discontinued During This Encounter   Medication Reason     furosemide (LASIX) 40 MG tablet          ASSESSED PROBLEMS:  1. Age-related osteoporosis without " "current pathological fracture     2. Chronic diastolic congestive heart failure (H)  Basic Metabolic Panel    furosemide (LASIX) 40 MG tablet   3. Chronic atrial fibrillation (H)  INR   4. Essential hypertension         CHIEF COMPLAINT:  Chief Complaint   Patient presents with     Follow-up       HISTORY OF PRESENT ILLNESS:  Helen is a 89 y.o. female seen for follow-up of multiple concerns.  She has a history of diastolic heart failure.  She is currently taking 20 mg of furosemide daily.  She prefers to try to avoid a higher dose due to urinary frequency.  She has minor swelling of the right ankle ankle after prolonged standing.  She does not have orthopnea or PND.  She acknowledges that she tires more easily and has more postural instability in the past.  However, she still remains quite active.    She has a history of chronic atrial fibrillation.  She remains on warfarin as an anticoagulant.  She is on metoprolol 25 mg twice daily.  She does have a defibrillator.  She has not had symptomatic palpitations.    She is on metoprolol and low-dose losartan for hypertension.  Blood pressure has been under good control.    She reports she was just started on clindamycin for a dental infection.  She has a dental extraction planned.  She is due for Prolia in the near future.  She is advised to wait on this until the dental extraction has healed.    REVIEW OF SYSTEMS:    Comprehensive review of systems is otherwise negative.    PFSH:  .  Lives independently.  Avid hockey fan.  Has had [employer] season tickets for many years.    TOBACCO USE:  Social History     Tobacco Use   Smoking Status Never Smoker   Smokeless Tobacco Never Used       VITALS:  Vitals:    12/08/20 1253   BP: 122/84   Patient Site: Left Arm   Patient Position: Sitting   Cuff Size: Adult Regular   Pulse: 86   SpO2: 99%   Weight: 136 lb (61.7 kg)   Height: 5' 0.5\" (1.537 m)     Wt Readings from Last 3 Encounters:   12/08/20 136 lb (61.7 kg)   09/14/20 139 " lb 8 oz (63.3 kg)   05/18/20 135 lb (61.2 kg)       PHYSICAL EXAM:  Constitutional:   Reveals an alert pleasant healthy-appearing woman who appears younger than stated age.   Vitals: per nursing notes.  HEENT:  Atraumatic    Eyes:  EOMs full, PERRL.  Pearly lesion under the left eyes suspicious for basal cell cancer  Oropharynx:   Mouth and throat clear, no thrush or exudate.  Neck:  Supple, no carotid bruits or adenopathy.  Back: Moderate kyphoscoliosis  Thorax:  No bony deformities.  Pacer on left  Lungs: Clear to A&P without rales or wheezes.  Respiratory effort normal.  Cardiac: Irregularly irregular rate and rhythm, normal S1, S2, no murmur or gallop.  Abdomen:  Soft, active bowel sounds without bruits, mass, or tenderness.  Old surgical scars  Extremities:   Trace  peripheral edema, pulses in the feet intact.    Skin:  No jaundice, peripheral cyanosis or foot ulcers.  Lesion under left eye as above  Neuro:  Alert and oriented. .  No gross focal deficits.  Psychiatric:  Memory intact, mood appropriate.    DATA REVIEWED:  Additional History from Old Records Summarized (2): Cardiology notes reviewed  Decision to Obtain Records (1): None.   Radiology Tests Summarized or Ordered (1): Previous DEXA reviewed  Labs Reviewed or Ordered (1): Labs ordered  Medicine Test Summarized or Ordered (1): Cardiac echo ordered  Independent Review of EKG or X-RAY(2 each): None.    The visit lasted a total of 25 minutes face to face with the patient. Over 50% of the time was spent counseling and educating the patient about management of chronic heart failure with preserved ejection fraction, chronic atrial fib, and other health issues    .    Dragon dictation was used for this note. Speech recognition errors are a possibility.     MEDICATIONS:  Current Outpatient Medications   Medication Sig Dispense Refill     biotin 5,000 mcg TbDL Take by mouth.       cholecalciferol, vitamin D3, 1,000 unit tablet Take 1,000 Units by mouth daily.        clindamycin (CLEOCIN) 300 MG capsule Prior to dental appointments  0     COUMADIN 1 mg tablet Take 1 mg by mouth daily on Sunday and Thursday; and 2 mg by mouth daily the rest of the week.  Adjust dose based on INR results as directed. 180 tablet 1     COUMADIN 2.5 mg tablet TAKE ONE-HALF TO ONE TABLET (1.25 MG TO 2.5 MG) BY MOUTH DAILY. ADJUST DOSE BASED ON INR RESULTS AS DIRECTED. 60 tablet 1     cyanocobalamin 1000 MCG tablet Take 1,000 mcg by mouth daily.       furosemide (LASIX) 40 MG tablet Take 0.5 tablets (20 mg total) by mouth daily. 90 tablet 3     losartan (COZAAR) 25 MG tablet Take 1 tablet (25 mg total) by mouth daily. 90 tablet 3     metoprolol tartrate (LOPRESSOR) 25 MG tablet Take 1 tablet by mouth 2 times a day. 180 tablet 3     multivitamin with minerals (THERA-M) 9 mg iron-400 mcg Tab tablet Take 1 tablet by mouth daily.       pantoprazole (PROTONIX) 40 MG tablet TAKE ONE TABLET BY MOUTH ONE TIME DAILY 90 tablet 3     potassium chloride (KLOR-CON M10) 10 MEQ tablet Take 2 tablets (20 mEq total) by mouth daily. 180 tablet 3     traMADol (ULTRAM) 50 mg tablet Take 1 tablet (50 mg total) by mouth every 6 (six) hours as needed for pain. 30 tablet 0     No current facility-administered medications for this visit.

## 2021-06-13 NOTE — TELEPHONE ENCOUNTER
ANTICOAGULATION  MANAGEMENT    Assessment     Today's INR result of 2.7 is Therapeutic (goal INR of 2.0-3.0)        Warfarin taken as previously instructed    No new diet changes affecting INR    No new medication/supplements affecting INR    Continues to tolerate warfarin with no reported s/s of bleeding or thromboembolism     Previous INR was Therapeutic    Plan:     Spoke on phone with Helen regarding INR result and instructed:     Warfarin Dosing Instructions:  Continue current warfarin dose 1 mg daily on sun/tue/thu; and 2 mg daily rest of week  (0 % change)    Instructed patient to follow up no later than: three weeks with ov    Helen verbalizes understanding and agrees to warfarin dosing plan.    Instructed to call the [facility] for any changes, questions or concerns. (#[phone])   ?   Jack Curtis RN    Subjective/Objective:      Helen Beckham, a 89 y.o. female is on warfarin.     Helen reports:     Home warfarin dose: as updated on anticoagulation calendar per template     Missed doses: No     Medication changes:  No     S/S of bleeding or thromboembolism:  No     New Injury or illness:  No     Changes in diet or alcohol consumption:  No     Upcoming surgery, procedure or cardioversion:  No    Anticoagulation Episode Summary     Current INR goal:  2.0-3.0   TTR:  59.3 % (1 y)   Next INR check:  12/8/2020   INR from last check:  2.70 (11/17/2020)   Weekly max warfarin dose:     Target end date:     INR check location:     Preferred lab:     Send INR reminders to:  FARNAZ MIDWAY    Indications    Chronic atrial fibrillation (H) [I48.20]           Comments:           Anticoagulation Care Providers     Provider Role Specialty Phone number    Surya Delaney MD Referring Internal Medicine [phone]

## 2021-06-13 NOTE — TELEPHONE ENCOUNTER
ANTICOAGULATION  MANAGEMENT    Assessment     Today's INR result of 2.3 is Therapeutic (goal INR of 2.0-3.0)        Warfarin taken as previously instructed    No new diet changes affecting INR    No new medication/supplements affecting INR    Continues to tolerate warfarin with no reported s/s of bleeding or thromboembolism     Previous INR was Therapeutic    Plan:     Left detailed message for Helen regarding INR result and instructed:     Warfarin Dosing Instructions:  Continue current warfarin dose 1 mg daily on sun/tue/thu; and 2 mg daily rest of week  (0 % change)    Instructed patient to follow up no later than: 2-3 weeks        Instructed to call the [facility] for any changes, questions or concerns. [phone])   ?   Jack Curtis RN    Subjective/Objective:      Helen Beckham, a 89 y.o. female is on warfarin.     Helen reports:     Home warfarin dose: as updated on anticoagulation calendar per template     Missed doses: No     Medication changes:  No     S/S of bleeding or thromboembolism:  No     New Injury or illness:  No     Changes in diet or alcohol consumption:  No     Upcoming surgery, procedure or cardioversion:  No    Anticoagulation Episode Summary     Current INR goal:  2.0-3.0   TTR:  61.5 % (1 y)   Next INR check:  1/12/2021   INR from last check:  2.30 (12/22/2020)   Weekly max warfarin dose:     Target end date:     INR check location:     Preferred lab:     Send INR reminders to:  FARNAZ MIDWAY    Indications    Chronic atrial fibrillation (H) [I48.20]           Comments:           Anticoagulation Care Providers     Provider Role Specialty Phone number    Surya Delaney MD Referring Internal Medicine [phone]

## 2021-06-14 NOTE — PROGRESS NOTES
Joint Aspiration/Injection  Date/Time: 11/20/2017 2:18 PM  Performed by: GRANT DUMONT  Authorized by: GRANT DUMONT   Indications: pain   Body area: knee  Joint: left knee  Local anesthesia used: yes    Anesthesia:  Local anesthesia used: yes  Local Anesthetic: lidocaine 2% with epinephrine    Sedation:  Patient sedated: no  Preparation: Patient was prepped and draped in the usual sterile fashion.  Ultrasound guidance: no  Approach: lateral  Methylprednisolone amount: 80 mg  Lidocaine 2% amount: 1.5 mL  Patient tolerance: Patient tolerated the procedure well with no immediate complications  Comments: She has pain in her left knee and would like to proceed with injection. The risks were discussed and written consent was obtained. The left knee was prepped and draped in the usual sterile fashion. At that time, 80 mg of Depo Medrol with 1.5 cc of Lidocaine 2% was injected into the knee taking a lateral approach. She tolerated the procedure well and a bandage was placed over the injection site.       IGareth, am scribing for and in the presence of, Dr. Dumont.    I, Dr. KAL Dumont, personally performed the services described in this documentation, as scribed by Gareth Vaca in my presence, and it is both accurate and complete.

## 2021-06-14 NOTE — TELEPHONE ENCOUNTER
Pharmacy states they can not get Coumadin and patient does not want warfarin.  Pharmacy asking for an alternative but no suggestions have been given.

## 2021-06-14 NOTE — TELEPHONE ENCOUNTER
RN cannot approve Refill Request    RN can NOT refill this medication refill sig does not match med list. Last office visit: 12/8/2020 Surya Delaney MD Last Physical: Visit date not found Last MTM visit: Visit date not found Last visit same specialty: 12/8/2020 Surya Delaney MD.  Next visit within 3 mo: Visit date not found  Next physical within 3 mo: Visit date not found      Beena Stephen, [facility] [place] Connection Triage/Med Refill 1/6/2021    Requested Prescriptions   Pending Prescriptions Disp Refills     potassium chloride (K-DUR,KLOR-CON) 10 MEQ tablet [Pharmacy Med Name: Potassium Chloride Ailyn ER Oral Tablet Extended Release 10 MEQ] 90 tablet 0     Sig: Take 1 tablet (10 mEq total) by mouth daily.       Potassium Supplements Refill Protocol Passed - 1/5/2021  3:00 PM        Passed - PCP or prescribing provider visit in past 12 months       Last office visit with prescriber/PCP: 12/8/2020 Surya Delaney MD OR same dept: 12/8/2020 Surya Delaney MD OR same specialty: 12/8/2020 Surya Delaney MD  Last physical: Visit date not found Last MTM visit: Visit date not found   Next visit within 3 mo: Visit date not found  Next physical within 3 mo: Visit date not found  Prescriber OR PCP: Surya Delaney MD  Last diagnosis associated with med order: 1. Diuretic-induced hypokalemia  - potassium chloride (K-DUR,KLOR-CON) 10 MEQ tablet [Pharmacy Med Name: Potassium Chloride Ailyn ER Oral Tablet Extended Release 10 MEQ]; Take 1 tablet (10 mEq total) by mouth daily.  Dispense: 90 tablet; Refill: 0    If protocol passes may refill for 12 months if within 3 months of last provider visit (or a total of 15 months).             Passed - Potassium level in last 12 months     Lab Results   Component Value Date    Potassium 4.0 12/08/2020

## 2021-06-14 NOTE — TELEPHONE ENCOUNTER
Reason for Call:  Medication or medication refill:    Do you use a [facility]?  Name of the pharmacy and phone number for the current request: [facility] [address] [place]    Name of the medication requested: coumadin 1 mg  Other request:pt in need of this asap    Can we leave a detailed message on this number? Yes    Phone number patient can be reached at: Home number on file [phone] (home)    Best Time: asap    Call taken on 1/4/2021 at 11:12 AM by Mikki Bull

## 2021-06-14 NOTE — TELEPHONE ENCOUNTER
FYI - Status Update  Who is Calling: Patient  Update: Patient calling to find out if she has an INR appointment on 1/12/21. Reviewed ACN's note that next INR should be in 2-3 weeks after last INR, and offered to assist her in scheduling. Patient would like to speak with FELIPE Santiago, requests call back.  Okay to leave a detailed message?:  Yes

## 2021-06-14 NOTE — PROGRESS NOTES
ASSESSMENT:  1.  Chronic atrial fibrillation: Initial heart rate was over 100.  It was in the 80s when rechecked by MD.  Her last Holter monitor revealed acceptable heart rate control.  She reports readings at home have generally been in the 80s.    2.  History of osteoporosis: Recent bone density study was reviewed.  Bone density has increased in all measured sites, and now is in the osteopenic range.  I would advise continuing Prolia for an additional several years if insurance coverage remains good.    3.  Left knee pain: This is chronic and presumably related to degenerative arthritis.  Management options were discussed.  She is interested in a steroid injection    PLAN:  1.  Continue Prolia.  Recheck bone density in 2 years.  2.  Continue metoprolol and digoxin for control of heart rate with atrial fibrillation  3.  After informed consent and sterilely prepping, the left knee was injected with 80 mg of Depo-Medrol with 1-1/2 cc of 2% lidocaine.  She tolerated this well.  4.  Report the effect of the injection on knee pain in 2 weeks.  Clinic follow-up 3 months.      There are no discontinued medications.        ASSESSED PROBLEMS:  1. Dermatitis  mometasone (ELOCON) 0.1 % cream   2. Persistent atrial fibrillation  INR       CHIEF COMPLAINT:  Chief Complaint   Patient presents with     Follow-up     3 month follow up digoxin       HISTORY OF PRESENT ILLNESS:  Helen is a 86 y.o. female presenting to the clinic today for a medication check.     Atrial Fibrillation: She has not felt any palpitations, so she thinks her heart is fine. She was seen by Dr. Mathew recently, and he also thought she was doing well from a cardiac standpoint. She had an echo done on 11/3 that looked good. She also had a Holter monitor in August that showed her pulse to be in an acceptable range.     Knee Pain: She has pain in her left knee and is wearing a brace. She does not have clicking or locking in the knee, but she has a lot of pain in  "the medial and lateral aspects. She does not know if she has ever had a cortisone injection in the knee or not. She was seen at [facility] for knee pain over a year ago. She would like to try an injection today.     REVIEW OF SYSTEMS:   She has been taking vitamins for energy. She has not had much lower extremity edema. She has a skin lesion on her torso that she would like looked at. Her pulse was normal on her last Holter monitor, but it is high in the clinic today. All other systems are negative.    PFSH:  She has been very busy.     TOBACCO USE:  History   Smoking Status     Never Smoker   Smokeless Tobacco     Never Used       VITALS:  Vitals:    11/20/17 1318   BP: 110/84   Patient Site: Left Arm   Patient Position: Sitting   Cuff Size: Adult Regular   Pulse: (!) 120   Weight: 135 lb 9.6 oz (61.5 kg)   Height: 5' 2\" (1.575 m)     Wt Readings from Last 3 Encounters:   11/20/17 135 lb 9.6 oz (61.5 kg)   11/10/17 135 lb (61.2 kg)   11/03/17 140 lb (63.5 kg)     Body mass index is 24.8 kg/(m^2).    PHYSICAL EXAM:  Constitutional:   Reveals an alert, talkative woman. Affect appropriate. Does not appear acutely ill.  Vitals: per nursing notes.  HEENT: Atraumatic. Ears:  External canals, TMs clear.    Eyes:  EOMs full, PERRL.  Oropharynx:   Mouth and throat clear, no thrush or exudate.  Neck:  Supple, no carotid bruits or adenopathy.  Back:  No spine or CVA pain.  Thorax:  No bony deformities.  Lungs: Clear to A&P without rales or wheezes.  Respiratory effort normal.  Cardiac: Irregularly irregular with rate in 80's.   Abdomen:  Soft, active bowel sounds without bruits, mass, or tenderness.  Breasts: No masses.   Extremities:  No significant edema. Moderate crepitance to ROM of left knee, no joint effusion or ligamentous instability.   Skin: Scattered actinic keratoses, 1 cm keratotic area left upper abdomen, surrounding 1 cm of erythema and a slight flaking of the skin. No vesicles.   Neuro:  Alert and oriented.  " No gross focal deficits.  Psychiatric:  Memory intact, mood appropriate.    ADDITIONAL HISTORY SUMMARIZED (2): Reviewed 11/10 Dr. Mathew note regarding atrial fibrillation. Reviewed 6/15/2016 [facility] note regarding knee pain  DECISION TO OBTAIN EXTRA INFORMATION (1): None.   RADIOLOGY TESTS (1): Reviewed 11/13/2017 DXA - osteopenia.   LABS (1): Labs from 7/25/2017 reviewed. Labs ordered.   MEDICINE TESTS (1): Reviewed 11/3/2017 echo. Reviewed 8/30/2017 Holter - atrial fibrillation  INDEPENDENT REVIEW (2 each): None.     The visit lasted a total of 25 minutes face to face with the patient. Over 50% of the time was spent counseling and educating the patient about her a-fib and knee pain.    IGareth, am scribing for and in the presence of, Dr. Delaney.    KAL CENTENO, personally performed the services described in this documentation, as scribed by Gareth Vaca in my presence, and it is both accurate and complete.    Dragon dictation was used for this note.  Speech recognition errors are a possibility.    MEDICATIONS:  Current Outpatient Prescriptions   Medication Sig Dispense Refill     clindamycin (CLEOCIN) 300 MG capsule Prior to dental appointments  0     COUMADIN 2.5 mg tablet TAKE ONE-HALF TO ONE TABLET (1.25 MG TO 2.5 MG) BY MOUTH DAILY. ADJUST DOSE BASED ON INR RESULTS AS DIRECTED. 90 tablet 1     digoxin (LANOXIN) 125 mcg tablet Take 1 tablet (125 mcg total) by mouth daily. 30 tablet 11     furosemide (LASIX) 20 MG tablet TAKE ONE TABLET BY MOUTH TWICE DAILY  (Patient taking differently: TAKE ONE TABLET BY MOUTH DAILY) 90 tablet 3     furosemide (LASIX) 40 MG tablet Take 40 mg by mouth daily.       metoprolol succinate (TOPROL-XL) 50 MG 24 hr tablet TAKE ONE TABLET BY MOUTH ONE TIME DAILY  90 tablet 1     pantoprazole (PROTONIX) 40 MG tablet TAKE 1 TABLET (40 MG) BY MOUTH DAILY 90 tablet 3     potassium chloride SA (K-DUR,KLOR-CON) 10 MEQ tablet Take 1 tablet (10 mEq total) by mouth 2 (two) times  a day. 180 tablet 3     mometasone (ELOCON) 0.1 % cream Apply to affected area twice daily until resoled 15 g 1     Current Facility-Administered Medications   Medication Dose Route Frequency Provider Last Rate Last Dose     denosumab 60 mg (PROLIA 60 mg/ml)  60 mg Subcutaneous Q6 Months Surya Delaney MD   60 mg at 08/09/17 1108       Total data points: 5

## 2021-06-14 NOTE — TELEPHONE ENCOUNTER
Spoke with pt to further clarify as pcp sent in prescription for Warfarin on 1/4/21.  Pt reports since coumadin is no longer manufactured, pt wanted to make sure it was okay she took warfarin.  Assured pt that they were the same drug, just one was brand and one was generic.  Pt understanding. Will alert clinic if she has any side effects from warfarin.

## 2021-06-14 NOTE — PROGRESS NOTES
[facility] Progress Note    Assessment:  1.  Chronic atrial fibrillation with some slower ventricular response earlier in the year which prompted decrease in atenolol and discontinuation of diltiazem.  He subsequently had evidence for higher heart rates and when she saw Dr. Jones in July 2017 digoxin was added to her medication regimen.  She had a repeat Holter monitor to demonstrated heart rates to be reasonable.  She feels well without complaints of tachypalpitations or shortness of breath orthopnea or PND.  She will continue to monitor for symptoms.  She does monitor her blood pressure and pulse.  Her preference is to remain on warfarin and I reviewed the newer anticoagulant agents as well as watchman device with her today.  She has a chads 2 vascular of 4.    2.  Hypertension.  Blood pressure appears to be under good control.  Indicates the blood pressures are similar to blood pressures obtained here in the office today.  She will continue to monitor her blood pressure.  Lower extremity edema has been stable with the current dose of furosemide 20 mg once daily.  [place] studies from July 2017 and reviewed as well as his oxygen level is 0.5 August 2017.        Plan: Follow-up in 6 months or sooner if there are specific cardiac issues.    1. Chronic atrial fibrillation     2. Essential hypertension           An After Visit Summary was printed and given to the patient.    Subjective:    Helen Beckham is a 86 y.o. female who returned for a planned  follow up visit.  She reports that she been feeling well.  She denies any specific complaints of dizziness, lightheadedness, chest discomfort or shortness of breath.  She indicates that her lower extremity edema has been stable.  I have reviewed recent notes by her primary care physician.  Her medications were decreased previously because of bradycardic tendency.  She subsequently had findings of increased heart rate and digoxin was added by   Karen.  She had a Holter monitor this 32,017 and demonstrated average heart rate in atrial fibrillation 75-90 bpm at rest and  bpm with activity.  No significant pauses or significant ectopy.    Review of Systems:   General: WNL  Eyes: WNL  Ears/Nose/Throat: WNL  Lungs: WNL  Heart: WNL  Stomach: WNL  Bladder: WNL  Muscle/Joints: WNL  Skin: WNL  Nervous System: WNL  Mental Health: WNL     Blood: WNL      Problem List:    Patient Active Problem List   Diagnosis     Hyperlipidemia     Osteoporosis     Essential hypertension     Chronic atrial fibrillation     Esophageal Reflux     Diverticulosis     Osteoarthritis       Social History     Social History     Marital status:      Spouse name: N/A     Number of children: 2     Years of education: N/A     Occupational History     Not on file.     Social History Main Topics     Smoking status: Never Smoker     Smokeless tobacco: Never Used     Alcohol use No     Drug use: No     Sexual activity: Not Currently     Partners: Male     Other Topics Concern     Not on file     Social History Narrative    She is .  She lives alone independently.  Her son Nigel is heavily involved in her care.  She has grandchildren and great-grandchildren.       Family History   Problem Relation Age of Onset     Heart disease Mother      No Medical Problems Father      COPD Sister      Kidney failure Brother      Liver disease Brother      No Medical Problems Son      No Medical Problems Daughter        Current Outpatient Prescriptions   Medication Sig Dispense Refill     clindamycin (CLEOCIN) 300 MG capsule Prior to dental appointments  0     COUMADIN 2.5 mg tablet TAKE ONE-HALF TO ONE TABLET (1.25 MG TO 2.5 MG) BY MOUTH DAILY. ADJUST DOSE BASED ON INR RESULTS AS DIRECTED. 90 tablet 1     digoxin (LANOXIN) 125 mcg tablet Take 1 tablet (125 mcg total) by mouth daily. 30 tablet 11     furosemide (LASIX) 20 MG tablet TAKE ONE TABLET BY MOUTH TWICE DAILY  (Patient taking  "differently: TAKE ONE TABLET BY MOUTH DAILY) 90 tablet 3     metoprolol succinate (TOPROL-XL) 50 MG 24 hr tablet TAKE ONE TABLET BY MOUTH ONE TIME DAILY  90 tablet 1     pantoprazole (PROTONIX) 40 MG tablet TAKE 1 TABLET (40 MG) BY MOUTH DAILY 90 tablet 3     potassium chloride SA (K-DUR,KLOR-CON) 10 MEQ tablet Take 1 tablet (10 mEq total) by mouth 2 (two) times a day. 180 tablet 3     furosemide (LASIX) 40 MG tablet Take 40 mg by mouth daily.       Current Facility-Administered Medications   Medication Dose Route Frequency Provider Last Rate Last Dose     denosumab 60 mg (PROLIA 60 mg/ml)  60 mg Subcutaneous Q6 Months Surya Delaney MD   60 mg at 08/09/17 1108       Objective:     /70 (Patient Site: Left Arm, Patient Position: Sitting, Cuff Size: Adult Regular)  Pulse 92  Resp 16  Ht 5' 2\" (1.575 m)  Wt 135 lb (61.2 kg)  BMI 24.69 kg/m2  135 lb (61.2 kg)       Physical Exam:    GENERAL APPEARANCE: alert, no apparent distress  HEENT: no scleral icterus or xanthelasma  NECK: jugular venous pressure within normal limits  CHEST: symmetric, the lungs are clear to auscultation  CARDIOVASCULAR: Irregular rhythm, 1/6 to 2/6 systolic murmur; no carotid bruits  Abdomen: No Organomegaly, masses, bruits, or tenderness. Bowels sounds are present      EXTREMITIES: no cyanosis, clubbing, support stockings in place with trace edema    Cardiac Testing:  HOLTER REPORT     INTERPRETATION DATE:  09/06/2017     TEST DATE:  08/30/2017       INTERPRETATION:  Predominant rhythm is atrial fibrillation, which is present  throughout the monitoring period.  Ventricular response ranged from 51 beats per  minute to 141 beats per minute, averaging 90 beats per minute over the monitoring  period.  Average resting ventricular response was 75 to 90 beats per minute.   Average ventricular response with activity was 90 to approximately 115 beats per  minute.  There were no significant pauses.  Right bundle-branch block aberrancy is  noted " throughout the monitoring period.  Only 12 ventricular premature beats were  present.  No symptoms were reported in the diary.     CONCLUSION:  Persistent atrial fibrillation with controlled ventricular response.        JEAN MARIE RODRIGEZ MD  ca  D 09/06/2017 10:52:19  T 09/06/2017 12:38:59  R 09/06/2017 12:38:59        Indications      Dx: Atrial fibrillation [I48.91 (ICD-10-CM)]       Summary      1. Normal left ventricular size and systolic performance with a visually estimated ejection fraction of 55-60%.   2. There is mild to moderate aortic insufficiency.   3. Normal right ventricular size and systolic performance.   4. There is moderate left atrial enlargement. There is mild to moderate right atrial enlargement.      When compared to the prior real-time echocardiogram dated on May 2017, the findings are fairly similar on both examinations.           Lab Results:    Lab Results   Component Value Date     07/25/2017    K 3.9 07/25/2017     07/25/2017    CO2 28 07/25/2017    BUN 17 07/25/2017    CREATININE 0.87 07/25/2017    CALCIUM 8.7 07/25/2017     Lab Results   Component Value Date    CHOL 220 (H) 01/23/2017    TRIG 124 01/23/2017    HDL 49 (L) 01/23/2017     BNP (pg/mL)   Date Value   02/06/2017 546 (H)   12/07/2015 235 (H)   10/12/2015 298 (H)     Creatinine (mg/dL)   Date Value   07/25/2017 0.87   02/06/2017 0.99   01/23/2017 0.82   09/14/2016 0.92     LDL Calculated (mg/dL)   Date Value   01/23/2017 146 (H)   08/05/2016 163 (H)   07/20/2015 123     Lab Results   Component Value Date    WBC 7.5 07/25/2017    WBC 7.2 04/20/2015    HGB 14.3 07/25/2017    HCT 43.1 07/25/2017    MCV 88 07/25/2017     07/25/2017               This note has been dictated using voice recognition software. Any grammatical or context distortions are unintentional and inherent to the software.      Aaron Mathew M.D., F.A.C.C.  [facility]  [phone]

## 2021-06-14 NOTE — TELEPHONE ENCOUNTER
Dr Delaney-covering    Pharmacy fax us a note that coumadin id discharge by  and [facility] does not want warfarin, they are looking for an alternative    Please advise    Vielka Cho CMA ([facility])]

## 2021-06-14 NOTE — TELEPHONE ENCOUNTER
Pt called [facility] again and was told again that Coumadin is no longer being made, needs to change to warafrin. Please call asap at home #.   She has only 6 left, sometimes takes 2 a day

## 2021-06-14 NOTE — TELEPHONE ENCOUNTER
Who is calling:  Patient   Reason for Call:  Coumadin -- see below  Date of last appointment with primary care:   Okay to leave a detailed message: Yes    Patient says her pharmacy told her that Coumadin is not being made anymore.    Please call and advise.

## 2021-06-14 NOTE — TELEPHONE ENCOUNTER
ANTICOAGULATION  MANAGEMENT    Assessment     Today's INR result of 2.6 is Therapeutic (goal INR of 2.0-3.0)        Warfarin taken as previously instructed    No new diet changes affecting INR    No new medication/supplements affecting INR    Continues to tolerate warfarin with no reported s/s of bleeding or thromboembolism     Previous INR was Therapeutic    Plan:     Spoke on phone with Helen regarding INR result and instructed:     Warfarin Dosing Instructions:  Continue current warfarin dose 1 mg daily on sun/tue/thu; and 2 mg daily rest of week      Instructed patient to follow up no later than: 3 weeks      Helen verbalizes understanding and agrees to warfarin dosing plan.    Instructed to call the [facility] for any changes, questions or concerns. (#[phone])   ?   Jack Curtis RN    Subjective/Objective:      Helen Beckham, a 89 y.o. female is on warfarin.     Helen reports:     Home warfarin dose: as updated on anticoagulation calendar per template     Missed doses: No     Medication changes:  No     S/S of bleeding or thromboembolism:  No     New Injury or illness:  No     Changes in diet or alcohol consumption:  No     Upcoming surgery, procedure or cardioversion:  No    Anticoagulation Episode Summary     Current INR goal:  2.0-3.0   TTR:  61.5 % (1 y)   Next INR check:  2/9/2021   INR from last check:  2.60 (1/19/2021)   Weekly max warfarin dose:     Target end date:     INR check location:     Preferred lab:     Send INR reminders to:  FARNAZ MIDWAY    Indications    Chronic atrial fibrillation (H) [I48.20]           Comments:           Anticoagulation Care Providers     Provider Role Specialty Phone number    Surya Delaney MD Referring Internal Medicine [phone]

## 2021-06-15 NOTE — TELEPHONE ENCOUNTER
New Appointment Needed  What is the reason for the visit:    Inpatient/ED Follow Up Appt Request  At what hospital or facility were you seen?: [facility]  What is the reason you were seen?: Stroke  What date were you admitted?: date: 3/5/21  What date were you discharged?: date: 3/16/21  What was the recommended timeframe for your follow up appointment?: 3-5 days  Provider Preference: PCP only  How soon do you need to be seen?: 3/22/21  Waitlist offered?: No  Okay to leave a detailed message:  Yes

## 2021-06-16 PROBLEM — M81.0 SENILE OSTEOPOROSIS: Status: ACTIVE | Noted: 2020-02-12

## 2021-06-16 PROBLEM — N18.30 CHRONIC KIDNEY DISEASE, STAGE 3 (H): Status: ACTIVE | Noted: 2021-04-20

## 2021-06-16 PROBLEM — I47.21 TORSADES DE POINTES (H): Status: ACTIVE | Noted: 2019-03-12

## 2021-06-16 PROBLEM — I50.32 DIASTOLIC CHF, CHRONIC (H): Status: ACTIVE | Noted: 2018-06-18

## 2021-06-16 PROBLEM — I63.40 CEREBROVASCULAR ACCIDENT (CVA) DUE TO EMBOLISM OF CEREBRAL ARTERY (H): Status: ACTIVE | Noted: 2021-04-20

## 2021-06-16 NOTE — TELEPHONE ENCOUNTER
Who is calling:  patient  Reason for Call:  Patient was calling to speak with ACN regarding her recent release from hospital.   Patient states she had been hospitalized at [facility] due to a stroke and was released 03/16/21  Date of last appointment with primary care:   Okay to leave a detailed message: Yes

## 2021-06-16 NOTE — PROGRESS NOTES
ASSESSMENT AND PLAN:    1. Essential hypertension  Controlled with metoprolol.      2. ICD (implantable cardioverter-defibrillator), single, in situ  No firing.  History of torsades    3. History of CVA (cerebrovascular accident)  Recent small left basal ganglia infarction, her warfarin was possibly sub therapeutic, now on apixiban.  No apparent sequelae now.  Son agrees.      Post Discharge Medication Reconciliation Status: discharge medications reconciled, continue medications without change    Patient Instructions   1. Continue present eliquis and other medications.     2. Follow up as previously arranged with Dr. Delaney    CHIEF COMPLAINT:  Chief Complaint   Patient presents with     Hospital Visit Follow Up     3/16, [facility]     HISTORY OF PRESENT ILLNESS:  Helen Beckham is a 89 y.o. female here in follow up.  Was hospitalized with CVA - her symptoms were mainly memory impairment and confusion. This cleared. No headache or arm or leg weakness.  No further confusion.  Living independently, here with her son.     REVIEW OF SYSTEMS:   See HPI, all other systems on review are negative.    Past Medical History:   Diagnosis Date     Cellulitis of third toe of right foot      Dermatitis 2017     Diverticulosis 1998     Edema 2014     Esophageal reflux 2006     History of CVA (cerebrovascular accident) 3/22/2021     Hyperlipidemia 2007     Hypertension 2008     Osteoarthritis 2005     Paroxysmal atrial fibrillation (H) 2011     Persistent atrial fibrillation (H) 2015     Trochanteric bursitis     osteoarthritis     Venous insufficiency 2016     Social History     Tobacco Use   Smoking Status Never Smoker   Smokeless Tobacco Never Used     Family History   Problem Relation Age of Onset     Heart disease Mother      No Medical Problems Father      COPD Sister      Kidney failure Brother      Liver disease Brother      No Medical Problems Son      No Medical Problems Daughter      Past Surgical History:    Procedure Laterality Date     EYE SURGERY      bilateral cataract surgery     FRACTURE SURGERY      left toe     JOINT REPLACEMENT       [place] CARDIOVERSION ELECTIVE ARRHYTHMIA EXTERNAL      Description: Elective Cardioversion External;  Recorded: 05/19/2014;  Comments: 4/28/14     [place] EXCISE HAND/FOOT NEUROMA      Description: Excision Of Neuroma Of Right Foot;  Proc Date: 04/01/2005;     IL RECONSTR TOTAL SHOULDER IMPLANT      Description: Shoulder Arthroplasty Total Shoulder Replacement;  Proc Date: 07/13/2011;  Comments: REVERSE TOTAL SHOULDER     Single chamber ICD  02/28/2019    For complete heart block and torsade de point arrhythmia     TONSILLECTOMY       VITALS:  Vitals:    03/22/21 1228   BP: 128/62   Pulse: 73   SpO2: 96%   Weight: 133 lb (60.3 kg)     Wt Readings from Last 3 Encounters:   03/22/21 133 lb (60.3 kg)   12/08/20 136 lb (61.7 kg)   09/14/20 139 lb 8 oz (63.3 kg)     PHYSICAL EXAM:  Constitutional:  In NAD, alert and oriented  Cardiac:  S1 S2   Lungs: Clear   Abdomen:   Soft, flat and non-tender    Neurologic:  Speech clear, no arm or leg weakness, gait normal     Psychiatric:  Mood and behavior appropriate, thinking is clear.     DECISION TO OBTAIN OLD RECORDS AND/OR OBTAIN HISTORY FROM SOMEONE OTHER THAN PATIENT, AND/OR ACCESSING CARE EVERYWHERE):  1  Reviewed hospital discharge summary     REVIEW AND SUMMARIZATION OF OLD RECORDS, AND/OR OBTAINING HISTORY FROM SOMEONE OTHER THAN PATIENT, AND/OR DISCUSSION OF CASE WITH ANOTHER HEALTH CARE PROVIDER:  2 0    REVIEW AND/OR ORDER OF OF CLINICAL LAB TESTS: 1  Reviewed lab testing. .    REVIEW AND/OR ORDER OF RADIOLOGY TESTS: 1 reviewed brain MRI.    REVIEW AND/OR ORDER OF MEDICAL TESTS (EKG/ECHO/COLONOSCOPY/EGD): 1  0    INDEPENDENT  VISUALIZATION OF IMAGE, TRACING, OR SPECIMEN ITSELF (2 EACH):  0     TOTAL: 3    Current Outpatient Medications   Medication Sig Dispense Refill     apixaban ANTICOAGULANT (ELIQUIS) 5 mg Tab tablet Take 5 mg by mouth.        biotin 5,000 mcg TbDL Take by mouth.       cholecalciferol, vitamin D3, 1,000 unit tablet Take 1,000 Units by mouth daily.       cyanocobalamin 1000 MCG tablet Take 1,000 mcg by mouth daily.       furosemide (LASIX) 40 MG tablet Take 0.5 tablets (20 mg total) by mouth daily. 90 tablet 3     losartan (COZAAR) 25 MG tablet Take 1 tablet (25 mg total) by mouth daily. 90 tablet 3     melatonin 3 mg Tab tablet Take 3 mg by mouth at bedtime as needed.       metoprolol tartrate (LOPRESSOR) 25 MG tablet Take 1 tablet by mouth 2 times a day. 180 tablet 3     multivitamin with minerals (THERA-M) 9 mg iron-400 mcg Tab tablet Take 1 tablet by mouth daily.       clindamycin (CLEOCIN) 300 MG capsule Prior to dental appointments  0     COUMADIN 2.5 mg tablet TAKE ONE-HALF TO ONE TABLET (1.25 MG TO 2.5 MG) BY MOUTH DAILY. ADJUST DOSE BASED ON INR RESULTS AS DIRECTED. 60 tablet 1     pantoprazole (PROTONIX) 40 MG tablet TAKE ONE TABLET BY MOUTH ONE TIME DAILY 90 tablet 3     potassium chloride (K-DUR,KLOR-CON) 10 MEQ tablet Take 1 tablet (10 mEq total) by mouth daily. 90 tablet 3     traMADol (ULTRAM) 50 mg tablet Take 1 tablet (50 mg total) by mouth every 6 (six) hours as needed for pain. 30 tablet 0     warfarin ANTICOAGULANT (COUMADIN) 1 MG tablet Take 1 mg by mouth daily on Sunday, Tuesday, Thursday; and 2 mg by mouth daily the rest of the week.  Adjust dose based on INR results as directed. 180 tablet 1     Darrick Emery MD  Internal Medicine  [facility]

## 2021-06-16 NOTE — PROGRESS NOTES
ASSESSMENT:  1. Age-related osteoporosis without current pathological fracture  Helen's most recent bone density study was reviewed.  She has tolerated Prolia without adverse effects.  This will be continued  - Vitamin D, Total (25-Hydroxy)  - denosumab 60 mg (PROLIA 60 mg/ml); Inject 1 mL (60 mg total) under the skin once.    2. Mixed hyperlipidemia  Fasting lipids will be checked.  That is he has had poor tolerance of statins in the past  - Lipid Cascade    3. Medication monitoring encounter  Monitoring labs are ordered  - Comprehensive Metabolic Panel  - HM2(CBC w/o Differential)    4. Chronic atrial fibrillation  Heart rate is approximately 80.  She complains of fatigue which is a chronic issue.  She takes metoprolol at at bedtime.  INR will be checked.  - INR    5.  Gastroesophageal reflux: Symptoms are stable with Protonix    PLAN:  1.  Prolia today.  Next injection in 6 months  2.  Labs as outlined above.  She will be notified of test results.  3.  See in 6 months or as needed    Orders Placed This Encounter   Procedures     Comprehensive Metabolic Panel     Lipid Cascade     Order Specific Question:   Fasting is required?     Answer:   Unknown     HM2(CBC w/o Differential)     Vitamin D, Total (25-Hydroxy)     There are no discontinued medications.  Administrations This Visit     denosumab 60 mg (PROLIA 60 mg/ml)     Admin Date Action Dose Route Administered By             02/12/2018 Given 60 mg Subcutaneous Zina Roman MA                        No Follow-up on file.    ASSESSED PROBLEMS:  1. Age-related osteoporosis without current pathological fracture  Vitamin D, Total (25-Hydroxy)    denosumab 60 mg (PROLIA 60 mg/ml)   2. Mixed hyperlipidemia  Lipid Cascade   3. Medication monitoring encounter  Comprehensive Metabolic Panel    HM2(CBC w/o Differential)   4. Chronic atrial fibrillation  INR       CHIEF COMPLAINT:  Chief Complaint   Patient presents with     Follow Up     3 mo f/u - fasting for labs      "INR Check       HISTORY OF PRESENT ILLNESS:  Helen is a 87 y.o. female presenting to the clinic today to address her fatigue.     Fatigue: She is feeling well, but is tired. She sleeps solidly at night. She has much to do, and no energy to do it. This is not the norm for her. She takes her metoprolol at night when she goes to sleep. She does not always take it at the same time.     Knee Pain: She wears a copper brace on her knee much of the time.     Osteopenia: She wonders how long she should take prolia injections.     REVIEW OF SYSTEMS:   All other systems are negative.    PFSH:  Pertinent history reviewed.     TOBACCO USE:  History   Smoking Status     Never Smoker   Smokeless Tobacco     Never Used       VITALS:  Vitals:    02/12/18 1207 02/12/18 1210   BP: 162/70 160/74   Patient Site: Right Arm Right Arm   Patient Position: Sitting Sitting   Cuff Size: Adult Regular Adult Regular   Pulse: 88    Temp: 97.3  F (36.3  C)    SpO2: 98%    Weight: 132 lb (59.9 kg)    Height: 5' 1.25\" (1.556 m)      Wt Readings from Last 3 Encounters:   02/12/18 132 lb (59.9 kg)   11/20/17 135 lb 9.6 oz (61.5 kg)   11/10/17 135 lb (61.2 kg)     Body mass index is 24.74 kg/(m^2).    PHYSICAL EXAM:  Constitutional:   Reveals a alert.  Vitals: per nursing notes.  Second BP Recorded by Physician: 140/72  HEENT: atraumatic Ears:  External canals, TMs clear.    Eyes:  EOMs full, PERRL.  Neck:  Supple, no carotid bruits or adenopathy.  Back:  No spine or CVA pain.  Thorax:  No bony deformities.  Lungs: Clear to A&P without rales or wheezes.  Respiratory effort normal.  Cardiac:   Regular rate and rhythm, normal S1, S2, no murmur or gallop. irregularly irregular rhythm rate about 80  Abdomen:  Soft, active bowel sounds without bruits, mass, or tenderness.  Extremities:   No peripheral edema, pulses in the feet intact.    Skin:  No jaundice, peripheral cyanosis or lesions to suggest malignancy.  Neuro:  Alert pleasant and oriented. Cranial " nerves, motor, sensory exams are intact.  No gross focal deficits.  Detailed exam not done.     QUALITY MEASURES:    ADDITIONAL HISTORY SUMMARIZED (2): None.   DECISION TO OBTAIN EXTRA INFORMATION (1): None   RADIOLOGY TESTS (1): Reviewed DXA from 01/23/2017, moved from osteoporosis designation to osteopenia.   LABS (1): Ordered, Reviewed labs, INR, vitamin D.  MEDICINE TESTS (1): None  INDEPENDENT REVIEW (2 each): None     Total data points: 2    The visit lasted a total of 23 minutes face to face with the patient. Over 50% of the time was spent counseling and educating the patient about fatigue.    I, Tiesha Osborne, am scribing for and in the presence of, Dr. Delaney.    I, Dr. Delaney, personally performed the services described in this documentation, as scribed by Tiesha Osborne in my presence, and it is both accurate and complete.    Dragon dictation was used for this note.  Speech recognition errors are a possibility.    MEDICATIONS:  Current Outpatient Prescriptions   Medication Sig Dispense Refill     clindamycin (CLEOCIN) 300 MG capsule Prior to dental appointments  0     COUMADIN 2.5 mg tablet TAKE ONE-HALF TO ONE TABLET (1.25 MG TO 2.5 MG) BY MOUTH DAILY. ADJUST DOSE BASED ON INR RESULTS AS DIRECTED. 90 tablet 1     digoxin (LANOXIN) 125 mcg tablet Take 1 tablet (125 mcg total) by mouth daily. 30 tablet 11     furosemide (LASIX) 20 MG tablet TAKE ONE TABLET BY MOUTH TWICE DAILY  (Patient taking differently: TAKE ONE TABLET BY MOUTH DAILY) 90 tablet 3     metoprolol succinate (TOPROL-XL) 50 MG 24 hr tablet TAKE ONE TABLET BY MOUTH ONE TIME DAILY  90 tablet 1     mometasone (ELOCON) 0.1 % cream Apply to affected area twice daily until resoled 15 g 1     pantoprazole (PROTONIX) 40 MG tablet TAKE 1 TABLET (40 MG) BY MOUTH DAILY 90 tablet 3     potassium chloride SA (K-DUR,KLOR-CON) 10 MEQ tablet Take 1 tablet (10 mEq total) by mouth 2 (two) times a day. 180 tablet 3     Current Facility-Administered  Medications   Medication Dose Route Frequency Provider Last Rate Last Dose     denosumab 60 mg (PROLIA 60 mg/ml)  60 mg Subcutaneous Q6 Months Surya Delaney MD

## 2021-06-16 NOTE — TELEPHONE ENCOUNTER
Telephone Encounter by Tiesha Keller at 3/14/2019  2:31 PM     Author: Tiesha Keller Service: -- Author Type: --    Filed: 3/14/2019  2:32 PM Encounter Date: 3/13/2019 Status: Signed    : Tiesha Keller APPROVED:    Approval start date: 2/14/19  Approval end date: 3/14/20    Pharmacy has been notified of approval and will contact patient when medication is ready for pickup.

## 2021-06-16 NOTE — TELEPHONE ENCOUNTER
Orders being requested: Physical Therapy, two times a week for three weeks  Reason service is needed/diagnosis: Balance, gait, safety in the community, stroke education  When are the orders needed by?: Today  Where to send Orders: Verbal order is ok.   Okay to leave detailed message?  No need to call back.  Dong was given verbal okay from patient's provider for requested orders.    Suzie Foster LPN.  Clinical Messaging Team @ [facility]

## 2021-06-16 NOTE — TELEPHONE ENCOUNTER
ANTICOAGULATION  MANAGEMENT PROGRAM    Helen Beckham is being discharged from the [facility] Anticoagulation Management Program (AC).    Reason for discharge: warfarin replaced by alternate therapy, eliquis    ACM referral closed, anticoagulation episode resolved and INR standing order discontinued.     If Helen needs warfarin management in the future, please send a new referral.    Jack Curtis RN

## 2021-06-16 NOTE — TELEPHONE ENCOUNTER
Telephone Encounter by Cele Nolan at 3/27/2020  9:48 AM     Author: Cele Nolan Service: -- Author Type: --    Filed: 3/27/2020  9:49 AM Encounter Date: 3/26/2020 Status: Signed    : Cele Nolan APPROVED:    Approval start date: 02/27/2020  Approval end date:  03/27/2021    Pharmacy has been notified of approval and will contact patient when medication is ready for pickup.

## 2021-06-16 NOTE — PROGRESS NOTES
ASSESSMENT:  1.  Hospital follow-up:  Helen was hospitalized at [place] from 3/5 to 3/8 after presenting with slurred speech, right facial droop, and right-sided weakness.  MRI scan showed an acute to subacute infarction in the anterior aspect of the left basal ganglia.  Only minor vascular disease was noted on MRA.  She did have a subtherapeutic INR.  CVA was felt to most likely be embolic.  She was discharged to a TCU and is now back at home.  She has made an excellent recovery.  No significant residual deficit is noted.  She will have a driving assessment prior to considering driving again.    2.  Atrial fibrillation:  She was switched from warfarin to Eliquis during her hospital stay.  Insurance coverage for Eliquis is excellent.  She has not had any bleeding issues with anticoagulation in the past.  Therefore, I do not see a major need to consider watchman.    3.  History of pacemaker placement.    4.  Diastolic congestive heart failure:  She seems well compensated on current regimen    5.  Chronic renal insufficiency stage III:  Renal function studies will be rechecked.    6.  Esophageal reflux :  She was taken off of pantoprazole during her recent hospitalization.  She has noted some recurrence of heartburn off the medication.  She was advised to recheck it.    7.  Osteoporosis:  She was taken off of her vitamin D supplement during her recent hospitalization.  Her level was at the high end of normal at 77.5.  In OTC supplement of 1000 IU daily was advised    8.  Skin lesion under left eye:  This seems somewhat suspicious for basal cell cancer.  A dermatology check is recommended      PLAN:  1.  Driving assessment as already planned.    2.  Continue Eliquis.  Dose was decreased from 5 mg twice daily to 2.5 mg twice daily by Dr. Lara.  I agree with the lower dose.    3.  Continue other medications at current doses.  See in 3 months or as needed.    4.  Post Discharge Medication Reconciliation Status:  Medications from hospitalization were reconciled.  With changes as noted.    5.  Restart pantoprazole 40 mg daily.  Restart vitamin D 1000 IU daily.    6.  Dermatology check to evaluate lesion under left eye    Orders Placed This Encounter   Procedures     Basic Metabolic Panel     Medications Discontinued During This Encounter   Medication Reason     COUMADIN 2.5 mg tablet Alternate therapy     warfarin ANTICOAGULANT (COUMADIN) 1 MG tablet Alternate therapy     apixaban ANTICOAGULANT (ELIQUIS) 5 mg Tab tablet Dose adjustment     furosemide (LASIX) 40 MG tablet Dose adjustment     pantoprazole (PROTONIX) 40 MG tablet Reorder       Return in about 3 months (around 7/20/2021) for Recheck.    ASSESSED PROBLEMS:  1. Chronic atrial fibrillation (H)  apixaban ANTICOAGULANT (ELIQUIS) 2.5 mg Tab tablet   2. Cerebrovascular accident (CVA) due to embolism of cerebral artery (H)     3. Stage 3a chronic kidney disease  Basic Metabolic Panel   4. Gastroesophageal reflux disease  pantoprazole (PROTONIX) 40 MG tablet   5. Edema, unspecified type  furosemide (LASIX) 20 MG tablet   6. Diuretic-induced hypokalemia  Basic Metabolic Panel       CHIEF COMPLAINT:  Chief Complaint   Patient presents with     Follow-up     Medication Management     Discuss with MD       HISTORY OF PRESENT ILLNESS:  Helen is a 89 y.o. female seen for follow-up after recent hospitalization at [place].  She was brought to [place] on 3/5 with slurred speech, right facial droop, and right-sided weakness.  MRI showed evidence of an acute to subacute infarct in the anterior aspect of the left basal ganglia.  MRA showed only minor atherosclerotic disease.  She does have chronic atrial fib and did have a subtherapeutic INR.  CVA was felt to most likely be embolic.  She was switched from warfarin to Eliquis during her hospitalization.  She reports her insurance coverage for Eliquis is excellent.  She was discharged to a TCU, and now has returned home.  She has made an  excellent neurologic recovery.  She has not yet returned to driving and does have a driving assessment scheduled.    She saw Dr. Lara in cardiology follow-up after discharge.  Eliquis was decreased from 5 to 2.5 mg twice daily.  She does have a pacemaker.  She has been diagnosed with chronic diastolic heart failure.  She seems well compensated.  She has on furosemide.    Other medical issues have been stable    REVIEW OF SYSTEMS:    Comprehensive review of systems is otherwise negative.    PFSH:  .  Seen with her son    TOBACCO USE:  Social History     Tobacco Use   Smoking Status Never Smoker   Smokeless Tobacco Never Used       VITALS:  Vitals:    04/20/21 1210   BP: 124/72   Patient Site: Left Arm   Patient Position: Sitting   Cuff Size: Adult Regular   Pulse: 88   SpO2: 98%   Weight: 132 lb (59.9 kg)     Wt Readings from Last 3 Encounters:   04/20/21 132 lb (59.9 kg)   03/22/21 133 lb (60.3 kg)   12/08/20 136 lb (61.7 kg)       PHYSICAL EXAM:  Constitutional:   Reveals an alert pleasant elderly woman who ambulates with a single prong cane.    Vitals: per nursing notes.  HEENT: Atraumatic  Eyes:  EOMs full, PERRL.  Oropharynx:   Mouth and throat clear, no thrush or exudate.  Neck:  Supple, no carotid bruits or adenopathy.  Back:  No spine or CVA pain.  Mild scoliosis  Thorax:  No bony deformities.  Pacer left chest  Lungs: Clear to A&P without rales or wheezes.  Respiratory effort normal.  Cardiac:   Regular rate and rhythm, normal S1, S2, no murmur or gallop.  Abdomen:  Soft, active bowel sounds without bruits, mass, or tenderness..  Extremities:   Trace peripheral edema, pulses in the feet intact.    Skin: Oval 3 mm pearly lesion below the left eye.  No ulceration  Neuro:  Alert and oriented. Cranial nerves, motor, sensory exams are intact.  No gross focal deficits.  No dysarthria or aphasia   psychiatric:  Memory intact, mood appropriate.    DATA REVIEWED:  Additional History from Old Records  Summarized (2): Hospital records from [place] reviewed.  Cardiology clinic records reviewed  Decision to Obtain Records (1): None.   Radiology Tests Summarized or Ordered (1): MRI/MRA reviewed  Labs Reviewed or Ordered (1): Labs reviewed and ordered  Medicine Test Summarized or Ordered (1): Echocardiogram from 3/5/2021 reviewed  Independent Review of EKG or X-RAY(2 each): None.    The visit lasted a total of 30 minutes face to face with the patient. Over 50% of the time was spent counseling and educating the patient about management of embolic CVA and chronic atrial fibrillation.    Dragon dictation was used for this note. Speech recognition errors are a possibility.     MEDICATIONS:  Current Outpatient Medications   Medication Sig Dispense Refill     apixaban ANTICOAGULANT (ELIQUIS) 2.5 mg Tab tablet Take 1 tablet (2.5 mg total) by mouth 2 (two) times a day. 180 tablet 3     biotin 5,000 mcg TbDL Take by mouth.       cholecalciferol, vitamin D3, 1,000 unit tablet Take 1,000 Units by mouth daily.       clindamycin (CLEOCIN) 300 MG capsule Prior to dental appointments  0     cyanocobalamin 1000 MCG tablet Take 1 tablet (1,000 mcg total) by mouth daily. 90 tablet 3     furosemide (LASIX) 20 MG tablet Take 1 tablet (20 mg total) by mouth daily. 90 tablet 3     losartan (COZAAR) 25 MG tablet Take 1 tablet (25 mg total) by mouth daily. 90 tablet 3     melatonin 3 mg Tab tablet Take 3 mg by mouth at bedtime as needed.       metoprolol tartrate (LOPRESSOR) 25 MG tablet Take 1 tablet by mouth 2 times a day. 180 tablet 3     multivitamin with minerals (THERA-M) 9 mg iron-400 mcg Tab tablet Take 1 tablet by mouth daily.       pantoprazole (PROTONIX) 40 MG tablet Take 1 tablet (40 mg total) by mouth daily. 90 tablet 3     potassium chloride (K-DUR,KLOR-CON) 10 MEQ tablet Take 1 tablet (10 mEq total) by mouth daily. 90 tablet 3     No current facility-administered medications for this visit.

## 2021-06-16 NOTE — TELEPHONE ENCOUNTER
Orders being requested:  Skilled Nursing, two visits within four days; Skilled Nursing once a week for two weeks and one to five as needed visits.   Reason service is needed/diagnosis: Recent CVA  When are the orders needed by?: Today  Where to send Orders: Phone:  Verbal orders are ok  Okay to leave detailed message?  Yes    Prema was given verbal okay from patient's provider for requested orders.    Suzie Foster LPN.  Clinical Messaging Team @ [facility]

## 2021-06-16 NOTE — TELEPHONE ENCOUNTER
- Currently on Eliquis 5mg two times a day since 3/15/21.     - hospitalized @ [facility] from 3/8 - 16/21 for CVA

## 2021-06-16 NOTE — PATIENT INSTRUCTIONS - HE
1.  Restart pantoprazole for heartburn.    2.  Restart Vitamin D 1000 international unit(s) daily    3.  Check BMP    4.  See in three months    5.  Dermatology check for lesion under left eye

## 2021-06-16 NOTE — TELEPHONE ENCOUNTER
Telephone Encounter by Jack Curtis RN at 7/2/2019  2:48 PM     Author: Jack Curtis RN Service: -- Author Type: Registered Nurse    Filed: 7/2/2019  2:53 PM Encounter Date: 7/2/2019 Status: Attested    : Jack Curtis RN (Registered Nurse) Cosigner: Darrick Emery MD at 7/3/2019 10:54 AM    Attestation signed by Darrick Emery MD at 7/3/2019 10:54 AM    Darrick Emery MD                ANTICOAGULATION  MANAGEMENT    Assessment     Today's INR result of 3.4 is Subtherapeutic (goal INR of 2.0-3.0)        Warfarin taken as previously instructed    No new diet changes affecting INR    No new medication/supplements affecting INR    Continues to tolerate warfarin with no reported s/s of bleeding or thromboembolism     Previous INR was Supratherapeutic    Plan:     Left a detailed message for Helen regarding INR result and instructed:     Warfarin Dosing Instructions:  skip today then change warfarin dose to 2 mg daily on sun/thu; and 1.25 mg daily rest of week  (12 % change)    Instructed patient to follow up no later than: two weeks        Instructed to call the [facility] for any changes, questions or concerns. (#[phone])   ?   Jack Curtis RN    Subjective/Objective:      Helen Beckham, a 88 y.o. female is on warfarin.     Helen reports:     Home warfarin dose: as updated on anticoagulation calendar per template     Missed doses: No     Medication changes:  No     S/S of bleeding or thromboembolism:  No     New Injury or illness:  No     Changes in diet or alcohol consumption:  No     Upcoming surgery, procedure or cardioversion:  No    Anticoagulation Episode Summary     Current INR goal:   2.0-3.0   TTR:   75.1 % (4.6 y)   Next INR check:   7/16/2019   INR from last check:   3.40! (7/2/2019)   Weekly max warfarin dose:      Target end date:      INR check location:      Preferred lab:      Send INR reminders to:   [facility]    Indications    Chronic atrial fibrillation (H)  [I48.2]           Comments:            Anticoagulation Care Providers     Provider Role Specialty Phone number    Surya Delaney MD Referring Internal Medicine [phone]

## 2021-06-17 NOTE — TELEPHONE ENCOUNTER
Telephone Encounter by Jack Curtis RN at 2/9/2021  2:05 PM     Author: Jack Curtis RN Service: -- Author Type: Registered Nurse    Filed: 3/8/2021  3:53 PM Encounter Date: 2/9/2021 Status: Addendum    : Jack Curtis RN (Registered Nurse)    Related Notes: Original Note by Jack Curtis RN (Registered Nurse) filed at 2/9/2021  2:12 PM       ANTICOAGULATION  MANAGEMENT    Assessment     Today's INR result of 3.2 is Supratherapeutic (goal INR of 2.0-3.0)        Warfarin taken as previously instructed    No new diet changes affecting INR    No new medication/supplements affecting INR    Continues to tolerate warfarin with no reported s/s of bleeding or thromboembolism     Previous INR was Therapeutic    Plan:     Spoke on phone with Helen regarding INR result and instructed:      Warfarin Dosing Instructions:  hold today then continue current warfarin dose 1 mg daily on sun/tue/thu; and 2 mg daily rest of week  (0 % change)    Instructed patient to follow up no later than: two weeks    Helen verbalizes understanding and agrees to warfarin dosing plan.    Instructed to call the [facility] for any changes, questions or concerns. (#[phone])   ?   Jack Curtis RN    Subjective/Objective:      Helen Beckham, a 89 y.o. female is on warfarin. Helen Cervantes reports:     Home warfarin dose: as updated on anticoagulation calendar per template     Missed doses: No     Medication changes:  No     S/S of bleeding or thromboembolism:  No     New Injury or illness:  No     Changes in diet or alcohol consumption:  No     Upcoming surgery, procedure or cardioversion:  No    Anticoagulation Episode Summary     Current INR goal:  2.0-3.0   TTR:  59.6 % (1 y)   Next INR check:  2/25/2021   INR from last check:  3.20 (2/9/2021)   Weekly max warfarin dose:     Target end date:     INR check location:     Preferred lab:     Send INR reminders to:  [facility]    Indications    Chronic atrial fibrillation  (H) [I48.20]           Comments:           Anticoagulation Care Providers     Provider Role Specialty Phone number    Surya Delaney MD Referring Internal Medicine [phone]

## 2021-06-17 NOTE — TELEPHONE ENCOUNTER
Telephone Encounter by Jack Curtis RN at 10/13/2020  4:51 PM     Author: Jack Curtis RN Service: -- Author Type: Registered Nurse    Filed: 10/13/2020  4:58 PM Encounter Date: 10/13/2020 Status: Signed    : Jack Curtis RN (Registered Nurse)       ANTICOAGULATION  MANAGEMENT    Assessment     Today's INR result of 3.4 is Supratherapeutic (goal INR of 2.0-3.0)        Warfarin taken as previously instructed    No new diet changes affecting INR    No new medication/supplements affecting INR    Continues to tolerate warfarin with no reported s/s of bleeding or thromboembolism     Previous INR was Therapeutic    Plan:     Spoke on phone with Helen regarding INR result and instructed:     Warfarin Dosing Instructions:  hold today then change warfarin dose to 1 mg daily on sun/tue/thu; and 1 mg daily rest of week  (0 % change)    Instructed patient to follow up no later than: two weeks    Helen verbalizes understanding and agrees to warfarin dosing plan.    Instructed to call the [facility] for any changes, questions or concerns. (#[phone])   ?   Jack Curtis RN    Subjective/Objective:      Helenhi Beckham, a 89 y.o. female is on warfarin.     Helen reports:     Home warfarin dose: as updated on anticoagulation calendar per template     Missed doses: No     Medication changes:  No     S/S of bleeding or thromboembolism:  No     New Injury or illness:  No     Changes in diet or alcohol consumption:  No     Upcoming surgery, procedure or cardioversion:  No    Anticoagulation Episode Summary     Current INR goal:  2.0-3.0   TTR:  61.2 % (1 y)   Next INR check:  10/27/2020   INR from last check:  3.40 (10/13/2020)   Weekly max warfarin dose:     Target end date:     INR check location:     Preferred lab:     Send INR reminders to:  [facility]    Indications    Chronic atrial fibrillation (H) [I48.20]           Comments:           Anticoagulation Care Providers     Provider Role Specialty Phone  number    Surya Delaney MD [facility] Internal Medicine [phone]

## 2021-06-17 NOTE — PROGRESS NOTES
[place] [facility] study Physical Exam  If findings abnormal, please explain  Physical Exam:        Normal  Abnormal Not Done  General Appearance [x]      []    []   HEENT   [x] wears eye glasses    []    []   Chest    [x]      []    []   Heart    []      [x]  Irregular heart rate (afib)  []   Abdomen   [x]      []    []   Musculoskeletal  []      [x]  Bilateral lower extremity edema  []   Neurologic   [x]      []    []     Dermatologic  [x]      []    []             Absent  Present  Tremor   [x]      []    If present, score 0-10      0 is no tremor, 1 to 3 as mild, tremor, 4 to 6 as moderate      tremor, 7 to 9 as severe tremor, and 10 as extremely      severe tremor   Tattoos on sensor locations? [] Yes    [x]  No      (i.e., wrists)     EKG shows afib with RBBB some T wave abnormalities but no significant change compared to previous EKGs. Patient denies Chest pain, SOB, PND, Orthopnea, Lightheaded or dizziness, or heart palpitation. Has mild edema in LE bilaterally and monitors her BP at home. She is due to see Dr. Mathew in May but doesn't have appointment. Will request Suzi (scheduling) to assist patient with appointment.    Ethan Adler NP

## 2021-06-17 NOTE — PROGRESS NOTES
[place] [facility] study Physical Exam  If findings abnormal, please explain  Physical Exam:        Normal  Abnormal Not Done  General Appearance [x]      []    []   HEENT   [x]      []    []   Chest    [x]      []    []   Heart    [] [x] Irregular heart rate  (afib)  []   Abdomen   [x]      []    []   Musculoskeletal  []      [x]  Lower extremity edema  []   Neurologic   [x]      []    []     Dermatologic  [x]      []    []             Absent  Present  Tremor   [x]      []    If present, score 0-10      0 is no tremor, 1 to 3 as mild, tremor, 4 to 6 as moderate      tremor, 7 to 9 as severe tremor, and 10 as extremely      severe tremor   Tattoos on sensor locations? [] Yes    [x]  No      (i.e., wrists)    EKG showed Afib with RBBB with some T wave abnormalities but no significant changed compared to previous EKGs. Patient denies any chest pain, heart palpitation, SOB, PND, Orthopnea, lightheadedness or dizziness. Patient was encouraged to seek medical attention or call if having symptoms of chest pain, SOB< decrease exercise tolerance, fatigue, lightheaded or dizziness. Agreed with plan. She is due to see Dr. Mathew in May doesn't have appointment. Will request Suzi to assist with scheduling.    Addendum: Reviewed EKG with Dr. Lopes about EKG changes in T waves. Agreed with above plan.      Ethan Adler NP

## 2021-06-17 NOTE — TELEPHONE ENCOUNTER
Telephone Encounter by Jack Curtis RN at 2/23/2021  3:40 PM     Author: Jack Curtis RN Service: -- Author Type: Registered Nurse    Filed: 2/23/2021  4:14 PM Encounter Date: 2/23/2021 Status: Signed    : Jack Curtis RN (Registered Nurse)       ANTICOAGULATION  MANAGEMENT    Assessment     Today's INR result of 3.7 is Supratherapeutic (goal INR of 2.0-3.0)        Warfarin taken as previously instructed    No new diet changes affecting INR    No new medication/supplements affecting INR now getting generic warfarin    Continues to tolerate warfarin with no reported s/s of bleeding or thromboembolism     Previous INR was Supratherapeutic    Plan:     Spoke on phone with Helen regarding INR result and instructed:      Warfarin Dosing Instructions:  skip today then change warfarin dose to 2 mg daily on mon/wed/fri; and 1 mg daily rest of week  (9 % change)    Instructed patient to follow up no later than: 3/5 scheduled      Helen verbalizes understanding and agrees to warfarin dosing plan.    Instructed to call the [facility] for any changes, questions or concerns. (#[phone])   ?   Jack Curtis RN    Subjective/Objective:      Helenhi Beckham, a 89 y.o. female is on warfarin. Helen Cervantes reports:     Home warfarin dose: verbally confirmed home dose with Helen and updated on anticoagulation calendar     Missed doses: No     Medication changes:  No     S/S of bleeding or thromboembolism:  No     New Injury or illness:  No     Changes in diet or alcohol consumption:  No     Upcoming surgery, procedure or cardioversion:  No    Anticoagulation Episode Summary     Current INR goal:  2.0-3.0   TTR:  55.8 % (1 y)   Next INR check:  3/9/2021   INR from last check:  3.70 (2/23/2021)   Weekly max warfarin dose:     Target end date:     INR check location:     Preferred lab:     Send INR reminders to:  [facility]    Indications    Chronic atrial fibrillation (H) [I48.20]           Comments:            Anticoagulation Care Providers     Provider Role Specialty Phone number    Surya Delaney MD Referring Internal Medicine [phone]

## 2021-06-17 NOTE — PROGRESS NOTES
ASSESSMENT:  1.  Right hip pain:  Symptoms have been present for 1 week.  Exam is consistent with greater trochanteric bursitis.  She has had this in the past.  Management options were discussed.  She is interested in a steroid injection.    2.  History of cerebrovascular accident:  This presumably was embolic and related to a subtherapeutic INR.  She is now on Eliquis and doing well.    3.  Essential hypertension:  Initial blood pressure was elevated to 168/74.  On recheck blood pressure looks good at 130/62    PLAN:  1.  After informed consent, and sterilely prepping, the right greater trochanter area was infiltrated with 2 cc of 1% lidocaine and 80 mg of Depo-Medrol.  She tolerated the procedure well.    2.  Continue your other medications at current doses.  Report the effect of the injection on pain in 2 weeks.    3.  See in 3 months or as needed      There are no discontinued medications.  Administrations This Visit     lidocaine 10 mg/mL (1 %) injection 2 mL     Admin Date  05/17/2021 Action  Given Dose  2 mL Route  Intradermal Administered By  Surya Delaney MD          methylPREDNISolone acetate injection 80 mg (DEPO-MEDROL)     Admin Date  05/17/2021 Action  Given Dose  80 mg Route  Intramuscular Administered By  Surya Delaney MD              Return in about 3 months (around 8/17/2021) for Recheck.    ASSESSED PROBLEMS:  1. Trochanteric bursitis of right hip  lidocaine 10 mg/mL (1 %) injection 2 mL    methylPREDNISolone acetate injection 80 mg (DEPO-MEDROL)   2. History of CVA (cerebrovascular accident)     3. Essential hypertension         CHIEF COMPLAINT:  Chief Complaint   Patient presents with     Hip Pain     (R) Hip and leg pain       HISTORY OF PRESENT ILLNESS:  Helen is a 89 y.o. female who presents complaining of pain in the right hip area.  Symptoms first began 1 week ago.  She points to the lateral aspect of the right hip and states that pain radiates down the outer aspect of the leg to the  "knee.  She previously was diagnosed with greater trochanteric bursitis, and feels symptoms are similar to what she experienced in the past.    She otherwise has done well since last visit.  She has a recent history of a CVA which presumably was an embolic and related to a subtherapeutic INR while on warfarin.  She has now switched to Eliquis.  She has not had any bleeding complications from this.    Blood pressure was high on initial check-in.  Recheck by MD is in the desired range.    She feels she is managing well at home.  She did not pass her driving test after the recent CVA according to her son.  She uses a cane for ambulation, and does not feel that she needs a walker.    REVIEW OF SYSTEMS:    Comprehensive review of systems is otherwise negative.    PFSH:  .  Lives independently.  Is followed closely by family.    TOBACCO USE:  Social History     Tobacco Use   Smoking Status Never Smoker   Smokeless Tobacco Never Used       VITALS:  Vitals:    05/17/21 1348 05/17/21 1354   BP: 160/74 138/74   130/62   Patient Site: Left Arm                right arm   Patient Position: Sitting                sitting, by MD   Cuff Size: Adult Regular    Pulse: 84    Resp: 20    SpO2: 98%    Weight: 135 lb 1 oz (61.3 kg)    Height: 5' 0.5\" (1.537 m)      Wt Readings from Last 3 Encounters:   05/17/21 135 lb 1 oz (61.3 kg)   04/20/21 132 lb (59.9 kg)   03/22/21 133 lb (60.3 kg)       PHYSICAL EXAM:  Constitutional:   Reveals an alert pleasant woman.  She ambulates fairly easily but needs mild assistance to get on the exam table.    Vitals: per nursing notes.  HEENT: Atraumatic.    Eyes: No conjunctival hyperemia  Neck:  Supple, no carotid bruits or adenopathy.  Back:  No spine or CVA pain.  Thorax:  No bony deformities.  Lungs: Clear to A&P without rales or wheezes.  Respiratory effort normal.  Cardiac:    irregularly irregular rate and rhythm, normal S1, S2, no murmur or gallop.  Extremities: No shortening or rotation of " the right leg.  Negative straight leg raise bilaterally.  Mild discomfort with internal and external rotation of both hips.  Moderate crepitance range of motion of the knees.  Well localized tenderness over right greater trochanter with no significant tenderness on the left.  Skin:  No jaundice, peripheral cyanosis or lesions to suggest malignancy.  Neuro:  Alert and oriented.   No gross focal deficits.  Psychiatric:  Memory intact, mood appropriate.    DATA REVIEWED:  Additional History from Old Records Summarized (2): Chart reviewed  Decision to Obtain Records (1): None.   Radiology Tests Summarized or Ordered (1): None.  Labs Reviewed or Ordered (1): None.  Medicine Test Summarized or Ordered (1): None.   Independent Review of EKG or X-RAY(2 each): None.    The visit lasted a total of 25 minutes face to face with the patient. Over 50% of the time was spent counseling and educating the patient about management of greater trochanteric bursitis and follow-up after CVA      Dragon dictation was used for this note. Speech recognition errors are a possibility.     MEDICATIONS:  Current Outpatient Medications   Medication Sig Dispense Refill     apixaban ANTICOAGULANT (ELIQUIS) 2.5 mg Tab tablet Take 1 tablet (2.5 mg total) by mouth 2 (two) times a day. 180 tablet 3     biotin 5,000 mcg TbDL Take by mouth.       cholecalciferol, vitamin D3, 1,000 unit tablet Take 1,000 Units by mouth daily.       clindamycin (CLEOCIN) 300 MG capsule Prior to dental appointments  0     cyanocobalamin 1000 MCG tablet Take 1 tablet (1,000 mcg total) by mouth daily. 90 tablet 3     diclofenac sodium (VOLTAREN) 1 % Gel Apply topically 4 (four) times a day.       furosemide (LASIX) 20 MG tablet Take 1 tablet (20 mg total) by mouth daily. 90 tablet 3     losartan (COZAAR) 25 MG tablet Take 1 tablet (25 mg total) by mouth daily. 90 tablet 3     melatonin 3 mg Tab tablet Take 3 mg by mouth at bedtime as needed.       metoprolol tartrate  (LOPRESSOR) 25 MG tablet Take 1 tablet by mouth 2 times a day. 180 tablet 3     multivitamin with minerals (THERA-M) 9 mg iron-400 mcg Tab tablet Take 1 tablet by mouth daily.       pantoprazole (PROTONIX) 40 MG tablet Take 1 tablet (40 mg total) by mouth daily. 90 tablet 3     potassium chloride (K-DUR,KLOR-CON) 10 MEQ tablet Take 1 tablet (10 mEq total) by mouth daily. 90 tablet 3     No current facility-administered medications for this visit.

## 2021-06-17 NOTE — TELEPHONE ENCOUNTER
Telephone Encounter by Cele Nolan at 4/1/2020  6:10 AM     Author: Cele Nolan Service: -- Author Type: --    Filed: 4/1/2020  6:16 AM Encounter Date: 3/31/2020 Status: Signed    : Cele Nolan APPROVED:    Approval start date: 02/27/2020  Approval end date:  03/27/2021    Pharmacy has been notified of approval and will contact patient when medication is ready for pickup.

## 2021-06-17 NOTE — TELEPHONE ENCOUNTER
Telephone Encounter by Jack Curtis RN at 12/15/2020  5:46 PM     Author: Jack Curtis RN Service: -- Author Type: Registered Nurse    Filed: 12/15/2020  6:03 PM Encounter Date: 12/15/2020 Status: Attested Addendum    : Jack Curtis RN (Registered Nurse)    Related Notes: Original Note by Jack Curtis RN (Registered Nurse) filed at 12/15/2020  6:03 PM    Cosigner: Surya Delaney MD at 12/15/2020  6:16 PM    Attestation signed by Surya Delaney MD at 12/15/2020  6:16 PM                      Anticoagulation Annual Referral Renewal Review    Helen Beckham's chart reviewed for annual renewal of referral to anticoagulation monitoring.        Criteria for anticoagulation nurse and/or pharmacist renewal met   Warfarin indication: Atrial Fibrillation Yes, per indication   Current with INR monitoring/compliant Yes Yes   Date of last office visit 12/8/20 Yes, had office visit within last year   Time in Therapeutic Range (TTR) 61.5 % Yes, TTR > 60%       Helen Beckham met all criteria for anticoagulation management program initiated renewal.  New INR standing orders and anticoagulation referral renewal placed.      Jack Curtis RN  5:46 PM

## 2021-06-17 NOTE — PROGRESS NOTES
"      [place] AF Study Consent Visit    Study description: Electrocardiogram Clinical Validation Study \"[place] AF\" study    Note time seated: 12:55 pm    Helen Beckham a 87 y.o. female , was seen in 2564 today to discuss participation in the [place] AF study. The consent discussion began on Tuesday May 1, 2018.    The consent form was reviewed with the patient and Mirlande Zuleta.     The consent discussion included:    Study purpose     Conflict of interest    Device description      Study visits    Risks of participation    Benefits (if any)    Alternatives    Voluntary participation    Confidentiality     Compensation/costs of participation    Study stipends    Injury and legal rights    The subject was provided time to review the consent form and consider participation. her questions were answered to her satisfaction.   The patient has voluntarily agreed to participate in the above noted study.   The consent form version and HIPPA form version 4.0_16 March 2018, [place]ulema Version 3.1;IRB approved April 27, 2018  was signed 05/01/18.    The subject was provided with a copy of the consent form and HIPPA. A copy of the signed forms was forwarded to medical records.    No study procedures were done prior to Helen Beckham providing informed consent.     Mirlande SWENSON Song      Study Data collections   Vitals  (TPBP)     Vitals:    05/01/18 1312   BP: 112/57   Patient Site: Right Arm   Patient Position: Sitting   Cuff Size: Adult Regular   Pulse: 94   Temp: 97.8  F (36.6  C)   TempSrc: Oral   Weight: 131 lb 4.8 oz (59.6 kg)   Height: 5' 1.22\" (1.555 m)      VS taken after 5 min rest   Temp in  C  Height/weight (cm/kg)   Note  time patient placed in supine position: 13:27    Ethnicity   []   or    [x]  Not  or   Race  []   or   []    []  Black, of  heritage or   []   or Other   [x]  " White  OCCUPATION:   HISTORY OF HEART RHYTHM ABNORMALITIES   []  None   []  Atrial Flutter   [] 2  AVB -Type I  [x] AF (permanent)  []  Ventricular Tachycardia  []  2  AVB -other:   []  AF (persistent   []  Atrial Tachycardia  [] 3  AVB   []  AF (paroxysmal  [] 1  AVB     []  LBBB  [] AF (other)   [] 2  AVB -Type I    []  RBBB  MEDICAL AND ALLERGY HISTORY (MHA)-with start & stop dates   Special interest allergies: active allergic skin reactions     Past Medical History:   Diagnosis Date    Dermatitis 2017     Diverticulosis 1998    Edema 2014     Esophageal reflux 2006     Hyperlipidemia 2007     Hypertension 2008     Osteoarthritis 2005     Paroxysmal atrial fibrillation 2011     Persistent atrial fibrillation 2015     Venous insufficiency 2016        Allergies   Allergen Reactions     Atorvastatin      Bee Sting Kit      Codeine      Other Allergy (See Comments)      Contrast Media Ready-Box MISC, 04/28/2008.       Penicillins      Simvastatin Myalgia        Current Outpatient Prescriptions:      COUMADIN 1 mg tablet, Take 1 to 2 tablets (1 to 2 mg) by mouth daily. Adjust dose based on INR results as directed., Disp: 90 tablet, Rfl: 1 Started 2014  - Atrial Fibrillation     COUMADIN 2.5 mg tablet, TAKE ONE-HALF TO ONE TABLET (1.25 MG TO 2.5 MG) BY MOUTH DAILY. ADJUST DOSE BASED ON INR RESULTS AS DIRECTED., Disp: 90 tablet, Rfl: 1 Started 2014 - Atrial Fibrillation     digoxin (LANOXIN) 125 mcg tablet, Take 1 tablet (125 mcg total) by mouth daily., Disp: 30 tablet, Rfl: 11 Started 07/25/2017 - Atrial Fibrillation     furosemide (LASIX) 20 MG tablet, TAKE ONE TABLET BY MOUTH TWICE DAILY  (Patient taking differently: TAKE ONE TABLET BY MOUTH DAILY), Disp: 90 tablet, Rfl: 3 Started 06/22/2017 - Atrial Fibrillation     metoprolol succinate (TOPROL-XL) 50 MG 24 hr tablet, TAKE ONE TABLET BY MOUTH ONE TIME DAILY , Disp: 90 tablet, Rfl: 1 Started 07/19/2017  - Hypertension     mometasone (ELOCON) 0.1 %  cream, Apply to affected area twice daily until resoled, Disp: 15 g, Rfl: 1 Started 11/20/2017 - Cream leg -  dry legs     pantoprazole (PROTONIX) 40 MG tablet, TAKE ONE TABLET BY MOUTH ONE TIME DAILY , Disp: 90 tablet, Rfl: 2 Started - 2006 - Esophageal     potassium chloride SA (K-DUR,KLOR-CON) 10 MEQ tablet, Take 1 tablet (10 mEq total) by mouth 2 (two) times a day., Disp: 180 tablet, Rfl: 3 Started 2017  - General Health    ECG done; reviewed by & PE done by Ethan Adler  Lifestyle  Tobacco/nicotine  [x]  Never  [] Rarely  []  Occasionally  []  Frequently  [] Daily   Alcohol   [x]  Never  [] Rarely  []  Occasionally  []  Frequently  [] Daily    [] Daily-Heavy option  Recreational drug use  [x]  Never  [] Rarely  []  Occasionally  []  Frequently  [] Daily   Caffeine  []  Never  [] Rarely  []  Occasionally  []  Frequently  [x] Daily    [] Daily-Heavy option  Exercise  []  Never  [] Rarely  []  Occasionally  []  Frequently  [x] Daily       Dominant hand [] left  [x] right [] ambidextrous  Preferred Wrist to wear band on   [x]  left   []  right []  neither [] both  Were screening Day & study day: [x]  same [] different   Same: wrist circumference:      140  mm   Device wearing wrist skin fold thickness:       5.6 mm  Device wearing wrist hairiness:     [x]  Fine, low density []  Thick, low density   []  Fine, high density []  Thick, high density  Pregnancy test  for WOCBP    [x] n/a male or female not child bearing potential  Device Set up and Data collection  CS Laptop ID  4  CS Belt   Wrist device laptop ID 30  Wrist device   Wrist device size [x]  small []  large  Snug band notch 4  Wrist device worn on []  right [x]  left  Device location: [] dorsal and distal  [x]  Dorsal and proximal     []  Palmar and distal []  Palmar and proximal  Wrist device orientation []  proximal  [x] distal  IN-STUDY NOTES  05/01/18  Time: 14:40   Wrist device interaction   [x]  finger []  finger & thumb  []  other:   #  of practice runs (no more than 6) 3   Ease of use   DCD rating     [] 1 (Unable to use)    []  2 (Below average ease of use)    []  3 (Average Ease of use)    [] 4 (above average ease of use)    [x]  5 (easiest to use)  TomTom Touch rating    []  1 (Unable to use)    []  2 (Below average ease of use)    []  3 (Average Ease of  use)    [] 4 (above average ease of use)    [x]  5 (easiest to use)  [employer]ive[employer] Kardia Mobile Rating    []  1 (Unable to use)    []  2 (Below average ease of use)    []  3 (Average Ease of  use)    [] 4 (above average ease of use)    [x]  5 (easiest to use)  Subject has now completed their participation in the [facility] AF study.  Mirlande Zuleta

## 2021-06-17 NOTE — PROGRESS NOTES
[place] Study Inclusion / Exlcusion Criteria  Protocol Version 8    Inclusion Criteria    Yes No  Subjects must meet all the following inclusion criteria to be enrolled:   [x] [] 1 Able to read, understand, and provide written informed consent   [x] [] 2 Willing and able to participate in the study procedures as described in the consent form   [x] [] 3 Individuals who are 22 years of age and older   [x] [] 4 Able to communicate effectively with and follow instructions from the study staff   [x] [] 5 Have a wrist circumference between 130 mm and 245 mm (measured at  band center  on the preferred wrist. This location is determined by asking the volunteer to put on a normal wrist-watch and marking the skin with a pen/marker to outline the edges of the band.)   Exclusion criteria   Subjects who meet any of the following criteria may not be enrolled:   Yes No     [] [x] 1 Physical disability that precludes safe and adequate testing    [] [x] 2 Mental impairment resulting in limited ability to cooperate    [] [x] 3 Subjects with a pacemaker or implantable cardioverter-defibrillator (ICD)   [] [x] 4 Acute myocardial infarction (MI) within 90 days of screening or other cardiovascular disease that, in the opinion of the Investigator, increases the risk to the subject or renders data uninterpretable (e.g., recent or ongoing unstable angina, significant valvular heart disease or heart failure, myocarditis or pericarditis)   [] [x] 5 Acute pulmonary embolism, pulmonary infarction, or deep vein thrombosis within 90 days of screening   [] [x] 6 Stroke or transient ischemic attack within 90 days of screening    [] [x] 7 Subjects taking rhythm control drugs (e.g., disopyramide, quinidine, flecainide, propafenone, amiodarone, dofetilide, dronedarone, sotalol, procainamide, ibutilide, moricizine, procainamide).   An exception will be made for patients who are undergoing scheduled cardioversion for known AF within 30 days after  study participation. These study participants will be allowed to participate in the study even when on rhythm control drugs. Rate control and anti-coagulants, anti-platelet medications are permitted (e.g., metoprolol, atenolol, diltiazem, coumadin, clopidogrel, aspirin)      Rate control and anti-coagulants, anti-platelet medications are permitted (e.g., metoprolol, atenolol, diltiazem, coumadin, clopidogrel, aspirin)   [] [x] 8 Symptomatic (or active) allergic skin reactions such as eczema, rosacea, impetigo, dermatomyositis or allergic contact dermatitis on both wrists or over electrode attachment sites   [] [x] 9 Known sensitivity to medical adhesives, isopropyl alcohol, watch bands, or electrocardiogram (ECG) electrodes including known allergy or sensitivity to fluoroelastomer bands primarily used in wrist worn fitness devices   [] [x] 10 A history of abnormal life-threatening rhythms as determined by the investigator (e.g., ventricular tachycardia, ventricular fibrillation, 3rd-degree heart block, resting heart rate < 50 beats per minute (bpm), resting heart rate >110 bpm)   [] [x] 11 Significant tremor that prevents subject from being able to hold still   [] [x] 12 Pregnant women: Women who are pregnant at the time of study participation   [] [x] 13 Individuals who have participated in an ECG device study in the past 9 months in the [facility]   [] [x] 14 Occupational exclusion: Employees of the following types of companies       *Technology-focused media companies (e.g., television, magazines, newspapers,       *Health, wellness, or fitness device companies (e.g., step, sleep, or ECG monitors, or general  fitness bands )        Mirlande Zuleta    Above reviewed, agree.

## 2021-06-17 NOTE — TELEPHONE ENCOUNTER
Telephone Encounter by Esther Bonilla RN at 4/6/2020  1:50 PM     Author: Esther Bonilla RN Service: -- Author Type: Registered Nurse    Filed: 4/6/2020  1:55 PM Encounter Date: 4/6/2020 Status: Attested    : Esther Bonilla RN (Registered Nurse) Cosigner: Surya Delaney MD at 4/6/2020  2:51 PM    Attestation signed by Surya Delaney MD at 4/6/2020  2:51 PM                      ANTICOAGULATION  MANAGEMENT    Assessment     Today's INR result of 3.0 is Therapeutic (goal INR of 2.0-3.0)        Warfarin taken as previously instructed    No new diet changes affecting INR    No new medication/supplements affecting INR    Continues to tolerate warfarin with no reported s/s of bleeding or thromboembolism     Previous INR was Therapeutic since Dec 2019    Plan:     Spoke with Helen regarding INR result and instructed:     Warfarin Dosing Instructions:  Continue current warfarin dose 1.25 mg daily on Sat; and 2 mg daily rest of week      Instructed patient to follow up no later than: 6 weeks    Education provided: importance of consistent vitamin K intake, target INR goal and significance of current INR result, importance of notifying clinic for changes in medications, monitoring for bleeding signs and symptoms, monitoring for clotting signs and symptoms, importance of notifying clinic for diarrhea, nausea/vomiting, reduced intake and/or illness and importance of notifying clinic of upcoming surgeries and procedures 2 weeks in advance    Helen verbalizes understanding and agrees to warfarin dosing plan.    Instructed to call the [facility] for any changes, questions or concerns. (#[phone])   ?   Esther Bonilla RN    Subjective/Objective:      Helen Beckham, a 89 y.o. female is on warfarin.     Helen reports:     Home warfarin dose: as updated on anticoagulation calendar per template     Missed doses: No     Medication changes:  No     S/S of bleeding or thromboembolism:  No     New  Injury or illness:  No     Changes in diet or alcohol consumption:  No     Upcoming surgery, procedure or cardioversion:  No    Anticoagulation Episode Summary     Current INR goal:   2.0-3.0   TTR:   76.7 % (1 y)   Next INR check:   5/18/2020   INR from last check:   3.00 (4/6/2020)   Weekly max warfarin dose:      Target end date:      INR check location:      Preferred lab:      Send INR reminders to:   [facility]    Indications    Chronic atrial fibrillation [I48.20]           Comments:            Anticoagulation Care Providers     Provider Role Specialty Phone number    Surya Delaney MD Referring Internal Medicine [phone]

## 2021-06-17 NOTE — PATIENT INSTRUCTIONS - HE
1.  Right greater trochanteric bursa injection today.    2.  See in three months or as needed.    3.  Same medications

## 2021-06-18 NOTE — PROGRESS NOTES
[facility] Progress Note    Assessment:  1.  Chronic atrial fibrillation with slower ventricular response which prompted decrease in atenolol and discontinuation of diltiazem.  Currently she is on metoprolol and digoxin.  Her only complaint is fatigue.  However she sounds as if she remains very active without specific difficulties with activities.  Her preference has been to remain on warfarin.  We talked about metoprolol potentially contributing to fatigue and whether we would try to transition her till diltiazem but I suggested that given her rate control and cardiac symptoms are stable I recommended that we continue with the current medicine regimen, her chads 2 vascular is 4.    2.  Hypertension.  Blood pressure appears to be under good control.  She has been monitoring her blood pressure and states that blood pressures have been good.    3.  History of hyperlipidemia with intolerance to statins.    4.  Heart failure with preserved ejection fraction.  She appears well compensated on today's examination with her current combination of medications.        Plan: As outlined above with no plan to change in medications.           She will follow-up with Dr. Jones in the next few months and with anticipated follow-up laboratory studies including digoxin level            Follow-up 6 months    1. Chronic atrial fibrillation (H)     2. Essential hypertension     3. Diastolic CHF, chronic (H)           An After Visit Summary was printed and given to the patient.    Subjective:    Helen Beckham is a 87 y.o. female who returned for a planned  follow up visit.  She reports that overall she has been feeling well.  She is very active and only reports feeling tired.  She wonders whether the medications are related to feeling tired.  However listening to her daily activities she appears very active.  She denies chest pain, shortness of breath, dizziness, palpitations, orthopnea or PND.  He has not had  significant lower extremity edema and reports no orthopnea.  She did have her medications previously decreased secondary to bradycardic tendency with the most recent Holter monitor reviewed August 2017.  She did have digoxin added to her regimen when she saw Dr. Jones and most recently saw Dr. Jones for every 2018.    Review of Systems:   General: WNL  Eyes: WNL  Ears/Nose/Throat: WNL  Lungs: WNL  Heart: WNL  Stomach: WNL  Bladder: WNL  Muscle/Joints: WNL  Skin: WNL  Nervous System: WNL  Mental Health: WNL     Blood: WNL      Problem List:    Patient Active Problem List   Diagnosis     Hyperlipidemia     Osteoporosis     Essential hypertension     Chronic atrial fibrillation (H)     Esophageal Reflux     Diverticulosis     Osteoarthritis     Diastolic CHF, chronic (H)       Social History     Social History     Marital status:      Spouse name: N/A     Number of children: 2     Years of education: N/A     Occupational History     Not on file.     Social History Main Topics     Smoking status: Never Smoker     Smokeless tobacco: Never Used     Alcohol use No     Drug use: Yes     Special: Nitrous oxide     Sexual activity: Not Currently     Partners: Male     Other Topics Concern     Not on file     Social History Narrative    She is .  She lives alone independently.  Her son Nigel is heavily involved in her care.  She has grandchildren and great-grandchildren.       Family History   Problem Relation Age of Onset     Heart disease Mother      No Medical Problems Father      COPD Sister      Kidney failure Brother      Liver disease Brother      No Medical Problems Son      No Medical Problems Daughter        Current Outpatient Prescriptions   Medication Sig Dispense Refill     clindamycin (CLEOCIN) 300 MG capsule Prior to dental appointments  0     COUMADIN 1 mg tablet Take 1 to 2 tablets (1 to 2 mg) by mouth daily. Adjust dose based on INR results as directed. 90 tablet 1     COUMADIN 2.5 mg tablet  "TAKE ONE-HALF TO ONE TABLET (1.25 MG TO 2.5 MG) BY MOUTH DAILY. ADJUST DOSE BASED ON INR RESULTS AS DIRECTED. 90 tablet 1     digoxin (LANOXIN) 125 mcg tablet Take 1 tablet (125 mcg total) by mouth daily. 30 tablet 11     furosemide (LASIX) 20 MG tablet TAKE ONE TABLET BY MOUTH TWICE DAILY  (Patient taking differently: TAKE ONE TABLET BY MOUTH DAILY) 90 tablet 3     metoprolol succinate (TOPROL-XL) 50 MG 24 hr tablet TAKE ONE TABLET BY MOUTH ONE TIME DAILY  90 tablet 1     pantoprazole (PROTONIX) 40 MG tablet TAKE ONE TABLET BY MOUTH ONE TIME DAILY  90 tablet 2     potassium chloride (KLOR-CON) 10 MEQ CR tablet Take 10 mEq by mouth daily.  3     mometasone (ELOCON) 0.1 % cream Apply to affected area twice daily until resoled 15 g 1     Current Facility-Administered Medications   Medication Dose Route Frequency Provider Last Rate Last Dose     denosumab 60 mg (PROLIA 60 mg/ml)  60 mg Subcutaneous Q6 Months Surya Delaney MD           Objective:     /76 (Patient Site: Right Arm, Patient Position: Sitting, Cuff Size: Adult Regular)  Pulse 84  Resp 16  Ht 5' 1.5\" (1.562 m)  Wt 131 lb (59.4 kg)  BMI 24.35 kg/m2  131 lb (59.4 kg)       Physical Exam:    GENERAL APPEARANCE: alert, no apparent distress  HEENT: no scleral icterus or xanthelasma  NECK: jugular venous pressure within normal limits  CHEST: symmetric, the lungs are clear to auscultation  CARDIOVASCULAR: Irregular rhythm, soft systolic murmur; no carotid bruits  Abdomen: No Organomegaly, masses, bruits, or tenderness. Bowels sounds are present      EXTREMITIES: no cyanosis, clubbing, suppport stockings in place, trace edema    Cardiac Testing:  Order# [id number]    Reading physician: Giuliana Duarte MD   Ordering physician: Aaron Mathew MD   Study date: 11/3/17         Patient Information      Patient Name MRN Sex              Age     Helen Beckham [id number] Female 1930 (87 y.o.)       Indications      Dx: Atrial fibrillation (H) " [I48.91 (ICD-10-CM)]       Summary      1. Normal left ventricular size and systolic performance with a visually estimated ejection fraction of 55-60%.   2. There is mild to moderate aortic insufficiency.   3. Normal right ventricular size and systolic performance.   4. There is moderate left atrial enlargement. There is mild to moderate right atrial enlargement.      When compared to the prior real-time echocardiogram dated on May 2017, the findings are fairly similar on both examinations.     SUMMARY:  1.  Subjectively and objectively negative regadenoson infusion per Dr. Duarte's  note.  2.  Regadenoson nuclear imaging does not suggest any ischemia or prior scar.  3.  Hyperdynamic wall motion as mentioned above with ejection fraction greater than  80%.  4.  No prior scan available for comparison.        MD christianne DELACRUZ 07/11/2014 14:59:56  T 07/11/2014 16:23:44  R 07/11/2014 16:23:44  INTERPRETATION DATE:  09/06/2017     TEST DATE:  08/30/2017       INTERPRETATION:  Predominant rhythm is atrial fibrillation, which is present  throughout the monitoring period.  Ventricular response ranged from 51 beats per  minute to 141 beats per minute, averaging 90 beats per minute over the monitoring  period.  Average resting ventricular response was 75 to 90 beats per minute.   Average ventricular response with activity was 90 to approximately 115 beats per  minute.  There were no significant pauses.  Right bundle-branch block aberrancy is  noted throughout the monitoring period.  Only 12 ventricular premature beats were  present.  No symptoms were reported in the diary.     CONCLUSION:  Persistent atrial fibrillation with controlled ventricular response.        JEAN MARIE pina  D 09/06/2017 10:52:19  T 09/06/2017 12:38:59  R 09/06/2017 12:38:59    Lab Results:    Lab Results   Component Value Date     02/12/2018    K 3.9 02/12/2018     02/12/2018    CO2 28 02/12/2018    BUN 16 02/12/2018     CREATININE 0.77 02/12/2018    CALCIUM 9.3 02/12/2018     Lab Results   Component Value Date    CHOL 208 (H) 02/12/2018    TRIG 158 (H) 02/12/2018    HDL 42 (L) 02/12/2018     BNP (pg/mL)   Date Value   02/06/2017 546 (H)   12/07/2015 235 (H)   10/12/2015 298 (H)     Creatinine (mg/dL)   Date Value   02/12/2018 0.77   07/25/2017 0.87   02/06/2017 0.99   01/23/2017 0.82     LDL Calculated (mg/dL)   Date Value   02/12/2018 134 (H)   01/23/2017 146 (H)   08/05/2016 163 (H)     Lab Results   Component Value Date    WBC 8.7 02/12/2018    WBC 7.2 04/20/2015    HGB 14.7 02/12/2018    HCT 44.8 02/12/2018    MCV 85 02/12/2018     02/12/2018               This note has been dictated using voice recognition software. Any grammatical or context distortions are unintentional and inherent to the software.      Aaron Mathew M.D., F.A.C.C.  [facility]  [phone]

## 2021-06-18 NOTE — PATIENT INSTRUCTIONS - HE
Patient Instructions by Rufino Taylor CMA at 2/11/2020  3:00 PM     Author: Rufino Taylor CMA Service: -- Author Type: Certified Medical Assistant    Filed: 2/11/2020  3:42 PM Encounter Date: 2/11/2020 Status: Addendum    : Surya Delaney MD (Physician)    Related Notes: Original Note by Rufino Taylor CMA (Certified Medical Assistant) filed at 2/11/2020  3:08 PM         Patient Education     Exercise for a Healthier Heart  You may wonder how you can improve the health of your heart. If youre thinking about exercise, youre on the right track. You dont need to become an athlete, but you do need a certain amount of brisk exercise to help strengthen your heart. If you have been diagnosed with a heart condition, your doctor may recommend exercise to help stabilize your condition. To help make exercise a habit, choose safe, fun activities.       Be sure to check with your health care provider before starting an exercise program.    Why exercise?  Exercising regularly offers many healthy rewards. It can help you do all of the following:    Improve your blood cholesterol levels to help prevent further heart trouble    Lower your blood pressure to help prevent a stroke or heart attack    Control diabetes, or reduce your risk of getting this disease    Improve your heart and lung function    Reach and maintain a healthy weight    Make your muscles stronger and more limber so you can stay active    Prevent falls and fractures by slowing the loss of bone mass (osteoporosis)    Manage stress better  Exercise tips  Ease into your routine. Set small goals. Then build on them.  Exercise on most days. Aim for a total of 150 or more minutes of moderate to  vigorous intensity activity each week. Consider 40 minutes, 3 to 4 times a week. For best results, activity should last for 40 minutes on average. It is OK to work up to the 40 minute period over time. Examples of moderate-intensity activity is walking one mile in 15  minutes or 30 to 45 minutes of yard work.  Step up your daily activity level. Along with your exercise program, try being more active throughout the day. Walk instead of drive. Do more household tasks or yard work.  Choose one or more activities you enjoy. Walking is one of the easiest things you can do. You can also try swimming, riding a bike, or taking an exercise class.  Stop exercising and call your doctor if you:    Have chest pain or feel dizzy or lightheaded    Feel burning, tightness, pressure, or heaviness in your chest, neck, shoulders, back, or arms    Have unusual shortness of breath    Have increased joint or muscle pain    Have palpitations or an irregular heartbeat      3488-4466 The [employer]. [address] [place] [address]. All rights reserved. This information is not intended as a substitute for professional medical care. Always follow your healthcare professional's instructions.         Patient Education   Understanding [employer] MyPlate  The USDA (US Department of Agriculture) has guidelines to help you make healthy food choices. These are called MyPlate. MyPlate shows the food groups that make up healthy meals using the image of a place setting. Before you eat, think about the healthiest choices for what to put onto your plate or into your cup or bowl. To learn more about building a healthy plate, visit www.[employer] Securitymyplate.gov.       The Food Groups    Fruits: Any fruit or 100% fruit juice counts as part of the Fruit Group. Fruits may be fresh, canned, frozen, or dried, and may be whole, cut-up, or pureed. Make half your plate fruits and vegetables.    Vegetables: Any vegetable or 100% vegetable juice counts as a member of the Vegetable Group. Vegetables may be fresh, frozen, canned, or dried. They can be served raw or cooked and may be whole, cut-up, or mashed. Make half your plate fruits and vegetables.     Grains: All foods made from grains are part of the Grains Group. These  include wheat, rice, oats, cornmeal, and barley such as bread, pasta, oatmeal, cereal, tortillas, and grits. Grains should be no more than a quarter of your plate. At least half of your grains should be whole grains.    Protein: This group includes meat, poultry, seafood, beans and peas, eggs, processed soy products (like tofu), nuts (including nut butters), and seeds. Make protein choices no more than a quarter of your plate. Meat and poultry choices should be lean or low fat.    Dairy: All fluid milk products and foods made from milk that contain calcium, like yogurt and cheese are part of the Dairy Group. (Foods that have little calcium, such as cream, butter, and cream cheese, are not part of the group.) Most dairy choices should be low-fat or fat-free.    Oils: These are fats that are liquid at room temperature. They include canola, corn, olive, soybean, and sunflower oil. Foods that are mainly oil include mayonnaise, certain salad dressings, and soft margarines. You should have only 5 to 7 teaspoons of oils a day. You probably already get this much from the food you eat.  Use Tas[employer]e to Help Build Your Meals  The SuperTracker can help you plan and track your meals and activity. You can look up individual foods to see or compare their nutritional value. You can get guidelines for what and how much you should eat. You can compare your food choices. And you can assess personal physical activities and see ways you can improve. Go to www.[employer] Energyplate.gov/supertracker/.    9171-3733 [employer]. [address] [place] [address]. All rights reserved. This information is not intended as a substitute for professional medical care. Always follow your healthcare professional's instructions.           Patient Education   Preventing Falls in the Home  As you get older, falls are more likely. Thats because your reaction time slows. Your muscles and joints may also get stiffer, making them less  flexible. Illness, medications, and vision changes can also affect your balance. A fall could leave you unable to live on your own. To make your home safer, follow these tips:    Floors    Put nonskid pads under area rugs.    Remove throw rugs.    Replace worn floor coverings.    Tack carpets firmly to each step on carpeted stairs. Put nonskid strips on the edges of uncarpeted stairs.    Keep floors and stairs free of clutter and cords.    Arrange furniture so there are clear pathways.    Clean up any spills right away.    Bathrooms    Install grab bars in the tub or shower.    Apply nonskid strips or put a nonskid rubber mat in the tub or shower.    Sit on a bath chair to bathe.    Use bathmats with nonskid backing.    Lighting    Keep a flashlight in each room.    Put a nightlight along the pathway between the bedroom and the bathroom.    5611-3033 The [employer]. [address] [place] [address]. All rights reserved. This information is not intended as a substitute for professional medical care. Always follow your healthcare professional's instructions.         Patient Education   Understanding Advance Care Planning  Advance care planning is the process of deciding ones own future medical care. It helps ensure that if you cant speak for yourself, your wishes can still be carried out. The plan is a series of legal documents that note a persons wishes. The documents vary by state. Advance care planning may be done when a person has a serious illness that is expected to get worse. It may be done before major surgery. And it can help you and your family be prepared in case of a major illness or injury. Advance care planning helps with making decisions at these times.       A health care proxy is a person who acts as the voice of a patient when the patient cant speak for himself or herself. The name of this role varies by state. It may be called a Durable Medical Power of  or Durable Power  of  for Healthcare. It may be called an agent, surrogate, or advocate. Or it may be called a representative or decision maker. It is an official duty that is identified by a legal document. The document also varies by state.    Why Is Advance Care Planning Important?  If a person communicates their healthcare wishes:    They will be given medical care that matches their values and goals.    Their family members will not be forced to make decisions in a crisis with no guidance.  Creating a Plan  Making an advance care plan is often done in 3 steps:    Thinking about ones wishes. To create an advance care plan, you should think about what kind of medical treatment you would want if you lose the ability to communicate. Are there any situations in which you would refuse or stop treatment? Are there therapies you would want or not want? And whom do you want to make decisions for you? There are many places to learn more about how to plan for your care. Ask your doctor or  for resources.    Picking a health care proxy. This means choosing a trusted person to speak for you only when you cant speak for yourself. When you cannot make medical decisions, your proxy makes sure the instructions in your advance care plan are followed. A proxy does not make decisions based on his or her own opinions. They must put aside those opinions and values if needed, and carry out your wishes.    Filling out the legal documents. There are several kinds of legal documents for advance care planning. Each one tells health care providers your wishes. The documents may vary by state. They must be signed and may need to be witnessed or notarized. You can cancel or change them whenever you wish. Depending on your state, the documents may include a Healthcare Proxy form, Living Will, Durable Medical Power of , Advance Directive, or others.  The Familys Role  The best help a family can give is to support their loved ones  wishes. Open and honest communication is vital. Family should express any concerns they have about the patients choices while the patient can still make decisions.    6453-5302 The [employer]. [address] [place] [address], [place] [address]. All rights reserved. This information is not intended as a substitute for professional medical care. Always follow your healthcare professional's instructions.         Also, [facility] offers a free, downloadable health care directive that allows you to share your treatment choices and personal preferences if you cannot communicate your wishes. It also allows you to appoint another person (called a health care agent) to make health care decisions if you are unable to do so. You can download an advance directive by going here: [email or web address]     Patient Education   Personalized Prevention Plan  You are due for the preventive services outlined below.  Your care team is available to assist you in scheduling these services.  If you have already completed any of these items, please share that information with your care team to update in your medical record.  Health Maintenance   Topic Date Due   ? HEART FAILURE ACTION PLAN  12/03/1930   ? MEDICARE ANNUAL WELLNESS VISIT  12/03/1995   ? ADVANCE CARE PLANNING  05/13/2019   ? BMP  02/13/2020   ? CBC  02/19/2020   ? ALT  08/13/2020   ? LIPID  08/13/2020   ? FALL RISK ASSESSMENT  08/13/2020   ? DXA SCAN  11/14/2021   ? TD 18+ HE  04/20/2025   ? TSH  Completed   ? PNEUMOCOCCAL IMMUNIZATION 65+ LOW/MEDIUM RISK  Completed   ? INFLUENZA VACCINE RULE BASED  Completed   ? ZOSTER VACCINES  Completed      1.  Could try Tumeric for knee pain.  Also use tylenol.    2.  Either Reclast or Prolia at  End of the month    3.  I will notify you of test results.    4.  See in six months or as needed

## 2021-06-18 NOTE — PROGRESS NOTES
[place] study updates/corrections      Subject experienced no adverse events during her participation in the [place] study.    Maris Tracy

## 2021-06-18 NOTE — LETTER
Letter by Surya Delaney MD at      Author: Surya Delaney MD Service: -- Author Type: --    Filed:  Encounter Date: 3/13/2019 Status: (Other)       Helen Beckham  [address] [place] [address]             March 13, 2019         Dear Ms. Beckham,    Below are the results from your recent visit:    Resulted Orders   Basic Metabolic Panel   Result Value Ref Range    Sodium 139 136 - 145 mmol/L    Potassium 4.4 3.5 - 5.0 mmol/L    Chloride 104 98 - 107 mmol/L    CO2 28 22 - 31 mmol/L    Anion Gap, Calculation 7 5 - 18 mmol/L    Glucose 89 70 - 125 mg/dL    Calcium 9.6 8.5 - 10.5 mg/dL    BUN 25 8 - 28 mg/dL    Creatinine 0.97 0.60 - 1.10 mg/dL    GFR MDRD Af Amer >60 >60 mL/min/1.73m2    GFR MDRD Non Af Amer 54 (L) >60 mL/min/1.73m2    Narrative    Fasting Glucose reference range is 70-99 mg/dL per  American Diabetes Association (ADA) guidelines.       Helen: Potassium, kidney tests, and other labs all are good.    Please call with questions or contact us using [employer].    Sincerely,        Electronically signed by Surya Delaney MD

## 2021-06-18 NOTE — LETTER
Letter by Carson Angeles MD at      Author: Carson Angeles MD Service: -- Author Type: --    Filed:  Encounter Date: 2/20/2019 Status: (Other)       Helen Beckham  [address] [place] [address]             February 20, 2019         Dear Ms. Beckham,    Below are the results from your recent visit:    Resulted Orders   BNP(B-type Natriuretic Peptide)   Result Value Ref Range     (H) 0 - 167 pg/mL   HM2(CBC w/o Differential)   Result Value Ref Range    WBC 9.6 4.0 - 11.0 thou/uL    RBC 4.81 3.80 - 5.40 mill/uL    Hemoglobin 13.6 12.0 - 16.0 g/dL    Hematocrit 42.2 35.0 - 47.0 %    MCV 88 80 - 100 fL    MCH 28.3 27.0 - 34.0 pg    MCHC 32.3 32.0 - 36.0 g/dL    RDW 12.9 11.0 - 14.5 %    Platelets 185 140 - 440 thou/uL    MPV 8.8 7.0 - 10.0 fL   Basic Metabolic Panel   Result Value Ref Range    Sodium 141 136 - 145 mmol/L    Potassium 4.1 3.5 - 5.0 mmol/L    Chloride 105 98 - 107 mmol/L    CO2 25 22 - 31 mmol/L    Anion Gap, Calculation 11 5 - 18 mmol/L    Glucose 96 70 - 125 mg/dL    Calcium 9.8 8.5 - 10.5 mg/dL    BUN 26 8 - 28 mg/dL    Creatinine 1.05 0.60 - 1.10 mg/dL    GFR MDRD Af Amer 60 (L) >60 mL/min/1.73m2    GFR MDRD Non Af Amer 49 (L) >60 mL/min/1.73m2    Narrative    Fasting Glucose reference range is 70-99 mg/dL per  American Diabetes Association (ADA) guidelines.       Dr. Angeles for Dr. Jones    Your blood tests of heart failure and kidney, are normal and stable for you    Please call with questions or contact us using [employer].    Sincerely,        Electronically signed by Carson Angeles MD
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
## 2021-06-19 NOTE — LETTER
Letter by Surya Delaney MD at      Author: Surya Delaney MD Service: -- Author Type: --    Filed:  Encounter Date: 10/11/2019 Status: Signed         Helenhi Beckham  [address] [place] [address]             October 11, 2019         Dear Ms. Beckham,    Below are the results from your recent visit:    Resulted Orders   XR Pelvis W 2 Vw Hip Right    Narrative    EXAM: XR PELVIS W 2 VW HIP RIGHT  LOCATION: [facility]  DATE/TIME: 10/11/2019 1:59 PM    INDICATION: Pain in right hip  COMPARISON: None.      Impression    Moderate degenerative osteoarthritis. No fracture or dislocation.       Helen: Hip x-ray reveals moderate degenerative arthritis.    Please call with questions or contact us using [employer].    Sincerely,        Electronically signed by Surya Delaney MD

## 2021-06-19 NOTE — LETTER
Letter by Surya Delaney MD at      Author: Surya Delaney MD Service: -- Author Type: --    Filed:  Encounter Date: 8/20/2019 Status: (Other)         Helen Beckham  [address] [place] [address]             August 20, 2019         Dear Ms. Beckham,    Below are the results from your recent visit:    Resulted Orders   Urinalysis-UC if Indicated   Result Value Ref Range    Color, UA Yellow Colorless, Yellow, Straw, Light Yellow    Clarity, UA Cloudy (!) Clear    Glucose, UA Negative Negative    Bilirubin, UA Negative Negative    Ketones, UA Negative Negative    Specific Gravity, UA 1.020 1.005 - 1.030    Blood, UA Moderate (!) Negative    pH, UA 6.5 5.0 - 8.0    Protein, UA 30 mg/dL (!) Negative mg/dL    Urobilinogen, UA 1.0 E.U./dL 0.2 E.U./dL, 1.0 E.U./dL    Nitrite, UA Positive (!) Negative    Leukocytes, UA Large (!) Negative    Bacteria, UA Moderate (!) None Seen hpf    RBC, UA 5-10 (!) None Seen, 0-2 hpf    WBC, UA 10-25 (!) None Seen, 0-5 hpf    Squam Epithel, UA 0-5 None Seen, 0-5 lpf    Narrative    Urine Culture ordered based on [facility] criteria   Lipid Cascade   Result Value Ref Range    Cholesterol 190 <=199 mg/dL    Triglycerides 80 <=149 mg/dL    HDL Cholesterol 54 >=50 mg/dL    LDL Calculated 120 <=129 mg/dL    Patient Fasting > 8hrs? Yes    Comprehensive Metabolic Panel   Result Value Ref Range    Sodium 141 136 - 145 mmol/L    Potassium 3.7 3.5 - 5.0 mmol/L    Chloride 107 98 - 107 mmol/L    CO2 27 22 - 31 mmol/L    Anion Gap, Calculation 7 5 - 18 mmol/L    Glucose 95 70 - 125 mg/dL    BUN 21 8 - 28 mg/dL    Creatinine 0.85 0.60 - 1.10 mg/dL    GFR MDRD Af Amer >60 >60 mL/min/1.73m2    GFR MDRD Non Af Amer >60 >60 mL/min/1.73m2    Bilirubin, Total 0.6 0.0 - 1.0 mg/dL    Calcium 9.2 8.5 - 10.5 mg/dL    Protein, Total 6.4 6.0 - 8.0 g/dL    Albumin 4.1 3.5 - 5.0 g/dL    Alkaline Phosphatase 63 45 - 120 U/L    AST 19 0 - 40 U/L    ALT 12 0 - 45 U/L    Narrative     Fasting Glucose reference range is 70-99 mg/dL per  American Diabetes Association (ADA) guidelines.   Culture, Urine   Result Value Ref Range    Culture >100,000 col/ml Escherichia coli (!)        Helen: The urine culture returned positive for E. Coli.  Given the malodorous urine, I would advise a short course of antibiotics.  I will send a prescription for Bactrim DS 1 twice daily for 3 days to your pharmacy.  Lipids are fair with a total cholesterol 190 and an LDL of 120.   Other labs including your potassium, blood sugar, liver and kidney tests are normal.    Please call with questions or contact us using [employer].    Sincerely,        Electronically signed by Surya Delaney MD

## 2021-06-19 NOTE — PROGRESS NOTES
The following steps were completed to comply with the REMS program for Prolia:  1. Ordering provider has previously reviewed information in the Medication Guide and Patient Counseling Chart, including the serious risks of Prolia  and the symptoms of each risk and have been advised  to seek prompt medical attention if they have signs or symptoms of any of the serious risks.  2. Provided each patient a copy of the Medication Guide and Patient Brochure.  See MAR for administration details.   Indication: Prolia  (denosumab) is a prescription medicine used to treat osteoporosis in patients who:   Are at high risk for fracture, meaning patients who have had a fracture related to osteoporosis, or who have multiple risk factors for fracture; Cannot use another osteoporosis medicine or other osteoporosis medicines did not work well.   The timeline for early/late injections would be 4 weeks early and any time after the 6 month kaitlin. If a patient receives their injection late, then the subsequent injection would be 6 months from the date that they actually received the injection    1.  When was the last injection?  2/12/18  2.  Has insurance for this injection been verified?  yes    Did you experience any new onset achiness or rashes that lasted for over a month with your previous Prolia injection?   No    Do you have a fever over 101?F or a new deep cough that is unusual for you today? No    Have you started any new medications in the last 6 months that you were told could affect your immune system? These may have been prescribed by oncologist, transplant, rheumatology, or dermatology.   No    In the last 6 months have you have gastric bypass or parathyroid surgery?   No    Do you plan dental work requiring drilling into the bone such as implants/extractions or oral surgery in the next 2-3 months?   No

## 2021-06-19 NOTE — LETTER
Letter by Surya Delaney MD at      Author: Surya Delaney MD Service: -- Author Type: --    Filed:  Encounter Date: 11/25/2019 Status: Signed         Helen Beckham  [address] [place] [address]             November 25, 2019         Dear Ms. Beckham,    Below are the results from your recent visit:    Resulted Orders   DXA Bone Density Scan    Narrative    11/14/2019      RE: Hleen Beckham  YOB: 1930        Dear Surya Delaney,    Patient Profile:  88 y.o. female, postmenopausal, is here for the follow up bone density   test.   History of fractures - None. Family history of osteoporosis - None.    Family history of hip fracture: None. Smoking history - No. Osteoporosis   treatment past -  No. Osteoporosis treatment current - Yes;  Prolia.    Chronic medical problems - Height loss. High risk medications -  Blood   thinner (Coumadin or Heparin);  Yes, Currently.    Assessment:    1. The spine bone density is best assessed with L3 excluded.   Using L1,   L2, and L4, measured bone density is in the normal range with a T score of   -0.3  2. Femoral bone densities show left total hip T- score of -1.7, and right   total hip T-score of -1.8..  3.  Bone density also was checked in the wrist as an alternate site since   the spine measurement is of suboptimal reliability due to multilevel   degenerative change.  In the left 33% radius, osteoporosis is seen with T   score of -3.7.  4.  The trabecular bone score could not be assessed.  5.  Since the scan in 2017, bone density has increased a significant 4.6%   in the left total hip, and 3.2% in the right total hip.    88 y.o. female with OSTEOPOROSIS and HIGH predicted future fracture risk.    BMD changes reflect a good response to current management.      Recommendations:  Continuation of Prolia is advised with a recheck in 2 years.      Bone densitometry was performed on your patient using our ResQU   densitometer. The results are  summarized and a copy of the actual scans   are included for your review. In conformity with the International Society   of Clinical Densitometry's most recent position statement for DXA   interpretation (2015), the diagnosis will be made on the lowest measured   T-score of the lumbar spine, femoral neck, total proximal femur or 33%   radius. Note the change in terminology for diagnostic classification from   OSTEOPENIA to LOW BONE MASS. All trending for sequential exams will be   done using multiple vertebrae or the total proximal femur. Fracture risk   is based on the WHO Fracture Risk Assessment Tool (FRAX). If additional   information is needed or if you would like to discuss the results, please   do not hesitate to call me.       Thank you for referring this patient to [facility].   We are happy to be of service in support of you and your practice. If you   have any questions or suggestions to improve our service, please call me   at [phone].     Sincerely,     Surya Delaney M.D. C.C.PHYLICIA.  Osteoporosis Services, [facility]         Helen: The bone density test reflects a good response to current treatment.  I would advise continuing Prolia with a recheck of bone density in 2 years.    Please call with questions or contact us using [employer].    Sincerely,        Electronically signed by Surya Delaney MD

## 2021-06-20 NOTE — LETTER
Letter by Surya Delaney MD at      Author: Surya Delaney MD Service: -- Author Type: --    Filed:  Encounter Date: 2/14/2020 Status: (Other)         Helen Beckham  [address] [place] [address]             February 14, 2020         Dear Ms. Beckham,    Below are the results from your recent visit:    Resulted Orders   Vitamin D, Total (25-Hydroxy)   Result Value Ref Range    Vitamin D, Total (25-Hydroxy) 55.6 30.0 - 80.0 ng/mL    Narrative    Deficiency <10.0 ng/mL  Insufficiency 10.0-29.9 ng/mL  Sufficiency 30.0-80.0 ng/mL  Toxicity (possible) >100.0 ng/mL   Lipid Cascade   Result Value Ref Range    Cholesterol 226 (H) <=199 mg/dL    Triglycerides 100 <=149 mg/dL    HDL Cholesterol 56 >=50 mg/dL    LDL Calculated 150 (H) <=129 mg/dL    Patient Fasting > 8hrs? Unknown    Comprehensive Metabolic Panel   Result Value Ref Range    Sodium 139 136 - 145 mmol/L    Potassium 4.2 3.5 - 5.0 mmol/L    Chloride 102 98 - 107 mmol/L    CO2 27 22 - 31 mmol/L    Anion Gap, Calculation 10 5 - 18 mmol/L    Glucose 82 70 - 125 mg/dL    BUN 19 8 - 28 mg/dL    Creatinine 0.95 0.60 - 1.10 mg/dL    GFR MDRD Af Amer >60 >60 mL/min/1.73m2    GFR MDRD Non Af Amer 55 (L) >60 mL/min/1.73m2    Bilirubin, Total 0.5 0.0 - 1.0 mg/dL    Calcium 9.4 8.5 - 10.5 mg/dL    Protein, Total 7.0 6.0 - 8.0 g/dL    Albumin 4.4 3.5 - 5.0 g/dL    Alkaline Phosphatase 62 45 - 120 U/L    AST 20 0 - 40 U/L    ALT 12 0 - 45 U/L    Narrative    Fasting Glucose reference range is 70-99 mg/dL per  American Diabetes Association (ADA) guidelines.   HM2(CBC w/o Differential)   Result Value Ref Range    WBC 8.2 4.0 - 11.0 thou/uL    RBC 4.81 3.80 - 5.40 mill/uL    Hemoglobin 14.3 12.0 - 16.0 g/dL    Hematocrit 44.0 35.0 - 47.0 %    MCV 91 80 - 100 fL    MCH 29.8 27.0 - 34.0 pg    MCHC 32.6 32.0 - 36.0 g/dL    RDW 11.8 11.0 - 14.5 %    Platelets 248 140 - 440 thou/uL    MPV 7.8 7.0 - 10.0 fL   Urinalysis-UC if Indicated   Result Value Ref Range    Color, UA  Yellow Colorless, Yellow, Straw, Light Yellow    Clarity, UA Clear Clear    Glucose, UA Negative Negative    Bilirubin, UA Negative Negative    Ketones, UA Negative Negative    Specific Gravity, UA 1.020 1.005 - 1.030    Blood, UA Trace (!) Negative    pH, UA 5.5 5.0 - 8.0    Protein, UA Negative Negative mg/dL    Urobilinogen, UA 0.2 E.U./dL 0.2 E.U./dL, 1.0 E.U./dL    Nitrite, UA Negative Negative    Leukocytes, UA Negative Negative    Bacteria, UA None Seen None Seen hpf    RBC, UA None Seen None Seen, 0-2 hpf    WBC, UA None Seen None Seen, 0-5 hpf    Squam Epithel, UA None Seen None Seen, 0-5 lpf    Narrative    UC not indicated       Helen: Vitamin D level is in the desired range at 55.6.  Continue your current supplement.  The total cholesterol is elevated 226 with an LDL fraction of 150.  Dietary management is appropriate.  Other labs including your potassium, blood sugar, hemoglobin, liver and kidney tests are normal.  It was nice to see you.    Please call with questions or contact us using [employer].    Sincerely,        Electronically signed by Surya Delaney MD

## 2021-06-20 NOTE — LETTER
Letter by Surya Delaney MD at      Author: Surya Delaney MD Service: -- Author Type: --    Filed:  Encounter Date: 9/15/2020 Status: (Other)         Helen Beckham  [address] [place] [address]             September 15, 2020         Dear Ms. Beckham,    Below are the results from your recent visit:    Resulted Orders   Basic Metabolic Panel   Result Value Ref Range    Sodium 140 136 - 145 mmol/L    Potassium 4.0 3.5 - 5.0 mmol/L    Chloride 104 98 - 107 mmol/L    CO2 25 22 - 31 mmol/L    Anion Gap, Calculation 11 5 - 18 mmol/L    Glucose 93 70 - 125 mg/dL    Calcium 9.6 8.5 - 10.5 mg/dL    BUN 17 8 - 28 mg/dL    Creatinine 0.94 0.60 - 1.10 mg/dL    GFR MDRD Af Amer >60 >60 mL/min/1.73m2    GFR MDRD Non Af Amer 56 (L) >60 mL/min/1.73m2    Narrative    Fasting Glucose reference range is 70-99 mg/dL per  American Diabetes Association (ADA) guidelines.       Helen: Your potassium, kidney tests, and other labs are good.  Continue current medications.  It was nice to see you.    Please call with questions or contact us using [address].    Sincerely,        Electronically signed by Surya Delaney MD

## 2021-06-20 NOTE — LETTER
Letter by Surya Delaney MD at      Author: Surya Delaney MD Service: -- Author Type: --    Filed:  Encounter Date: 5/19/2020 Status: (Other)         Helen Beckham  [address] [place] [address]             May 19, 2020         Dear Ms. Beckham,    Below are the results from your recent visit:    Resulted Orders   BNP(B-type Natriuretic Peptide)   Result Value Ref Range     (H) 0 - 167 pg/mL   Basic Metabolic Panel   Result Value Ref Range    Sodium 141 136 - 145 mmol/L    Potassium 4.3 3.5 - 5.0 mmol/L    Chloride 102 98 - 107 mmol/L    CO2 28 22 - 31 mmol/L    Anion Gap, Calculation 11 5 - 18 mmol/L    Glucose 83 70 - 125 mg/dL    Calcium 9.8 8.5 - 10.5 mg/dL    BUN 17 8 - 28 mg/dL    Creatinine 0.95 0.60 - 1.10 mg/dL    GFR MDRD Af Amer >60 >60 mL/min/1.73m2    GFR MDRD Non Af Amer 55 (L) >60 mL/min/1.73m2    Narrative    Fasting Glucose reference range is 70-99 mg/dL per  American Diabetes Association (ADA) guidelines.   HM2(CBC w/o Differential)   Result Value Ref Range    WBC 8.5 4.0 - 11.0 thou/uL    RBC 4.43 3.80 - 5.40 mill/uL    Hemoglobin 13.4 12.0 - 16.0 g/dL    Hematocrit 39.8 35.0 - 47.0 %    MCV 90 80 - 100 fL    MCH 30.3 27.0 - 34.0 pg    MCHC 33.7 32.0 - 36.0 g/dL    RDW 13.0 11.0 - 14.5 %    Platelets 264 140 - 440 thou/uL    MPV 7.7 7.0 - 10.0 fL       Helen: The BNP,(heart test), is moderately elevated to 251.  This is actually lower than your value of 580 in February 2019.  Other labs including the potassium, hemoglobin, and kidney tests are normal.  Let me know the effect of the higher furosemide dose and your edema.  I hope the injection is of benefit for hip pain.    Please call with questions or contact us using [employer].    Sincerely,        Electronically signed by Surya Delaney MD

## 2021-06-20 NOTE — LETTER
Letter by Surya Delaney MD at      Author: Surya Delaney MD Service: -- Author Type: --    Filed:  Encounter Date: 6/28/2020 Status: (Other)         Helen Beckham  [address] [place] [address]             June 28, 2020         Dear Ms. Beckham,    Below are the results from your recent visit:    Resulted Orders   Basic Metabolic Panel   Result Value Ref Range    Sodium 139 136 - 145 mmol/L    Potassium 4.2 3.5 - 5.0 mmol/L    Chloride 104 98 - 107 mmol/L    CO2 23 22 - 31 mmol/L    Anion Gap, Calculation 12 5 - 18 mmol/L    Glucose 78 70 - 125 mg/dL    Calcium 9.2 8.5 - 10.5 mg/dL    BUN 13 8 - 28 mg/dL    Creatinine 0.87 0.60 - 1.10 mg/dL    GFR MDRD Af Amer >60 >60 mL/min/1.73m2    GFR MDRD Non Af Amer >60 >60 mL/min/1.73m2    Narrative    Fasting Glucose reference range is 70-99 mg/dL per  American Diabetes Association (ADA) guidelines.       Helen: Your potassium, blood sugar, kidney tests and other labs are normal.  Continue current medications.  I hope you have a nice summer.    Please call with questions or contact us using [employer].    Sincerely,        Electronically signed by Surya Delaney MD

## 2021-06-21 NOTE — LETTER
Letter by Surya Delaney MD at      Author: Surya Delaney MD Service: -- Author Type: --    Filed:  Encounter Date: 12/10/2020 Status: (Other)         Helen Beckham  [address] [place] [address]             December 10, 2020         Dear Ms. Beckham,    Below are the results from your recent visit:    Resulted Orders   Basic Metabolic Panel   Result Value Ref Range    Sodium 139 136 - 145 mmol/L    Potassium 4.0 3.5 - 5.0 mmol/L    Chloride 104 98 - 107 mmol/L    CO2 24 22 - 31 mmol/L    Anion Gap, Calculation 11 5 - 18 mmol/L    Glucose 99 70 - 125 mg/dL    Calcium 9.0 8.5 - 10.5 mg/dL    BUN 19 8 - 28 mg/dL    Creatinine 0.93 0.60 - 1.10 mg/dL    GFR MDRD Af Amer >60 >60 mL/min/1.73m2    GFR MDRD Non Af Amer 57 (L) >60 mL/min/1.73m2    Narrative    Fasting Glucose reference range is 70-99 mg/dL per  American Diabetes Association (ADA) guidelines.       Helen: Your potassium, kidney tests, and other labs are good.  Continue current medications.    Please call with questions or contact us using [employer].    Sincerely,        Electronically signed by Surya Delaney MD

## 2021-06-21 NOTE — LETTER
Letter by Surya Delaney MD at      Author: Surya Delaney MD Service: -- Author Type: --    Filed:  Encounter Date: 4/21/2021 Status: (Other)         Helen Beckham  [address] [place] [address]             April 21, 2021         Dear Ms. Beckham,    Below are the results from your recent visit:    Resulted Orders   Basic Metabolic Panel   Result Value Ref Range    Sodium 140 136 - 145 mmol/L    Potassium 4.2 3.5 - 5.0 mmol/L    Chloride 103 98 - 107 mmol/L    CO2 28 22 - 31 mmol/L    Anion Gap, Calculation 9 5 - 18 mmol/L    Glucose 81 70 - 125 mg/dL    Calcium 9.5 8.5 - 10.5 mg/dL    BUN 20 8 - 28 mg/dL    Creatinine 0.99 0.60 - 1.10 mg/dL    GFR MDRD Af Amer >60 >60 mL/min/1.73m2    GFR MDRD Non Af Amer 53 (L) >60 mL/min/1.73m2    Narrative    Fasting Glucose reference range is 70-99 mg/dL per  American Diabetes Association (ADA) guidelines.       Helen: Your potassium, kidney tests and other labs look good.  It was nice to see you.    Please call with questions or contact us using [employer].    Sincerely,        Electronically signed by Surya Delaney MD

## 2021-06-22 NOTE — PROGRESS NOTES
ASSESSMENT:  1.  Low back pain: Discomfort is nonradicular.  There was no trauma or obvious provoking factors.  Conservative management is appropriate    2.  Osteoporosis: On Prolia.  There was no trauma.  A fragility fracture of the spine seems unlikely by exam and history    3.  Atrial fibrillation: Rate control is good with digoxin and metoprolol.  She remains on warfarin as an coagulant  PLAN:  1.  Back care and exercises were reviewed    2.  Tizanidine 2 mg 4 times daily as needed for muscle tightness.  She was cautioned that this may cause fatigue.  He should not drive while he notes    3.  Tramadol 50 mg.  1/2-1 4 times daily as needed for pain.  Take one Tylenol with each tramadol.    4.  Call if not improving in 1 week.  An imaging study then would be advised.  Formal physical therapy would be recommended    There are no discontinued medications.        ASSESSED PROBLEMS:  1. Acute midline low back pain without sciatica  tiZANidine (ZANAFLEX) 2 MG tablet    traMADol (ULTRAM) 50 mg tablet       CHIEF COMPLAINT:  Chief Complaint   Patient presents with     Back Pain     X five days of on going lower backpain, pain all the time even sleeping sitting or walking, it just hurt       HISTORY OF PRESENT ILLNESS:  Helen is a 87 y.o. female who presents as an add-on complaining of low back pain.  Symptoms began about 4 days ago with no obvious provoking factors.  She does have a history of osteoporosis, there is been no fall or back trauma.  She notes pain at rest which is aggravated by change in position.  Discomfort extends to the right flank and hip area but does not radiate to the lower leg.  There is no weakness or numbness in the lower leg.    She otherwise has done fairly well since last visit.  She has a history of atrial fibrillation.  She is on digoxin and metoprolol for rate control with warfarin as an anticoagulant.  She is on furosemide due to a diagnosis of diastolic cardiac dysfunction.  She has not  had any major issues with peripheral edema.  She is on Prolia for osteoporosis    REVIEW OF SYSTEMS:    Comprehensive review of systems is negative except as mentioned    PFSH:  .  [place] season [place] to [place] [employer] [place].  Still drives and is quite active    TOBACCO USE:  Social History     Tobacco Use   Smoking Status Never Smoker   Smokeless Tobacco Never Used       VITALS:  Vitals:    11/27/18 1533   BP: 136/74   Patient Site: Left Arm   Patient Position: Sitting   Cuff Size: Adult Regular   Pulse: 60   SpO2: 98%   Weight: 128 lb (58.1 kg)     Wt Readings from Last 3 Encounters:   11/27/18 128 lb (58.1 kg)   08/13/18 129 lb (58.5 kg)   06/18/18 131 lb (59.4 kg)       PHYSICAL EXAM:  Constitutional:   Reveals an alert pleasant elderly woman who ambulates with a cane.  She appears somewhat uncomfortable with change in position vitals: per nursing notes.  HEENT: Atraumatic  Eyes:  EOMs full, PERRL.  Oropharynx:   Mouth and throat clear, no thrush or exudate.  Neck:  Supple, no carotid bruits or adenopathy.  Back:  No spine or CVA pain.  She notes discomfort in the lower lumbar area with change in position.  There is no point tenderness over the spine or SI joints  Thorax:  No bony deformities.  Lungs: Clear to A&P without rales or wheezes.  Respiratory effort normal.  Cardiac:   Irregularly irregular rhythm  Abdomen:  Soft, active bowel sounds without bruits, mass, or tenderness..  Extremities:   No peripheral edema, pulses in the feet intact.  Changes suggest osteoarthritis.  Negative straight leg raise bilaterally.  Internal and external rotation of the hips appear full  Skin:  No jaundice, peripheral cyanosis or lesions to suggest malignancy.  Neuro:  Alert and oriented. Cranial nerves, motor, sensory exams are intact.  No gross focal deficits.  Deep tendon reflexes symmetric in the knees  Psychiatric:  Memory intact, mood appropriate.    DATA REVIEWED:  Additional History from Old Records Summarized  (2): None.  Decision to Obtain Records (1): None.   Radiology Tests Summarized or Ordered (1): None.  Labs Reviewed or Ordered (1): None.  Medicine Test Summarized or Ordered (1): None.   Independent Review of EKG or X-RAY(2 each): None.    The visit lasted a total of 25 minutes face to face with the patient. Over 50% of the time was spent counseling and educating the patient about management of nonradicular low back pain..    .    Dragon dictation was used for this note. Speech recognition errors are a possibility.     MEDICATIONS:  Current Outpatient Medications   Medication Sig Dispense Refill     COUMADIN 2.5 mg tablet TAKE ONE-HALF TO ONE TABLET (1.25 MG TO 2.5 MG) BY MOUTH DAILY. ADJUST DOSE BASED ON INR RESULTS AS DIRECTED. 60 tablet 1     DIGOX 125 mcg tablet TAKE ONE TABLET BY MOUTH ONE TIME DAILY  30 tablet 11     furosemide (LASIX) 20 MG tablet TAKE ONE TABLET BY MOUTH TWICE DAILY  90 tablet 3     metoprolol succinate (TOPROL-XL) 50 MG 24 hr tablet TAKE ONE TABLET BY MOUTH ONE TIME DAILY 90 tablet 2     pantoprazole (PROTONIX) 40 MG tablet TAKE ONE TABLET BY MOUTH ONE TIME DAILY  90 tablet 2     potassium chloride (KLOR-CON) 10 MEQ CR tablet Take 10 mEq by mouth daily.  3     clindamycin (CLEOCIN) 300 MG capsule Prior to dental appointments  0     COUMADIN 1 mg tablet Take 1 to 2 tablets (1 to 2 mg) by mouth daily. Adjust dose based on INR results as directed.(uses in conjunction with 2.5 mg tabs). 60 tablet 1     tiZANidine (ZANAFLEX) 2 MG tablet Take 1 tablet (2 mg total) by mouth every 6 (six) hours as needed. 30 tablet 1     traMADol (ULTRAM) 50 mg tablet One half to one four times daily as needed for back pain. 30 tablet 0     Current Facility-Administered Medications   Medication Dose Route Frequency Provider Last Rate Last Dose     denosumab 60 mg (PROLIA 60 mg/ml)  60 mg Subcutaneous Q6 Months Surya Delaney MD   60 mg at 08/13/18 5291

## 2021-06-23 ENCOUNTER — COMMUNICATION - HEALTHEAST (OUTPATIENT)
Dept: INTERNAL MEDICINE | Facility: CLINIC | Age: 86
End: 2021-06-23

## 2021-06-23 DIAGNOSIS — M81.0 OSTEOPOROSIS: ICD-10-CM

## 2021-06-23 DIAGNOSIS — Z92.29 PERSONAL HISTORY OF OTHER DRUG THERAPY: ICD-10-CM

## 2021-06-23 NOTE — TELEPHONE ENCOUNTER
Patient returned call and discussed warfarin dose actually taken. Dosing changed please see call note.

## 2021-06-23 NOTE — TELEPHONE ENCOUNTER
ANTICOAGULATION  MANAGEMENT    Assessment     Today's INR result of 2.3 is Therapeutic (goal INR of 2.0-3.0)        Less warfarin taken than instructed which may be affecting INR    No new diet changes affecting INR    No new medication/supplements affecting INR    Continues to tolerate warfarin with no reported s/s of bleeding or thromboembolism     Previous INR was Subtherapeutic    Plan:     Spoke with Helen regarding INR result and instructed:     Warfarin Dosing Instructions:  Change warfarin dose to 2 mg daily on Tuesdays and Saturdays; and 1.25 mg daily rest of week  (0 % change)This is 0.25 mg more then she took in the past week.    Instructed patient to follow up no later than: 1-2 weeks.    Education provided: importance of therapeutic range, importance of following up for INR monitoring at instructed interval and importance of taking warfarin as instructed    Helen verbalizes understanding and agrees to warfarin dosing plan.    Instructed to call the [facility] for any changes, questions or concerns. (#[phone])   ?   Savanna Freeman RN    Subjective/Objective:      Helen Beckham, a 88 y.o. female is on warfarin.     Helen reports:     Home warfarin dose: template incorrect; verbally confirmed home dose with Helen and updated on anticoagulation calendar     Missed doses: No     Medication changes:  No     S/S of bleeding or thromboembolism:  No     New Injury or illness:  No     Changes in diet or alcohol consumption:  No     Upcoming surgery, procedure or cardioversion:  No    Anticoagulation Episode Summary     Current INR goal:   2.0-3.0   TTR:   76.2 % (4.2 y)   Next INR check:   2/12/2019   INR from last check:      Weekly max warfarin dose:      Target end date:      INR check location:      Preferred lab:      Send INR reminders to:   ANTICOAGULATION POOL A (WBY,WBE,MID,RSC)    Indications    Chronic atrial fibrillation (H) [I48.2]           Comments:            Anticoagulation Care  Providers     Provider Role Specialty Phone number    Surya Delaney MD Referring Internal Medicine [phone]

## 2021-06-23 NOTE — TELEPHONE ENCOUNTER
Recommendations relayed to pt.  Pt verbalized understanding and since she couldn't get in with Dr Mathew - she called PCP, digoxin was stopped, and she will see PCP 1/25.  Pt would like to hold off on Dr Mathew's recommendations until seen by PCP.    Update sent to Dr Mtahew.  Holter and dig level ordered.  -nilda    From: Aaron Mathew   Sent: Friday, January 18, 2019 9:24 AM  To: Allison Hector <[email or web address]  Subject: RE: RE:    On karen. Would suggest a holter. She can try decreasing the metoprolol to 25mg instead of 50 MG and monitor blood pressure and pulse.  Would also suggest a digoxin level. Is she more short of breath ?

## 2021-06-23 NOTE — TELEPHONE ENCOUNTER
Pt 45-50 bpm heart rate and not sure why  On metoprolol 50 mg once a day at night and digoxin 125 mcg at night  Shortness of breath with exertion  Doesn't feel like passing out at all  With sitting she feels fine  No palpitations  No dizziness  BP is ok    Should she adjust her meds?  Coumadin at night also. Asking since pulse is low should she reduce any meds?    wants MD advice.    Kadie Quinteros RN [facility] RN Triage      BP Readings from Last 3 Encounters:   01/17/19 128/64   11/27/18 136/74   08/13/18 138/70     Kadie Quinteros RN [facility] RN Triage      Reason for Disposition    Heart beating very slowly (e.g., < 50 / minute) (EXCEPTION: athlete)    Protocols used: HEART RATE AND HEARTBEAT [id number]

## 2021-06-23 NOTE — TELEPHONE ENCOUNTER
ANTICOAGULATION  MANAGEMENT    Assessment     Today's INR result of 2.3 is Therapeutic (goal INR of 2.0-3.0)        Warfarin taken as previously instructed asked her to clarify if 2 mg or 1 mg on thur last week, will go forward with 2 mg x 3 days    No new diet changes affecting INR    No new medication/supplements affecting INR    Continues to tolerate warfarin with no reported s/s of bleeding or thromboembolism     Previous INR was Subtherapeutic    Plan:     Left a detailed message for Helen regarding INR result and instructed:     Warfarin Dosing Instructions:  Continue current warfarin dose 2 mg daily on tue.thu/sat; and 1.25 mg daily rest of week  (7.3 % change)    Instructed patient to follow up no later than: 1-2 weeks    Instructed to call the [facility] for any changes, questions or concerns. (#[phone])   ?   Jack Curtis RN    Subjective/Objective:      Helen Beckham, a 88 y.o. female is on warfarin.     Helen reports:     Home warfarin dose: as updated on anticoagulation calendar per template asked for call back to confirm     Missed doses: No     Medication changes:  No     S/S of bleeding or thromboembolism:  No     New Injury or illness:  No     Changes in diet or alcohol consumption:  No     Upcoming surgery, procedure or cardioversion:  No    Anticoagulation Episode Summary     Current INR goal:   2.0-3.0   TTR:   76.2 % (4.2 y)   Next INR check:   2/12/2019   INR from last check:   2.30 (1/29/2019)   Weekly max warfarin dose:      Target end date:      INR check location:      Preferred lab:      Send INR reminders to:   ANTICOAGULATION POOL A (WBY,WBE,MID,RSC)    Indications    Chronic atrial fibrillation (H) [I48.2]           Comments:            Anticoagulation Care Providers     Provider Role Specialty Phone number    Surya Delaney MD Referring Internal Medicine [phone]

## 2021-06-23 NOTE — TELEPHONE ENCOUNTER
Pt reports HR has been all over the place and low.  Documented HR is 48 & BP was 128/64, pt reports HR 43-51.  Only symptom pt reports is shortness of breath when walking.     Pt currently takes metoprolol succinate 50 mg daily, and digoxin 125 mcg daily.    Informed pt she is due for office visit with Dr Mathew.    Message sent to Dr Mathew for recommendations.  -[facility]

## 2021-06-23 NOTE — PATIENT INSTRUCTIONS - HE
1.  Increase metoprolol back to 50 mg in PM.    2.  Report BP and pulse in two weeks.    3.  Clinic follow-up in one month

## 2021-06-23 NOTE — PROGRESS NOTES
ASSESSMENT:  1. Chronic atrial fibrillation (H)  She called recently after noting fatigue and a low pulse in the 50s.  Was advised to stop digoxin.  She also called Dr. Mathew's office who advised cutting the metoprolol in half.  On the lower metoprolol dose,  pulse was somewhat higher than optimal.  I would favor increasing this to 50 mg daily.  She has had some issues with fatigue with beta-blockers in the past  - metoprolol succinate (TOPROL-XL) 50 MG 24 hr tablet; Take 1 tablet (50 mg total) by mouth daily.  Dispense: 90 tablet; Refill: 3    2. Essential hypertension  Blood pressure was elevated when checked by MD.  Home readings have been borderline to slightly high.  This hopefully will improve on the higher metoprolol dose  - furosemide (LASIX) 20 MG tablet; Take 1 tablet (20 mg total) by mouth daily.  Dispense: 90 tablet; Refill: 3    3. Edema  She is taking furosemide 20 mg once daily in the a.m.  He wears compressive stockings.  Control of edema seems acceptable  - furosemide (LASIX) 20 MG tablet; Take 1 tablet (20 mg total) by mouth daily.  Dispense: 90 tablet; Refill: 3    4.  Low back pain  She is not using any analgesics currently.   She is planning physical therapy for the future.    PLAN:  Patient Instructions   1.  Increase metoprolol back to 50 mg in PM.    2.  Report BP and pulse in two weeks.    3.  Clinic follow-up in one month    4.  Stay off digoxin for now      Medications Discontinued During This Encounter   Medication Reason     DIGOX 125 mcg tablet Therapy completed     metoprolol succinate (TOPROL-XL) 50 MG 24 hr tablet      traMADol (ULTRAM) 50 mg tablet Therapy completed     tiZANidine (ZANAFLEX) 2 MG tablet Therapy completed     furosemide (LASIX) 20 MG tablet              ASSESSED PROBLEMS:  1. Chronic atrial fibrillation (H)  metoprolol succinate (TOPROL-XL) 50 MG 24 hr tablet   2. Essential hypertension  furosemide (LASIX) 20 MG tablet   3. Edema  furosemide (LASIX) 20 MG tablet        CHIEF COMPLAINT:  Chief Complaint   Patient presents with     Follow-up     Blood pressure, heart       HISTORY OF PRESENT ILLNESS:  Helen is a 88 y.o. female presenting to the clinic today for medication monitoring with bradycardia, back pain, leg edema, and fatigue.     Bradycardia: Her heart rate was down to 43 for a while. So she called ahead and was advised to come it, and get off the digoxin by PCP. Other Dr. Office recommended she go off 1/2 the metoprolol. She has been taking bp at home with 148/98 reading yesterday. Now her HR is all over the place. She has had a dull headache since coming down on the metoprolol, and she does not get headaches often. She does not have any breast problems with the digoxin which is a stated side effect from the drug store. Bps 137 and 142/78 this am. She goes to [employer] and walks the whole store just fine. 138/86 and 168/84 on recheck today. HR in the 90s on physical exam today and 99 at check in.     Back pain: She has not started PT yet as she wants to get the HR under control first. She recently changed the position she sleeps in and that has helped.     Leg edema: She has some swelling in her legs and is taking a diuretic daily. Her legs hurt to palpation, and she states they have hurt for years.     Fatigue: She gets very tired, and is annoyed because she will get up in the morning, go out to the porch, and fall back asleep. She thinks the metoprolol has made her tired in the past so she takes it at night.     REVIEW OF SYSTEMS:   Denies medication side effects, weakness,   Admits to legs hurting, low energy  All other systems are negative.    PFSH:  She walks all around [employer] foods when she goes shopping and tolerates the activity well.   Reviewed as below.     TOBACCO USE:  Social History     Tobacco Use   Smoking Status Never Smoker   Smokeless Tobacco Never Used       VITALS:  Vitals:    01/25/19 1113 01/25/19 1132   BP: 138/86 168/80   Patient Site: Left Arm     Patient Position: Sitting    Cuff Size: Adult Regular    Pulse: 99    SpO2: 97%    Weight: 128 lb (58.1 kg)      Wt Readings from Last 3 Encounters:   01/25/19 128 lb (58.1 kg)   11/27/18 128 lb (58.1 kg)   08/13/18 129 lb (58.5 kg)     Body mass index is 23.79 kg/m .    PHYSICAL EXAM:  Constitutional:   Reveals an alert talkative elderly woman, in no acute distress, affect appropriate.  168/84 on recheck. Vitals: per nursing notes.  HEENT:atraumatic  Ears:  External canals, TMs clear.    Eyes:  EOMs full, PERRL.  Oropharynx:   Mouth and throat clear, no thrush or exudate.  Neck:  Supple, no carotid bruits or adenopathy.  Back:  No spine or CVA pain.  Thorax:  No bony deformities.  Lungs: Clear to A&P without rales or wheezes.  Respiratory effort normal.  Cardiac: irregularly irregular, rate 90s, Normal S1, S2,  no murmur or gallop.  Breasts:   No masses, no axillary adenopathy.  Abdomen:  Soft, active bowel sounds without bruits, mass, or tenderness.  Extremities: Trace ankle edema, pulses in the feet intact.    Skin:  No abnormal pallor, No jaundice, peripheral cyanosis or lesions to suggest malignancy.  Neuro:  Alert and oriented. No gross focal deficits.  Psychiatric:  Memory intact, mood appropriate.    QUALITY MEASURES:  The following high BMI interventions were performed this visit: encouragement to exercise    ADDITIONAL HISTORY SUMMARIZED (2): 1/17/19, 1/18/19, 12/27/18, 12/13/18 telephone encounters reviewed showing bradycardia and medication change recommendations.   DECISION TO OBTAIN EXTRA INFORMATION (1): None.   RADIOLOGY TESTS (1): None.  LABS (1): 1/18/17, 8/13/18, labs reviwed  MEDICINE TESTS (1): None.  INDEPENDENT REVIEW (2 each): None.     The visit lasted a total of 17 minutes face to face with the patient. Over 50% of the time was spent counseling and educating the patient about Bradycardia,, Afib, edema, and fatigue.    Steffen CENTENO am scribing for and in the presence of,   Rhett.    I, Surya Delaney, personally performed the services described in this documentation, as scribed by Steffen Mclean in my presence, and it is both accurate and complete.    Dragon dictation was used for this note.  Speech recognition errors are a possibility.    MEDICATIONS:  Current Outpatient Medications   Medication Sig Dispense Refill     clindamycin (CLEOCIN) 300 MG capsule Prior to dental appointments  0     COUMADIN 1 mg tablet Take 1 to 2 tablets (1 to 2 mg) by mouth daily. Adjust dose based on INR results as directed.(uses in conjunction with 2.5 mg tabs). 60 tablet 1     COUMADIN 2.5 mg tablet TAKE ONE-HALF TO ONE TABLET (1.25 MG TO 2.5 MG) BY MOUTH DAILY. ADJUST DOSE BASED ON INR RESULTS AS DIRECTED. 60 tablet 1     furosemide (LASIX) 20 MG tablet Take 1 tablet (20 mg total) by mouth daily. 90 tablet 3     metoprolol succinate (TOPROL-XL) 50 MG 24 hr tablet Take 1 tablet (50 mg total) by mouth daily. 90 tablet 3     pantoprazole (PROTONIX) 40 MG tablet TAKE ONE TABLET BY MOUTH ONE TIME DAILY  90 tablet 2     potassium chloride (KLOR-CON) 10 MEQ CR tablet Take 10 mEq by mouth daily.  3     Current Facility-Administered Medications   Medication Dose Route Frequency Provider Last Rate Last Dose     denosumab 60 mg (PROLIA 60 mg/ml)  60 mg Subcutaneous Q6 Months Surya Delaney MD   60 mg at 08/13/18 1308       Total data points: 3

## 2021-06-23 NOTE — TELEPHONE ENCOUNTER
----- Message from Titus Malik sent at 1/17/2019 12:36 PM CST -----  Contact: Helen  General phone call:    Caller: Helen  Primary cardiologist: Dr. Mathew  Detailed reason for call: Patient has been having funny sensations in her heart she feels as though her heart rate is off  New or active symptoms? Active  Best phone number: [phone]  Best time to contact: anytime  Ok to leave a detailed message? yes  Device? no    Additional Info:

## 2021-06-23 NOTE — TELEPHONE ENCOUNTER
Who is calling:  Patient   Reason for Call:  Patient calling back saying she missed a call.   Writer did alert her that [facility] left her a detailed message and to check that message.   Patient was wishing to speak with [facility] staff directly.  Date of last appointment with primary care: 01/25/19  Has the patient been recently seen:  Yes  Okay to leave a detailed message: No- patient would like to speak with [facility]

## 2021-06-23 NOTE — TELEPHONE ENCOUNTER
ANTICOAGULATION  MANAGEMENT    Assessment     Today's INR result of 1.9 is Subtherapeutic (goal INR of 2.0-3.0)        Warfarin taken as previously instructed    No new diet changes affecting INR    No new medication/supplements affecting INR    Continues to tolerate warfarin with no reported s/s of bleeding or thromboembolism     Previous INR was Therapeutic    Plan:     Spoke with Helen regarding INR result and instructed:     Warfarin Dosing Instructions:  have a booster tonight 2 mg, then continue current warfarin dose 2 mg daily on tue/sat; and 1.25 mg daily rest of week  (0 % change)    Instructed patient to follow up no later than: two weeks      Helen verbalizes understanding and agrees to warfarin dosing plan.    Instructed to call the [facility] for any changes, questions or concerns. (#[phone])   ?   Jack Curtis RN    Subjective/Objective:      Helen Beckham, a 88 y.o. female is on warfarin.     Helen reports:     Home warfarin dose: as updated on anticoagulation calendar per template     Missed doses: No     Medication changes:  No     S/S of bleeding or thromboembolism:  No     New Injury or illness:  No     Changes in diet or alcohol consumption:  No     Upcoming surgery, procedure or cardioversion:  No    Anticoagulation Episode Summary     Current INR goal:   2.0-3.0   TTR:   76.2 % (4.1 y)   Next INR check:   1/31/2019   INR from last check:   1.90! (1/17/2019)   Weekly max warfarin dose:      Target end date:      INR check location:      Preferred lab:      Send INR reminders to:   ANTICOAGULATION POOL A (WBY,WBE,MID,RSC)    Indications    Chronic atrial fibrillation (H) [I48.2]           Comments:            Anticoagulation Care Providers     Provider Role Specialty Phone number    Surya Delaney MD Referring Internal Medicine [phone]

## 2021-06-24 NOTE — TELEPHONE ENCOUNTER
New Appointment Needed  What is the reason for the visit:    Inpatient/ED Follow Up Appt Request  At what hospital or facility were you seen?: [facility]  What is the reason you were seen?: pacemaker and defibulator  What date were you admitted?: date: 2/27/2019  What date were you discharged?: date: 3/2/2019  What was the recommended timeframe for your follow up appointment?: Today  Provider Preference: PCP only  How soon do you need to be seen?: today  Waitlist offered?: No  Okay to leave a detailed message:  Yes    Patient only has time available to day and needs to get inr done today also/.     [facility] 2/27 - 3/2 PACE MAKER AND DEFIBULATOR AND INR

## 2021-06-24 NOTE — TELEPHONE ENCOUNTER
Left message for patient to call back. Dr Delaney states okay to see this Thursday (he just opened a new Thursday schedule this week).

## 2021-06-24 NOTE — PATIENT INSTRUCTIONS - HE
1.  Continue metoprolol 25 mg twice daily    2.  Could consider switch from Warfarin to Eliquis in the future if desired.  Would want to check price.    3.  Consider switching heart care to one center.    4.  See in three months

## 2021-06-24 NOTE — TELEPHONE ENCOUNTER
Who is calling:  Patient  Reason for Call:  Patient is returning phone call from [facility] regarding INR result and dosing for today .  Date of last appointment with primary care: Na  Okay to leave a detailed message: Yes

## 2021-06-24 NOTE — PROGRESS NOTES
ASSESSMENT:  1. Chronic atrial fibrillation (H)  She is seen in follow-up after a recent hospitalization at [facility] after a syncopal spell.  She actually had a Holter monitor on due to serious bradycardia noted at last clinic appointment.  The Holter revealed atrial fibrillation with periods of third-degree heart block, and short episodes of ventricular tachycardia.  It is felt that the syncope was either related to the ventricular tachycardia or the heart block.  She had an ICD placed, and is back on dose of metoprolol.  Anticoagulation was reversed with vitamin K.  She is not yet therapeutic on warfarin  - INR  - INR    2.  Congestive heart failure due to diastolic dysfunction.  Ejection fraction was 66% on 2/28.  I suspect that recent symptoms of shortness of breath and findings suggesting heart failure were related to profound bradycardia    3. Encounter for medication monitoring  Basic metabolic panel will be checked  - Basic Metabolic Panel    4. Torsades de pointes (H)  She did have long QT interval with ventricular tachycardia suggesting torsade while at [facility].  Single-chamber ICD was placed for this    5. Complete heart block (H)  This persisted after stopping digoxin and metoprolol.  Pacemaker has been placed    6. ICD (implantable cardioverter-defibrillator), single, in situ  See above.  The ICD site looks good with no evidence for infection        PLAN:  Patient Instructions   1.  Continue metoprolol 25 mg twice daily    2.  Could consider switch from Warfarin to Eliquis in the future if desired.  Would want to check price of Eliquis.    3.  Consider switching heart care to one center.    4.  See in three months    5.  Check basic metabolic panel today    Orders Placed This Encounter   Procedures     Basic Metabolic Panel     Medications Discontinued During This Encounter   Medication Reason     pantoprazole (PROTONIX) 40 MG tablet Duplicate order     metoprolol succinate (TOPROL-XL) 50 MG 24 hr tablet  Duplicate order     furosemide (LASIX) 20 MG tablet Duplicate order     COUMADIN 1 mg tablet        Return in about 3 months (around 6/12/2019) for Recheck.    All of the patient's medications were reconciled.     ASSESSED PROBLEMS:  1. Encounter for medication monitoring  Basic Metabolic Panel   2. Chronic atrial fibrillation (H)  INR    INR   3. Persistent atrial fibrillation (H)     4. Torsades de pointes (H)     5. Complete heart block (H)     6. ICD (implantable cardioverter-defibrillator), single, in situ     7. Medication monitoring encounter  CANCELED: Basic Metabolic Panel       CHIEF COMPLAINT:  Chief Complaint   Patient presents with     Follow-up     also had INR done today       HISTORY OF PRESENT ILLNESS:  Helen is a 88 y.o. female presenting to the clinic today for a hospital follow up. She was in [facility] from 2/27-3/2/2019 after an episode of syncope and for pacemaker placement. She had an episode of syncope in the kitchen and hit her head. She crawled to the living room and called for help. When the paramedics got there, they helped her up and checked her vitals. They then brought her to [facility]. She had been wearing a Holter monitor because of bradycardia and was scheduled to return it the day she passed out. The Holter monitor showed a predominant rhythm of atrial fibrillation, third degree AV block and an idioventricular escape rhythm. It also showed frequent short runs of nonsustained ventricular tachycardia. She had a pacemaker defibrillator placed and has been doing well since then. A nurse is coming to her place tomorrow to follow up with her and the pacemaker. She was offered OT and PT as well. She was restarted on metoprolol twice daily and has been remembering to take it. She had quite a bit of bruising on her face and still has a sore neck from the fall. She is scheduled to follow up with cardiology on 4/2.     Atrial Fibrillation: Her INR has been subtherapeutic with it  "being 1.4 today, and she is not sure why. She was told she should switch from warfarin to Eliquis by the cardiologists at [facility], but she is not sure if she is ready to do this or not.     CHF: She takes furosemide 20 mg daily and has not had much extremity edema. Her breathing has been a little better.     REVIEW OF SYSTEMS:   She recalls being given magnesium while in the hospital. Her left wrist hurts. All other systems are negative.    PFSH:  She admits she does not always eat the best diet.     TOBACCO USE:  Social History     Tobacco Use   Smoking Status Never Smoker   Smokeless Tobacco Never Used       VITALS:  Vitals:    03/12/19 1257   BP: 102/66   Patient Site: Left Arm   Patient Position: Sitting   Cuff Size: Adult Regular   Pulse: 78   SpO2: 98%   Weight: 125 lb (56.7 kg)   Height: 5' 1.5\" (1.562 m)     Wt Readings from Last 3 Encounters:   03/12/19 125 lb (56.7 kg)   02/19/19 128 lb (58.1 kg)   01/25/19 128 lb (58.1 kg)     Body mass index is 23.24 kg/m .    PHYSICAL EXAM:  Constitutional:   Reveals an alert, pleasant, talkative woman. Does not appear acutely ill.  Vitals: per nursing notes.  HEENT: Residual ecchymoses above left eyee  Neck:  Supple, no carotid bruits or adenopathy.  Back:  No spine or CVA pain.  Thorax: Defibrillator left upper chest   Lungs: Clear to A&P without rales or wheezes.  Respiratory effort normal.  Cardiac:   Regular rate and rhythm, normal S1, S2, no murmur or gallop.  Abdomen:  Soft, active bowel sounds without bruits, mass, or tenderness.  Extremities:   No peripheral edema   Skin: No abnormal pallor.   Psychiatric:  Memory intact, mood appropriate.    ADDITIONAL HISTORY SUMMARIZED (2): Reviewed 2/27-3/2 [facility] note regarding syncope and pacemaker placement.   DECISION TO OBTAIN EXTRA INFORMATION (1): Accessed Care Everywhere    RADIOLOGY TESTS (1): None.  LABS (1): Labs from recent hospitalization reviewed. Labs ordered.   MEDICINE TESTS (1): Reviewed 2/26/2019 " Holter monitor showing abnormal Holter with predominant rhythm atrial fibrillation, third degree AV block and an idioventricular escape rhythm  INDEPENDENT REVIEW (2 each): None.     The visit lasted a total of 27 minutes face to face with the patient. Over 50% of the time was spent counseling and educating the patient about her recent hospitalization.    IGareth, am scribing for and in the presence of, Dr. Delaney.    I, Surya Delaney, personally performed the services described in this documentation, as scribed by Gareth Vaca in my presence, and it is both accurate and complete.    Dragon dictation was used for this note.  Speech recognition errors are a possibility.    MEDICATIONS:  Current Outpatient Medications   Medication Sig Dispense Refill     cholecalciferol, vitamin D3, 1,000 unit tablet Take 1,000 Units by mouth daily.       clindamycin (CLEOCIN) 300 MG capsule Prior to dental appointments  0     COUMADIN 1 mg tablet Take 1 to 2 tablets (1 to 2 mg) by mouth daily. Adjust dose based on INR results as directed.(uses in conjunction with 2.5 mg tabs) 60 tablet 1     COUMADIN 2.5 mg tablet TAKE ONE-HALF TO ONE TABLET (1.25 MG TO 2.5 MG) BY MOUTH DAILY. ADJUST DOSE BASED ON INR RESULTS AS DIRECTED. 60 tablet 1     cyanocobalamin 1000 MCG tablet Take 1,000 mcg by mouth daily.       furosemide (LASIX) 20 MG tablet Take 1 tablet (20 mg total) by mouth daily. 90 tablet 3     losartan (COZAAR) 50 MG tablet Take 50 mg by mouth daily.       metoprolol tartrate (LOPRESSOR) 25 MG tablet Take 25 mg by mouth 2 (two) times a day.       pantoprazole (PROTONIX) 40 MG tablet Take 40 mg by mouth daily.       potassium chloride (KLOR-CON) 10 MEQ CR tablet Take 1 tablet (10 mEq total) by mouth daily. 90 tablet 3     No current facility-administered medications for this visit.        Total data points: 5

## 2021-06-24 NOTE — TELEPHONE ENCOUNTER
Who is calling:  Patient  Reason for Call:  Calling back would like a call back asap about appointment today?  Date of last appointment with primary care: 2/19/19  Has the patient been recently seen:  Yes  Okay to leave a detailed message: Yes

## 2021-06-24 NOTE — TELEPHONE ENCOUNTER
ANTICOAGULATION  MANAGEMENT    Assessment     Today's INR result of 2.5 is Therapeutic (goal INR of 2.0-3.0)        Warfarin taken as previously instructed    No new diet changes affecting INR    No new medication/supplements affecting INR    Continues to tolerate warfarin with no reported s/s of bleeding or thromboembolism     Previous INR was Supratherapeutic    Plan:     Left a detailed message for Helen regarding INR result and instructed:     Warfarin Dosing Instructions:  Continue current warfarin dose 2 mg daily on tue/sat; and 1.25 mg daily rest of week  (0 % change)    Instructed patient to follow up no later than: two weeks with ov      Instructed to call the [facility] for any changes, questions or concerns. (#[phone])   ?   Jack Curtis RN    Subjective/Objective:      Helen Beckham, a 88 y.o. female is on warfarin.     Helen reports:     Home warfarin dose: as updated on anticoagulation calendar per template     Missed doses: No     Medication changes:  No     S/S of bleeding or thromboembolism:  No     New Injury or illness:  No     Changes in diet or alcohol consumption:  No     Upcoming surgery, procedure or cardioversion:  No    Anticoagulation Episode Summary     Current INR goal:   2.0-3.0   TTR:   75.9 % (4.2 y)   Next INR check:   3/12/2019   INR from last check:   2.50 (2/26/2019)   Weekly max warfarin dose:      Target end date:      INR check location:      Preferred lab:      Send INR reminders to:   ANTICOAGULATION POOL A (WBY,WBE,MID,RSC)    Indications    Chronic atrial fibrillation (H) [I48.2]           Comments:            Anticoagulation Care Providers     Provider Role Specialty Phone number    Surya Delaney MD Referring Internal Medicine [phone]

## 2021-06-24 NOTE — TELEPHONE ENCOUNTER
ANTICOAGULATION  MANAGEMENT    Assessment     Today's INR result of 1.6 is Subtherapeutic (goal INR of 2.0-3.0)        Warfarin taken as previously instructed    No new diet changes affecting INR    No new medication/supplements affecting INR    Continues to tolerate warfarin with no reported s/s of bleeding or thromboembolism     Previous INR was Subtherapeutic    Plan:     Left a detailed message for Helen regarding INR result and instructed:     Warfarin Dosing Instructions:  have 2 mg tonight to boost,  then continue current warfarin dose 2 mg daily on tue/sat; and 1.25 mg daily rest of week  (0 % change)    Instructed patient to follow up no later than: tue 3/12 with jenni visit      Instructed to call the [facility] for any changes, questions or concerns. (#[phone])   ?   Jack Curtis RN    Subjective/Objective:      Helen Beckham, a 88 y.o. female is on warfarin.     Helen reports:     Home warfarin dose: as updated on anticoagulation calendar per template     Missed doses: No     Medication changes:  No     S/S of bleeding or thromboembolism:  No     New Injury or illness:  No     Changes in diet or alcohol consumption:  No     Upcoming surgery, procedure or cardioversion:  No    Anticoagulation Episode Summary     Current INR goal:   2.0-3.0   TTR:   75.6 % (4.3 y)   Next INR check:   3/12/2019   INR from last check:   1.60! (3/8/2019)   Weekly max warfarin dose:      Target end date:      INR check location:      Preferred lab:      Send INR reminders to:   ANTICOAGULATION POOL A (WBY,WBE,MID,RSC)    Indications    Chronic atrial fibrillation (H) [I48.2]           Comments:            Anticoagulation Care Providers     Provider Role Specialty Phone number    Surya Delaney MD Referring Internal Medicine [phone]

## 2021-06-24 NOTE — TELEPHONE ENCOUNTER
Who is calling:  Patient  Reason for Call:  The patient is calling to request anticoagulation return her call for dosing and scheduling instructions. She missed the call earlier today.  Has the patient been recently seen:  Yes  Okay to leave a detailed message: Yes

## 2021-06-24 NOTE — TELEPHONE ENCOUNTER
Spoke with patient while she came for INR, and relay below message. She stated that she won't be able to come at 8 am on tomorrow because she not sure her son can drive for her or not. She also stated that she has follow up appt with heart clinic on Thursday, so she not available on Thursday. So, CA advised her to keep 3/12/19 appt.     Per pcp it ok to see patient on 3/12/19 after a follow appt from heart clinic.

## 2021-06-24 NOTE — TELEPHONE ENCOUNTER
Is there a clinical reason why patient needs to be on brand name? [facility] will not approve unless the patient has had an allergic reaction to the generic. Please clarify as I did not see anything in patients chart. Thank you!

## 2021-06-24 NOTE — TELEPHONE ENCOUNTER
Request has been submitted via [facility]. Waiting for questions to generate before completing PA.

## 2021-06-24 NOTE — TELEPHONE ENCOUNTER
ANTICOAGULATION  MANAGEMENT    Assessment     Today's INR result of 1.4 is Subtherapeutic (goal INR of 2.0-3.0)        Warfarin taken as previously instructed Helen is certain she took as planned    No new diet changes affecting INR    No new medication/supplements affecting INR    Continues to tolerate warfarin with no reported s/s of bleeding or thromboembolism     Previous INR was Subtherapeutic    Plan:     Spoke with Helen regarding INR result and instructed:     Warfarin Dosing Instructions:  Change warfarin dose to 1.25 mg daily on mon/wed/fri; and 2 mg daily rest of week  (14 % change)    Instructed patient to follow up no later than: one week      Helen verbalizes understanding and agrees to warfarin dosing plan.    Instructed to call the [facility] for any changes, questions or concerns. (#[phone])   ?   Jack Curtis RN    Subjective/Objective:      Helen Beckham, a 88 y.o. female is on warfarin.     Helen reports:     Home warfarin dose: as updated on anticoagulation calendar per template     Missed doses: No     Medication changes:  No     S/S of bleeding or thromboembolism:  No     New Injury or illness:  No     Changes in diet or alcohol consumption:  No     Upcoming surgery, procedure or cardioversion:  No    Anticoagulation Episode Summary     Current INR goal:   2.0-3.0   TTR:   75.4 % (4.3 y)   Next INR check:   3/19/2019   INR from last check:   1.40! (3/12/2019)   Weekly max warfarin dose:      Target end date:      INR check location:      Preferred lab:      Send INR reminders to:   ANTICOAGULATION POOL A (WBY,WBE,MID,RSC)    Indications    Chronic atrial fibrillation (H) [I48.2]           Comments:            Anticoagulation Care Providers     Provider Role Specialty Phone number    Surya Delaney MD Referring Internal Medicine [phone]

## 2021-06-24 NOTE — TELEPHONE ENCOUNTER
Fax received from [facility] pharmacy requesting Prior Authorization    Medication Name: Coumadin 1mg    Insurance Plan: [employer] Part D   PBM:    Patient ID Number: [id number]    Please start PA process

## 2021-06-24 NOTE — TELEPHONE ENCOUNTER
ANTICOAGULATION  MANAGEMENT    Assessment     Today's INR result of 3.4 is Supratherapeutic (goal INR of 2.0-3.0)        Warfarin taken as previously instructed    No new diet changes affecting INR    No new medication/supplements affecting INR    Continues to tolerate warfarin with no reported s/s of bleeding or thromboembolism     Previous INR was Therapeutic    Plan:     Left a detailed message for Helen regarding INR result and instructed:     Warfarin Dosing Instructions:  have 1 mg today to lower inr then change warfarin dose to 2 mg daily on tue/sat; and 1.25 mg daily rest of week  (0 % change)    Instructed patient to follow up no later than: 1-2 weeks      Helen verbalizes understanding and agrees to warfarin dosing plan.    Instructed to call the [facility] for any changes, questions or concerns. (#[phone])   ?   Jack Curtis RN    Subjective/Objective:      Helen Beckham, a 88 y.o. female is on warfarin.     Helen reports:     Home warfarin dose: as updated on anticoagulation calendar per template     Missed doses: No     Medication changes:  No     S/S of bleeding or thromboembolism:  No     New Injury or illness:  No     Changes in diet or alcohol consumption:  No     Upcoming surgery, procedure or cardioversion:  No    Anticoagulation Episode Summary     Current INR goal:   2.0-3.0   TTR:   76.0 % (4.2 y)   Next INR check:   2/26/2019   INR from last check:   3.40! (2/19/2019)   Weekly max warfarin dose:      Target end date:      INR check location:      Preferred lab:      Send INR reminders to:   ANTICOAGULATION POOL A (WBY,WBE,MID,RSC)    Indications    Chronic atrial fibrillation (H) [I48.2]           Comments:            Anticoagulation Care Providers     Provider Role Specialty Phone number    Surya Delaney MD Referring Internal Medicine [phone]

## 2021-06-24 NOTE — TELEPHONE ENCOUNTER
RN cannot approve Refill Request    RN can NOT refill this medication refill too soon Last refilled on 3/8/19. Last office visit: 2/19/2019 Surya Delaney MD Last Physical: Visit date not found Last MTM visit: Visit date not found Last visit same specialty: 2/19/2019 Surya Delaney MD.  Next visit within 3 mo: Visit date not found  Next physical within 3 mo: Visit date not found      Tripp Palmer, [facility] Triage/Med Refill 3/8/2019    Requested Prescriptions   Pending Prescriptions Disp Refills     potassium chloride (KLOR-CON) 10 MEQ CR tablet [Pharmacy Med Name: Potassium Chloride ER Oral Tablet Extended Release 10 MEQ] 60 tablet 2     Sig: TAKE ONE TABLET BY MOUTH TWICE DAILY    Potassium Supplements Refill Protocol Passed - 3/5/2019 10:33 AM       Passed - PCP or prescribing provider visit in past 12 months      Last office visit with prescriber/PCP: 2/19/2019 Surya Delaney MD OR same dept: 2/19/2019 Surya Delaney MD OR same specialty: 2/19/2019 Surya Delaney MD  Last physical: Visit date not found Last MTM visit: Visit date not found   Next visit within 3 mo: Visit date not found  Next physical within 3 mo: Visit date not found  Prescriber OR PCP: Surya Delaney MD  Last diagnosis associated with med order: 1. Diuretic-induced hypokalemia  - potassium chloride (KLOR-CON) 10 MEQ CR tablet [Pharmacy Med Name: Potassium Chloride ER Oral Tablet Extended Release 10 MEQ]; TAKE ONE TABLET BY MOUTH TWICE DAILY   Dispense: 60 tablet; Refill: 2    If protocol passes may refill for 12 months if within 3 months of last provider visit (or a total of 15 months).            Passed - Potassium level in last 12 months    Lab Results   Component Value Date    Potassium 4.1 02/19/2019

## 2021-06-24 NOTE — PROGRESS NOTES
ASSESSMENT:  1. Shortness of breath  Helen notes shortness of breath with minor exertion.  Currently has profound bradycardia despite stopping digoxin at last visit.  I suspect shortness of breath is largely related to the low ventricular rate.  She also could have a component of diastolic cardiac dysfunction.  Heart size is somewhat enlarged by chest x-ray.  Metoprolol will be discontinued with a Holter monitor checked next week.  If heart rate increases dramatically off of metoprolol she will restarted at 25 mg daily.  Labs will be done to screen for anemia and CHF  - BNP(B-type Natriuretic Peptide)  - XR Chest 2 Views  - HM2(CBC w/o Differential)  - Electrocardiogram Perform - Clinic  - Holter Monitor; Future    2. Chronic atrial fibrillation (H)  Heart rate is dramatically slow despite stopping digoxin as above.  Metoprolol will be held  - Electrocardiogram Perform - Clinic  - INR  - Holter Monitor; Future    3. Encounter for medication monitoring  Monitoring labs will be checked  - Basic Metabolic Panel    4. Age-related osteoporosis without current pathological fracture  She will continue Prolia  - denosumab 60 mg (PROLIA 60 mg/ml); Inject 1 mL (60 mg total) under the skin once.          PLAN:  Patient Instructions   1.  Stop Metoprolol.  Resume 25 mg daily for pulse over 70 on average.    2. Check Holter monitor next week.    3.  I will notify you of test results    4.  Prolia today    5.  Shingrix at a later date    6.  See in one month    7. Patient was educated on safety of Prolia utilizing Patient Counseling Chart for Healthcare Providers, as outlined by the Prolia REMS progam.     Orders Placed This Encounter   Procedures     XR Chest 2 Views     Order Specific Question:   Can the procedure be changed per Radiologist protocol?     Answer:   Yes     BNP(B-type Natriuretic Peptide)     HM2(CBC w/o Differential)     Basic Metabolic Panel     Electrocardiogram Perform - Clinic     There are no discontinued  medications.  Administrations This Visit     denosumab 60 mg (PROLIA 60 mg/ml)     Admin Date  02/19/2019 Action  Given Dose  60 mg Route  Subcutaneous Administered By  Rufino Taylor CMA              Return in about 4 weeks (around 3/19/2019) for Recheck.      ASSESSED PROBLEMS:  1. Shortness of breath  BNP(B-type Natriuretic Peptide)    XR Chest 2 Views    HM2(CBC w/o Differential)    Electrocardiogram Perform - Clinic    Holter Monitor   2. Chronic atrial fibrillation (H)  Electrocardiogram Perform - Clinic    INR    Holter Monitor   3. Encounter for medication monitoring  Basic Metabolic Panel   4. Age-related osteoporosis without current pathological fracture  denosumab 60 mg (PROLIA 60 mg/ml)       CHIEF COMPLAINT:  Chief Complaint   Patient presents with     Follow-up     Injections     Prolia       HISTORY OF PRESENT ILLNESS:  Helen is a 88 y.o. female presenting to the clinic today to follow up on her shortness of breath and fatigue.     Shortness of Breath: She continues to feel short of breath. She denies orthopnea but walking has been difficult. [place] because she was not sure she would be able to make it. She is not sure what is causing this shortness of breath. She occasionally has a dry cough and seems to have a little bit of a wheeze with it. She has had shortness of breath in the past but is concerned because this feels different to her.     Fatigue: She was taken off of digoxin earlier this year due to complaints of fatigue and bradycardia, but she has continued to feel fatigued. She continues to take metoprolol 50 mg daily and recalls that she has had issues with fatigue related to this medication in the past. Her pulse is usually around 45 or 50 when she checks at home, but it does fluctuate. A Holter monitor was ordered by cardiology, but she has not scheduled this yet.     Edema: She denies leg swelling, but she took a dose of her furosemide 20 mg when she woke  up at 2 AM and was up frequently the rest of the night urinating.     Osteoporosis: She is due for a Prolia injection today.     Health Maintenance: She got the first Shingrix vaccine in August 2018. She developed a sore arm and some redness around the injection site and inquires if she should get the second one.     REVIEW OF SYSTEMS:   She has occasional aches and pains in her neck. She is ambulating with a cane. She has a known hiatal hernia. All other systems are negative.    PFSH:  She has not been getting out of her house as much as usual lately.     TOBACCO USE:  Social History     Tobacco Use   Smoking Status Never Smoker   Smokeless Tobacco Never Used       VITALS:  Vitals:    02/19/19 1224   BP: 128/62   Patient Site: Left Arm   Patient Position: Sitting   Cuff Size: Adult Regular   Pulse: (!) 56   SpO2: 97%   Weight: 128 lb (58.1 kg)     Wt Readings from Last 3 Encounters:   02/19/19 128 lb (58.1 kg)   01/25/19 128 lb (58.1 kg)   11/27/18 128 lb (58.1 kg)     Body mass index is 23.79 kg/m .    PHYSICAL EXAM:  Constitutional:   Reveals an alert, talkative woman. Ambulates with a cane and appears slightly unsteady.  Vitals: per nursing notes.  HEENT: Atraumatic.   Neck:  Supple, no carotid bruits or adenopathy.  Back: No abnormal kyphosis.   Thorax:  No bony deformities.  Lungs: Clear to A&P  Cardiac:  Irregularly irregular rhythm, rate in the 40's. No murmurs.   Abdomen:  Soft, active bowel sounds without bruits, mass, or tenderness.  Extremities:  No significant edema.    Skin:  No abnormal pallor.   Neuro:  Alert and oriented. Cranial nerves, motor, sensory exams are intact.  No gross focal deficits.  Psychiatric:  Memory intact, mood appropriate.    EKG: Atrial fibrillation with slow ventricular rate of 39, right bundle branch block with non-specific ST-T wave changes.     Chest X-ray: Moderate cardiomegaly with hiatal hernia.     ADDITIONAL HISTORY SUMMARIZED (2): None.  DECISION TO OBTAIN EXTRA  INFORMATION (1): None.   RADIOLOGY TESTS (1): Chest X-ray ordered.   LABS (1): Labs ordered.   MEDICINE TESTS (1): EKG ordered.   INDEPENDENT REVIEW (2 each): X-ray from today personally read. EKG from today personally read.     The visit lasted a total of 18 minutes face to face with the patient. Over 50% of the time was spent counseling and educating the patient about her fatigue and shortness of breath.    I, Gareth Vaca, am scribing for and in the presence of, Dr. Delaney.    I, Surya Delaney, personally performed the services described in this documentation, as scribed by Gareth Vaca in my presence, and it is both accurate and complete.    Dragon dictation was used for this note.  Speech recognition errors are a possibility.    MEDICATIONS:  Current Outpatient Medications   Medication Sig Dispense Refill     clindamycin (CLEOCIN) 300 MG capsule Prior to dental appointments  0     COUMADIN 1 mg tablet Take 1 to 2 tablets (1 to 2 mg) by mouth daily. Adjust dose based on INR results as directed.(uses in conjunction with 2.5 mg tabs). 60 tablet 1     COUMADIN 2.5 mg tablet TAKE ONE-HALF TO ONE TABLET (1.25 MG TO 2.5 MG) BY MOUTH DAILY. ADJUST DOSE BASED ON INR RESULTS AS DIRECTED. 60 tablet 1     furosemide (LASIX) 20 MG tablet Take 1 tablet (20 mg total) by mouth daily. 90 tablet 3     metoprolol succinate (TOPROL-XL) 50 MG 24 hr tablet Take 1 tablet (50 mg total) by mouth daily. 90 tablet 3     pantoprazole (PROTONIX) 40 MG tablet TAKE ONE TABLET BY MOUTH ONE TIME DAILY  90 tablet 2     potassium chloride (KLOR-CON) 10 MEQ CR tablet Take 10 mEq by mouth daily.  3     No current facility-administered medications for this visit.        Total data points: 7

## 2021-06-24 NOTE — TELEPHONE ENCOUNTER
Patient Returning Call  Reason for call:  Patient returned missed call  Information relayed to patient:  Please call patient when available. No extension left to transfer.  Patient has additional questions:  No  If YES, what are your questions/concerns:  n/a  Okay to leave a detailed message?: Yes

## 2021-06-24 NOTE — TELEPHONE ENCOUNTER
Who is calling:  patient  Reason for Call:  Patient was calling to let [facility] staff know that Eliquis will only cost her $24 as it is a tier 3 medication.    Date of last appointment with primary care: 03/12/19  Okay to leave a detailed message: Yes

## 2021-06-24 NOTE — TELEPHONE ENCOUNTER
ANTICOAGULATION  MANAGEMENT    Assessment     Today's INR result of 1.4 is Subtherapeutic (goal INR of 2.0-3.0)        Warfarin taken as previously instructed perhaps doses were held in [facility]    No new diet changes affecting INR    No new medication/supplements affecting INR    Continues to tolerate warfarin with no reported s/s of bleeding or thromboembolism     Previous INR was Subtherapeutic     New pacemaker at [facility]  after falling at home     Plan:     Spoke with Helen regarding INR result and instructed:     Warfarin Dosing Instructions:  have 2 mg tonight to boost,  then continue current warfarin dose 2 mg daily on tue/sat; and 1.25 mg daily rest of week  (0 % change)    Instructed patient to follow up no later than: fri 3/8    Helen verbalizes understanding and agrees to warfarin dosing plan.    Instructed to call the [facility] for any changes, questions or concerns. (#[phone])   ?   Jack Curtis RN    Subjective/Objective:      Helenhi Beckham, a 88 y.o. female is on warfarin.     Helen reports:     Home warfarin dose: verbally confirmed home dose with Helen and updated on anticoagulation calendar     Missed doses: Yes: in hospital     Medication changes:  No     S/S of bleeding or thromboembolism:  No     New Injury or illness:  No     Changes in diet or alcohol consumption:  No     Upcoming surgery, procedure or cardioversion:  No    Anticoagulation Episode Summary     Current INR goal:   2.0-3.0   TTR:   75.8 % (4.2 y)   Next INR check:   3/8/2019   INR from last check:   1.40! (3/4/2019)   Weekly max warfarin dose:      Target end date:      INR check location:      Preferred lab:      Send INR reminders to:   ANTICOAGULATION POOL A (WBY,WBE,MID,RSC)    Indications    Chronic atrial fibrillation (H) [I48.2]           Comments:            Anticoagulation Care Providers     Provider Role Specialty Phone number    Surya Delaney MD Referring Internal Medicine [phone]

## 2021-06-24 NOTE — PATIENT INSTRUCTIONS - HE
1.  Stop Metoprolol.  Resume 25 mg daily for pulse over 70 on average.    2. Check Holter monitor    3.  I will notify you of test results    4.  Prolia today    5.  Shingrix at a later date    6.  See in one month

## 2021-06-24 NOTE — PROGRESS NOTES
The following steps were completed to comply with the REMS program for Prolia:  1. Ordering provider has previously reviewed information in the Medication Guide and Patient Counseling Chart, including the serious risks of Prolia  and the symptoms of each risk and have been advised  to seek prompt medical attention if they have signs or symptoms of any of the serious risks.  2. Provided each patient a copy of the Medication Guide and Patient Brochure.  See MAR for administration details.   Indication: Prolia  (denosumab) is a prescription medicine used to treat osteoporosis in patients who:   Are at high risk for fracture, meaning patients who have had a fracture related to osteoporosis, or who have multiple risk factors for fracture; Cannot use another osteoporosis medicine or other osteoporosis medicines did not work well.   The timeline for early/late injections would be 4 weeks early and any time after the 6 month kaitlin. If a patient receives their injection late, then the subsequent injection would be 6 months from the date that they actually received the injection    1.  When was the last injection?  8/13/18    2.  Has insurance for this injection been verified?  Yes    3.  Did you experience any new onset achiness or rashes that lasted for over a month with your previous Prolia injection?   No    4.  Do you have a fever over 101?F or a new deep cough that is unusual for you today? No    5.  Have you started any new medications in the last 6 months that you were told could affect your immune system? These may have been prescribed by oncologist, transplant, rheumatology, or dermatology.   No    6.  In the last 6 months have you have gastric bypass or parathyroid surgery?   No    7.  Do you plan dental work requiring drilling into the bone such as implants/extractions or oral surgery in the next 2-3 months?   No

## 2021-06-24 NOTE — TELEPHONE ENCOUNTER
Who is calling:  Patient   Reason for Call:  Concerned she missed call  Date of last appointment with primary care:   Has the patient been recently seen:    Okay to leave a detailed message: Yes    Patient was concerned she missed the call. Please call.

## 2021-06-24 NOTE — TELEPHONE ENCOUNTER
[facility] PA team  [phone]  Pool: HE PA MED ([id number])          PA has been initiated.       PA form completed and faxed insurance via Cover My Meds     Key:  [id number]     Medication:  COUMADIN 1MG    Insurance:  [employer]        Response will be received via fax and may take up to 5-10 business days depending on plan

## 2021-06-25 NOTE — TELEPHONE ENCOUNTER
ANTICOAGULATION  MANAGEMENT    Assessment     Today's INR result of 2.1 is Therapeutic (goal INR of 2.0-3.0)        Warfarin taken as previously instructed    No new diet changes affecting INR    No new medication/supplements affecting INR    Continues to tolerate warfarin with no reported s/s of bleeding or thromboembolism     Previous INR was Therapeutic    Plan:     Spoke with Helen regarding INR result and instructed:     Warfarin Dosing Instructions:  Continue current warfarin dose 1.25 mg daily on mon/wed/fri; and 2 mg daily rest of week  (0 % change)    Instructed patient to follow up no later than: one week      Helen verbalizes understanding and agrees to warfarin dosing plan.    Instructed to call the [facility] for any changes, questions or concerns. (#[phone])   ?   Jack Curtis RN    Subjective/Objective:      Helen Beckham, a 88 y.o. female is on warfarin.     Helen reports:     Home warfarin dose: as updated on anticoagulation calendar per template     Missed doses: No     Medication changes:  No     S/S of bleeding or thromboembolism:  No     New Injury or illness:  No     Changes in diet or alcohol consumption:  No     Upcoming surgery, procedure or cardioversion:  No    Anticoagulation Episode Summary     Current INR goal:   2.0-3.0   TTR:   75.1 % (4.3 y)   Next INR check:   3/19/2019   INR from last check:   1.40! (3/12/2019)   Most recent INR:    2.10 (3/19/2019)   Weekly max warfarin dose:      Target end date:      INR check location:      Preferred lab:      Send INR reminders to:   ANTICOAGULATION POOL A (WBY,WBE,MID,RSC)    Indications    Chronic atrial fibrillation (H) [I48.2]           Comments:            Anticoagulation Care Providers     Provider Role Specialty Phone number    Surya Delaney MD Referring Internal Medicine [phone]

## 2021-06-25 NOTE — PROGRESS NOTES
Progress Notes by Tunde Ma MD at 2/8/2017  2:20 PM     Author: Tunde Ma MD Service: -- Author Type: Physician    Filed: 2/8/2017  3:07 PM Encounter Date: 2/8/2017 Status: Signed    : Tunde Ma MD (Physician)           Click to link to [facility][note 3] [facility] Note    Helen Beckham was advised by Dr. Jose Guadalupe Stephens to meet with me today at the [facility] to evaluate bradycardia.     Assessment:    1. Permanent atrial fibrillation  Holter Monitor    Thyroid Stimulating Hormone (TSH)   2. Junctional rhythm         Plan:    Helen has a junctional rhythm in the setting of permanent atrial fibrillation.  She is on a combination of a beta-blocker and diltiazem.  This combination can result in profound bradycardia in a small percentage of individuals with the reaction occurring years after the medications are initiated.  On the other hand, she may also simply have conduction system disease related to aging.  Regardless, in attempt to spare her from empiric permanent cardiac pacemaking, I discontinued her diltiazem.  She will check her blood pressure and heart rate daily.  We will obtain a Holter monitor in 2 days time.  I indicated I will be out of the office next week, but the results could be directed to Dr. Aaron Mathew, her primary cardiologist.  It is possible that she will need a second antihypertensive agent that does not have negative chronotropic properties.  It is certainly possible that she will require and benefit from permanent cardiac pacemaking if her heart rate does not improve    An After Visit Summary was printed and given to the patient.    Current History:    Helen states she is felt fatigued and short of breath with exertion for several weeks.  She has been checking her pulse and it is been around 48 bpm.  Her son-in-law, who is a physician, advised that she cut her atenolol dose from 50 mg to 25 mg.   She still felt unwell so she saw Dr. Jose Guadalupe Stephens.  Her pulse was still slow so he reduced the dose of her diltiazem from 360 to 120 mg daily.  Today her pulse is 48.  She states she loves to go to the professional hockey games, but gave her tickets away tonight because she feels too tired.  She does not feel presyncopal nor has she had loss of consciousness.  She does not experience chest discomfort or orthopnea.  She has chronic lower extremity edema which she controls with compression stockings and diuretic use.    Patient Active Problem List   Diagnosis   ? Hyperlipidemia   ? Osteoporosis   ? Essential hypertension   ? Permanent atrial fibrillation   ? Esophageal Reflux   ? Diverticulosis   ? Osteoarthritis       Past Medical History:  Past Medical History:   Diagnosis Date   ? Cellulitis of third toe of right foot    ? Diverticulitis    ? Esophageal reflux    ? Essential hypertension     Created by Conversion  Replacement Utility updated for latest IMO load   ? Hyperlipidemia    ? Osteoporosis    ? Permanent atrial fibrillation      [id number] score of  5 with 2 points for age and 1 each for female, HTN and CHF---on chronic warfarin afib with RVR 3/26/14 new amio start.  4/24/15 off amio for ? Hair loss and fatigue 6/15 inpersistent afib and asymptomatic and switched to rate control    ? Trochanteric bursitis     osteoarthritis   ? Venous insufficiency        Past Surgical History:  Past Surgical History:   Procedure Laterality Date   ? EYE SURGERY      bilateral cataract surgery   ? FRACTURE SURGERY      left toe   ? JOINT REPLACEMENT     ? CO CARDIOVERSION ELECTIVE ARRHYTHMIA EXTERNAL      Description: Elective Cardioversion External;  Recorded: 05/19/2014;  Comments: 4/28/14   ? CO EXCISE HAND/FOOT NEUROMA      Description: Excision Of Neuroma Of Right Foot;  Proc Date: 04/01/2005;   ? CO RECONSTR TOTAL SHOULDER IMPLANT      Description: Shoulder Arthroplasty Total Shoulder Replacement;  Proc Date:  07/13/2011;  Comments: REVERSE TOTAL SHOULDER   ? TONSILLECTOMY         Family History:  Family History   Problem Relation Age of Onset   ? Heart disease Mother    ? No Medical Problems Father    ? COPD Sister    ? Kidney failure Brother    ? Liver disease Brother    ? No Medical Problems Son    ? No Medical Problems Daughter        Social History:   reports that she has never smoked. She has never used smokeless tobacco. She reports that she does not drink alcohol or use illicit drugs.    Medications:  Outpatient Encounter Prescriptions as of 2/8/2017   Medication Sig Dispense Refill   ? atenolol (TENORMIN) 50 MG tablet Take 1 tablet (50 mg total) by mouth bedtime.   1 tab in pm. (Patient taking differently: Take 25 mg by mouth bedtime. ) 90 tablet 3   ? furosemide (LASIX) 40 MG tablet Take 40 mg by mouth daily.     ? pantoprazole (PROTONIX) 40 MG tablet TAKE 1 TABLET (40 MG) BY MOUTH DAILY 90 tablet 3   ? potassium chloride SA (K-DUR,KLOR-CON) 10 MEQ tablet Take 1 tablet (10 mEq total) by mouth 2 (two) times a day. 180 tablet 3   ? warfarin (COUMADIN) 2.5 MG tablet Take 0.5- 1 tablets (1.25 to 2.5 mg) by mouth daily. Adjust dose based on INR results as directed. 90 tablet 3   ? [DISCONTINUED] diltiazem (CARDIZEM CD) 120 MG 24 hr capsule Take 1 capsule (120 mg total) by mouth daily. 90 capsule 2   ? furosemide (LASIX) 20 MG tablet Take 1 tablet (20 mg total) by mouth 2 (two) times a day. (Patient taking differently: Take 20 mg by mouth daily. ) 90 tablet 3     Facility-Administered Encounter Medications as of 2/8/2017   Medication Dose Route Frequency Provider Last Rate Last Dose   ? denosumab 60 mg (PROLIA 60 mg/ml)  60 mg Subcutaneous Q6 Months Surya Delaney MD   60 mg at 01/23/17 1251       Allergies  Atorvastatin; Bee sting kit; Codeine; Other allergy (see comments); Penicillins; and Simvastatin    Review of Systems    General: WNL  Eyes: WNL  Ears/Nose/Throat: WNL  Lungs: WNL  Heart: Shortness of Breath with  "activity, Irregular Heartbeat, Leg Swelling  Stomach: WNL  Bladder: WNL  Muscle/Joints: WNL  Skin: WNL  Nervous System: WNL  Mental Health: WNL     Blood: WNL    Objective:    Wt Readings from Last 5 Encounters:   02/08/17 140 lb (63.5 kg)   02/06/17 145 lb 9.6 oz (66 kg)   01/23/17 139 lb 4.8 oz (63.2 kg)   11/29/16 143 lb (64.9 kg)   10/03/16 141 lb (64 kg)      5' 2\" (1.575 m)  Body mass index is 25.61 kg/(m^2).  Visit Vitals   ? /64 (Patient Site: Left Arm, Patient Position: Sitting, Cuff Size: Adult Regular)   ? Pulse (!) 48   ? Resp 16   ? Ht 5' 2\" (1.575 m)   ? Wt 140 lb (63.5 kg)   ? BMI 25.61 kg/m2        Physical Exam:    General Appearance: Alert and not in distress   HEENT: No scleral icterus; the mucous membranes are pink and moist   Neck: No cervical bruits, adenopathy, or thyromegaly; jugular venous pressure is less than 5 cm    Chest: The chest is kyphotic and slightly asymmetric    Lungs: Respirations are unlabored; the lungs are clear to auscultation   Cardiovasular: Auscultation reveals a slow and regular rhythm with normal first and second heart sounds and no murmurs, rubs, or gallops   Extremities: No cyanosis, clubbing my: she is wearing compression stockings but appears to have 2+ right and 1+ left leg edema    Skin: No xanthelasma   Neurologic: Normal coordination; she uses a cane for ambulation    Psychiatric: Mood and affect are normal       Cardiac testing:    EKG: Atrial fibrillation with a competing junctional pacemaker with slow ventricular response   Incomplete right bundle branch block   Nonspecific ST and T wave abnormality   Abnormal ECG   When compared with ECG of 24-JUN-2015 14:18,   Vent. rate has decreased BY  60 BPM   Incomplete right bundle branch block has replaced Right bundle branch block   Per the personal review of Dr. Tunde Ma    INTERPRETATION DATE: 08/21/2015      TEST DATE 08/20/2015     INTERPRETATION: Predominant rhythm is atrial fibrillation, which is " present  throughout the monitoring period. Ventricular response ranged from 54 beats per  minute to 129 beats per minute, averaging 81 beats per minute over the monitoring  period. Average nocturnal ventricular response was 75 to 80. Average ventricular  response with activity was 85 to 110 beats per minute. No ventricular ectopy was  seen. There were no significant pauses. No symptoms were reported during the  monitoring period.     CONCLUSION: Persistent atrial fibrillation with controlled ventricular response.     JEAN MARIE RODRIGEZ MD       Results for orders placed during the hospital encounter of 07/09/14   NM Pharmacologic Stress Test [VFR411] 07/11/2014    Narrative INDICATION:  An 83-year-old female referred for evaluation of ischemia   with a  history of chest pain after eating.    STRESS:  Patient was given regadenoson and this was subjectively and   objectively  negative per Dr. Duarte's note.    IMAGES:  Rest images were obtained with the injection of 4.2 mCi of   thallium-201.   Stress images were obtained after regadenoson with the injection of 31.0   mCi of  technetium 99m sestamibi.  These images appear to be of good quality.    They show no  significant perfusion or reperfusion abnormalities.  Gated left   ventricular wall  motion is overall hyperdynamic with a calculated ejection fraction greater   than 80%.   There did not appear to be any obvious wall motion abnormalities.    SUMMARY:  1.  Subjectively and objectively negative regadenoson infusion per Dr. Duarte's  note.  2.  Regadenoson nuclear imaging does not suggest any ischemia or prior   scar.  3.  Hyperdynamic wall motion as mentioned above with ejection fraction   greater than  80%.  4.  No prior scan available for comparison.      MD christianne DELACRUZ 07/11/2014 14:59:56  T 07/11/2014 16:23:44  R 07/11/2014 16:23:44  [id number]      cc: GRANT DUMONT MD     Imaging:    No results found.    Lab Review:    Lab  Results   Component Value Date     02/06/2017    K 4.2 02/06/2017     (H) 02/06/2017    CO2 21 (L) 02/06/2017    BUN 27 02/06/2017    CREATININE 0.99 02/06/2017    CALCIUM 9.2 02/06/2017     Lab Results   Component Value Date    WBC 6.5 01/23/2017    WBC 7.2 04/20/2015    HGB 13.7 01/23/2017    HCT 40.3 01/23/2017    MCV 87 01/23/2017     01/23/2017     Lab Results   Component Value Date    CHOL 220 (H) 01/23/2017    TRIG 124 01/23/2017    HDL 49 (L) 01/23/2017     LDL CALCULATED (mg/dL)   Date Value   01/23/2017 146 (H)   08/05/2016 163 (H)   07/20/2015 123     BNP (pg/mL)   Date Value   02/06/2017 546 (H)   12/07/2015 235 (H)   10/12/2015 298 (H)           Much or all of the text in this note was generated through the use of the Dragon Dictate voice-to-text software. Errors in spelling or words which seem out of context are unintentional. Sound alike errors, in particular, may have escaped editing.

## 2021-06-28 ENCOUNTER — AMBULATORY - HEALTHEAST (OUTPATIENT)
Dept: INTERNAL MEDICINE | Facility: CLINIC | Age: 86
End: 2021-06-28

## 2021-06-28 ENCOUNTER — COMMUNICATION - HEALTHEAST (OUTPATIENT)
Dept: INTERNAL MEDICINE | Facility: CLINIC | Age: 86
End: 2021-06-28

## 2021-06-28 ENCOUNTER — OFFICE VISIT - HEALTHEAST (OUTPATIENT)
Dept: INTERNAL MEDICINE | Facility: CLINIC | Age: 86
End: 2021-06-28

## 2021-06-28 DIAGNOSIS — R30.0 DYSURIA: ICD-10-CM

## 2021-06-28 DIAGNOSIS — I48.20 CHRONIC ATRIAL FIBRILLATION (H): ICD-10-CM

## 2021-06-28 DIAGNOSIS — T50.2X5A DIURETIC-INDUCED HYPOKALEMIA: ICD-10-CM

## 2021-06-28 DIAGNOSIS — E87.6 DIURETIC-INDUCED HYPOKALEMIA: ICD-10-CM

## 2021-06-28 DIAGNOSIS — R60.9 EDEMA, UNSPECIFIED TYPE: ICD-10-CM

## 2021-06-28 DIAGNOSIS — M81.0 AGE-RELATED OSTEOPOROSIS WITHOUT CURRENT PATHOLOGICAL FRACTURE: ICD-10-CM

## 2021-06-28 DIAGNOSIS — K21.9 GASTROESOPHAGEAL REFLUX DISEASE: ICD-10-CM

## 2021-06-28 DIAGNOSIS — I10 ESSENTIAL HYPERTENSION: ICD-10-CM

## 2021-06-28 LAB
ALBUMIN UR-MCNC: NEGATIVE G/DL
APPEARANCE UR: CLEAR
BACTERIA #/AREA URNS HPF: ABNORMAL /[HPF]
BILIRUB UR QL STRIP: NEGATIVE
COLOR UR AUTO: YELLOW
GLUCOSE UR STRIP-MCNC: NEGATIVE MG/DL
HGB UR QL STRIP: ABNORMAL
KETONES UR STRIP-MCNC: NEGATIVE MG/DL
LEUKOCYTE ESTERASE UR QL STRIP: NEGATIVE
NITRATE UR QL: NEGATIVE
PH UR STRIP: 5.5 [PH] (ref 5–8)
RBC #/AREA URNS AUTO: ABNORMAL HPF
SP GR UR STRIP: 1.02 (ref 1–1.03)
SQUAMOUS #/AREA URNS AUTO: ABNORMAL LPF
UROBILINOGEN UR STRIP-ACNC: ABNORMAL
WBC #/AREA URNS AUTO: ABNORMAL HPF

## 2021-06-28 RX ORDER — PANTOPRAZOLE SODIUM 40 MG/1
40 TABLET, DELAYED RELEASE ORAL DAILY
Qty: 90 TABLET | Refills: 3 | Status: SHIPPED | OUTPATIENT
Start: 2021-06-28 | End: 2022-01-01

## 2021-06-28 RX ORDER — POTASSIUM CHLORIDE 750 MG/1
10 TABLET, EXTENDED RELEASE ORAL DAILY
Qty: 90 TABLET | Refills: 3 | Status: SHIPPED | OUTPATIENT
Start: 2021-06-28 | End: 2023-01-01

## 2021-06-28 RX ORDER — LOSARTAN POTASSIUM 25 MG/1
25 TABLET ORAL DAILY
Qty: 90 TABLET | Refills: 3 | Status: SHIPPED | OUTPATIENT
Start: 2021-06-28

## 2021-06-28 RX ORDER — METOPROLOL TARTRATE 25 MG/1
25 TABLET, FILM COATED ORAL 2 TIMES DAILY
Qty: 180 TABLET | Refills: 3 | Status: SHIPPED | OUTPATIENT
Start: 2021-06-28

## 2021-06-28 RX ORDER — FUROSEMIDE 20 MG
20 TABLET ORAL DAILY
Qty: 90 TABLET | Refills: 3 | Status: SHIPPED | OUTPATIENT
Start: 2021-06-28 | End: 2022-01-01

## 2021-07-03 NOTE — ADDENDUM NOTE
Addendum Note by Martin Rao LPN at 1/4/2021 12:28 PM     Author: Martin Rao LPN Service: -- Author Type: Licensed Nurse    Filed: 1/4/2021 12:28 PM Encounter Date: 12/30/2020 Status: Signed    : Martin Rao LPN (Licensed Nurse)    Addended by: MARTIN RAO on: 1/4/2021 12:28 PM        Modules accepted: Orders

## 2021-07-03 NOTE — ADDENDUM NOTE
Addendum Note by Surya Dumont MD at 1/4/2021  2:03 PM     Author: Surya Dumont MD Service: -- Author Type: Physician    Filed: 1/4/2021  2:03 PM Encounter Date: 12/30/2020 Status: Signed    : Surya Dumont MD (Physician)    Addended by: SURYA DUMONT on: 1/4/2021 02:03 PM        Modules accepted: Orders

## 2021-07-04 NOTE — ADDENDUM NOTE
Addendum Note by Jack Curtis RN at 3/3/2021  5:19 PM     Author: Jack Curtis RN Service: -- Author Type: Registered Nurse    Filed: 3/3/2021  5:19 PM Encounter Date: 2/9/2021 Status: Signed    : Jack Curtis RN (Registered Nurse)    Addended by: JACK CURTIS on: 3/3/2021 05:19 PM        Modules accepted: Orders

## 2021-07-06 VITALS
DIASTOLIC BLOOD PRESSURE: 60 MMHG | BODY MASS INDEX: 25.68 KG/M2 | OXYGEN SATURATION: 98 % | WEIGHT: 133.7 LBS | SYSTOLIC BLOOD PRESSURE: 138 MMHG | HEART RATE: 78 BPM

## 2021-07-07 NOTE — PROGRESS NOTES
ASSESSMENT:  1.  Osteoporosis:  Bone density study from November 2019 revealed a lowest T score of -3.7 in the wrist.  She was started on Prolia, but did not follow through with it due to Covid-19.  After discussion, she will start Prolia again.  Did complain of a sore arm from the last Prolia injection.  Later had aching in the leg.  I am not sure that this was truly from the shot.    2.  Right greater trochanteric bursitis:  Symptoms improved after steroid injection at last visit    3.  Atrial fibrillation:  She is on Eliquis as an anticoagulant and is on metoprolol.  She does have a single-chamber ICD    4.  Dysuria and increased urinary frequency:  UA/UC will be checked    PLAN:  1.  The following steps were completed to comply with the REMS program for Prolia:    Reviewed information in the Medication Guide and Patient Counseling Chart, including the serious risks of Prolia  and the symptoms of each risk.    2.  Prolia today.  Next injection 6 months    3.  Recheck bone density in 1 year    4.  Medications were updated and refilled.  Continue current doses.    5.  See in 3 months or as needed    Orders Placed This Encounter   Procedures     Urinalysis-UC if Indicated     Medications Discontinued During This Encounter   Medication Reason     losartan (COZAAR) 25 MG tablet Reorder     metoprolol tartrate (LOPRESSOR) 25 MG tablet Reorder     potassium chloride (K-DUR,KLOR-CON) 10 MEQ tablet Reorder     apixaban ANTICOAGULANT (ELIQUIS) 2.5 mg Tab tablet Reorder     furosemide (LASIX) 20 MG tablet Reorder     pantoprazole (PROTONIX) 40 MG tablet Reorder     diclofenac sodium (VOLTAREN) 1 % Gel Therapy completed     Administrations This Visit     denosumab 60 mg (PROLIA 60 mg/ml)     Admin Date  06/28/2021 Action  Given Dose  60 mg Route  Subcutaneous Administered By  ELLIS SANTANA              Return in about 3 months (around 9/28/2021) for Recheck.    ASSESSED PROBLEMS:  1. Age-related osteoporosis without  current pathological fracture     2. Chronic atrial fibrillation (H)  apixaban ANTICOAGULANT (ELIQUIS) 2.5 mg Tab tablet    metoprolol tartrate (LOPRESSOR) 25 MG tablet   3. Edema, unspecified type  furosemide (LASIX) 20 MG tablet   4. Essential hypertension  losartan (COZAAR) 25 MG tablet   5. Gastroesophageal reflux disease  pantoprazole (PROTONIX) 40 MG tablet   6. Diuretic-induced hypokalemia  potassium chloride (K-DUR,KLOR-CON) 10 MEQ tablet   7. Dysuria  Urinalysis- if Indicated       CHIEF COMPLAINT:  Chief Complaint   Patient presents with     Follow-up     2 mo and prolia       HISTORY OF PRESENT ILLNESS:  Helen is a 89 y.o. female seen for follow-up of osteoporosis and other concerns.  Her bone density study from November 2019 revealed a lowest T score of -3.7 in the wrist.  She was started on Prolia, but has missed it over the past year due to Covid-19 concerns.  She is interested in restarting.  Complaining of a sore arm after the last injection, and also noted transient leg pain.    She reports that hip pain improved after the steroid injection for greater trochanteric bursitis.  She later had pain behind the right knee, but states this has resolved.    She otherwise feels well.  She remains on Eliquis as an anticoagulant due to atrial fibrillation.  She is on metoprolol for rate control.  She does have an ICD.    She remains on Protonix for esophageal reflux.  Are under good control.  She is on furosemide for peripheral edema related to venous insufficiency and diastolic heart failure.  She has also on losartan and metoprolol for hypertension.    REVIEW OF SYSTEMS:    Comprehensive review of systems is otherwise negative.    PFSH:  .  Lives independently.  Has been frustrated since she not been driving since previous CVA.  She has made an excellent recovery from the CVA.    TOBACCO USE:  Social History     Tobacco Use   Smoking Status Never Smoker   Smokeless Tobacco Never Used        VITALS:  Vitals:    06/28/21 1252   BP: 138/60   Patient Site: Left Arm   Patient Position: Sitting   Cuff Size: Adult Regular   Pulse: 78   SpO2: 98%   Weight: 133 lb 11.2 oz (60.6 kg)     Wt Readings from Last 3 Encounters:   06/28/21 133 lb 11.2 oz (60.6 kg)   05/17/21 135 lb 1 oz (61.3 kg)   04/20/21 132 lb (59.9 kg)       PHYSICAL EXAM:  Constitutional:   Reveals an alert pleasant elderly woman who does not seem in acute distress.   Vitals: per nursing notes.  HEENT: Atraumatic  Eyes: Conjunctival hyperemia.  Oropharynx:   Mouth and throat clear, no thrush or exudate.  Neck:  Supple, no carotid bruits or adenopathy.  Back:  No spine or CVA pain.  Mild scoliosis  Thorax:  No bony deformities.  Defibrillator left upper chest  Lungs: Clear to A&P without rales or wheezes.  Respiratory effort normal.  Cardiac:   Regular rate and rhythm, normal S1, S2, no murmur or gallop  Abdomen:  Soft, active bowel sounds without bruits, mass, or tenderness.  Extremities:   No peripheral edema, pulses in the feet intact.  Moderate crepitance range of motion right knee  Skin:  No jaundice, peripheral cyanosis or lesions to suggest malignancy.  Neuro:  Alert and oriented.  No gross focal deficits.  Psychiatric:  Memory intact, mood appropriate.    DATA REVIEWED:  Additional History from Old Records Summarized (2): Chart reviewed  Decision to Obtain Records (1): None.   Radiology Tests Summarized or Ordered (1): DXA scan reviewed  Labs Reviewed or Ordered (1): None.  Medicine Test Summarized or Ordered (1): None.   Independent Review of EKG or X-RAY(2 each): None.    The visit lasted a total of 25 minutes face to face with the patient. Over 50% of the time was spent counseling and educating the patient about management of osteoporosis  .    Dragon dictation was used for this note. Speech recognition errors are a possibility.     MEDICATIONS:  Current Outpatient Medications   Medication Sig Dispense Refill     apixaban  ANTICOAGULANT (ELIQUIS) 2.5 mg Tab tablet Take 1 tablet (2.5 mg total) by mouth 2 (two) times a day. 180 tablet 3     biotin 5,000 mcg TbDL Take by mouth.       cholecalciferol, vitamin D3, 1,000 unit tablet Take 1,000 Units by mouth daily.       clindamycin (CLEOCIN) 300 MG capsule Prior to dental appointments  0     cyanocobalamin 1000 MCG tablet Take 1 tablet (1,000 mcg total) by mouth daily. 90 tablet 3     furosemide (LASIX) 20 MG tablet Take 1 tablet (20 mg total) by mouth daily. 90 tablet 3     losartan (COZAAR) 25 MG tablet Take 1 tablet (25 mg total) by mouth daily. 90 tablet 3     melatonin 3 mg Tab tablet Take 3 mg by mouth at bedtime as needed.       metoprolol tartrate (LOPRESSOR) 25 MG tablet Take 1 tablet (25 mg total) by mouth 2 (two) times a day. 180 tablet 3     multivitamin with minerals (THERA-M) 9 mg iron-400 mcg Tab tablet Take 1 tablet by mouth daily.       pantoprazole (PROTONIX) 40 MG tablet Take 1 tablet (40 mg total) by mouth daily. 90 tablet 3     potassium chloride (K-DUR,KLOR-CON) 10 MEQ tablet Take 1 tablet (10 mEq total) by mouth daily. 90 tablet 3     Current Facility-Administered Medications   Medication Dose Route Frequency Provider Last Rate Last Admin     [START ON 7/7/2021] denosumab 60 mg (PROLIA 60 mg/ml)  60 mg Subcutaneous Q6 Months Surya Delaney MD   60 mg at 06/28/21 5386

## 2021-07-07 NOTE — LETTER
Letter by Surya Delaney MD at      Author: Surya Delaney MD Service: -- Author Type: --    Filed:  Encounter Date: 6/28/2021 Status: (Other)         Helen Beckham  [address] [place] [address]             June 28, 2021         Dear Ms. Beckham,    Below are the results from your recent visit:    Resulted Orders   Urinalysis-UC if Indicated   Result Value Ref Range    Color, UA Yellow Colorless, Yellow, Straw, Light Yellow    Clarity, UA Clear Clear    Glucose, UA Negative Negative    Protein, UA Negative Negative    Bilirubin, UA Negative Negative    Urobilinogen, UA 0.2 E.U./dL 0.2 E.U./dL, 1.0 E.U./dL    pH, UA 5.5 5.0 - 8.0    Blood, UA Trace (!) Negative    Ketones, UA Negative Negative    Nitrite, UA Negative Negative    Leukocytes, UA Negative Negative    Specific Gravity, UA 1.020 1.005 - 1.030    RBC, UA 0-2 None Seen, 0-2 hpf    WBC, UA None Seen None Seen, 0-5 hpf    Bacteria, UA None Seen None Seen    Squam Epithel, UA None Seen None Seen, 0-5 lpf    Narrative    UC not indicated       Helen: Urine analysis appears normal with no evidence for infection.    Please call with questions or contact us using [email or web address].    Sincerely,        Electronically signed by Surya Delaney MD

## 2021-07-07 NOTE — PATIENT INSTRUCTIONS - HE
1.  Prolia injection today.    2.  Next injection in six months    3.  Repeat bone density next year    4.  See in three months or as needed.

## 2021-07-07 NOTE — PROGRESS NOTES
" Prolia Injection Phone Screen      Screening questions have been asked 2-3 days prior to administration visit for Prolia. If any questions are answered with \"Yes,\" this phone encounter were will routed to ordering provider for further evaluation.     1.  When was the last injection?  8/27/19    2.  Has insurance for this injection been verified?  Yes    3.  Did you experience any new onset achiness or rashes that lasted for over a month with your previous Prolia injection?   Yes . discussed with DR arteaga    4.  Do you have a fever over 101?F or a new deep cough that is unusual for you today? No    5.  Have you started any new medications in the last 6 months that you were told could affect your immune system? These may have been prescribed by oncologist, transplant, rheumatology, or dermatology.   No    6.  In the last 6 months have you have gastric bypass or parathyroid surgery?   No    7.  Do you plan dental work requiring drilling into the bone such as implants/extractions or oral surgery in the next 2-3 months?   No    8. Do you have new insurance since the last injection? no    Patient informed if symptoms discussed above present prior to their administration appointment, they are to notify clinic immediately.   The following steps were completed to comply with the REMS program for Prolia:  1. Ordering provider has previously reviewed information in the Medication Guide and Patient Counseling Chart, including the serious risks of Prolia  and the symptoms of each risk and have been advised to seek prompt medical attention if they have signs or symptoms of any of the serious risks.  2. Provided each patient a copy of the Medication Guide and Patient Brochure.  See MAR for administration details.   Indication: Prolia  (denosumab) is a prescription medicine used to treat osteoporosis in patients who:   Are at high risk for fracture, meaning patients who have had a fracture related to osteoporosis, or who have " multiple risk factors for fracture; Cannot use another osteoporosis medicine or other osteoporosis medicines did not work well.   The timeline for early/late injections would be 4 weeks early and any time after the 6 month kaitlin. If a patient receives their injection late, then the subsequent injection would be 6 months from the date that they actually received the injection    Have the screening questions been asked prior to this administration? Yes    Carmel Villarreal

## 2021-08-06 NOTE — TELEPHONE ENCOUNTER
Medication Question or Clarification  Who is calling: Patient  What medication are you calling about (include dose and sig)?:   furosemide (LASIX) 40 MG tablet  90 tablet  3  9/21/2020      Sig - Route: Take 1 tablet (40 mg total) by mouth daily. - Oral     Class: No Print         Who prescribed the medication?: Surya Delaney MD    What is your question/concern?: Pharmacy has not received prescription, please send.  Requested [facility]acy: [id number]  Okay to leave a detailed message?: Yes

## 2021-09-28 ENCOUNTER — OFFICE VISIT (OUTPATIENT)
Dept: INTERNAL MEDICINE | Facility: CLINIC | Age: 86
End: 2021-09-28
Payer: COMMERCIAL

## 2021-09-28 VITALS — DIASTOLIC BLOOD PRESSURE: 70 MMHG | SYSTOLIC BLOOD PRESSURE: 122 MMHG | HEART RATE: 89 BPM | OXYGEN SATURATION: 97 %

## 2021-09-28 DIAGNOSIS — M81.0 AGE-RELATED OSTEOPOROSIS WITHOUT CURRENT PATHOLOGICAL FRACTURE: Primary | ICD-10-CM

## 2021-09-28 DIAGNOSIS — E78.5 DYSLIPIDEMIA: ICD-10-CM

## 2021-09-28 DIAGNOSIS — I48.20 CHRONIC ATRIAL FIBRILLATION (H): ICD-10-CM

## 2021-09-28 DIAGNOSIS — I50.32 DIASTOLIC CHF, CHRONIC (H): ICD-10-CM

## 2021-09-28 DIAGNOSIS — I63.40 CEREBROVASCULAR ACCIDENT (CVA) DUE TO EMBOLISM OF CEREBRAL ARTERY (H): ICD-10-CM

## 2021-09-28 DIAGNOSIS — R53.82 CHRONIC FATIGUE: ICD-10-CM

## 2021-09-28 DIAGNOSIS — Z23 NEED FOR INFLUENZA VACCINATION: ICD-10-CM

## 2021-09-28 DIAGNOSIS — Z51.81 ENCOUNTER FOR THERAPEUTIC DRUG MONITORING: ICD-10-CM

## 2021-09-28 LAB
ALT SERPL W P-5'-P-CCNC: 15 U/L (ref 0–45)
ANION GAP SERPL CALCULATED.3IONS-SCNC: 12 MMOL/L (ref 5–18)
BUN SERPL-MCNC: 20 MG/DL (ref 8–28)
CALCIUM SERPL-MCNC: 9.3 MG/DL (ref 8.5–10.5)
CHLORIDE BLD-SCNC: 106 MMOL/L (ref 98–107)
CHOLEST SERPL-MCNC: 210 MG/DL
CO2 SERPL-SCNC: 23 MMOL/L (ref 22–31)
CREAT SERPL-MCNC: 0.86 MG/DL (ref 0.6–1.1)
ERYTHROCYTE [DISTWIDTH] IN BLOOD BY AUTOMATED COUNT: 13.8 % (ref 10–15)
FASTING STATUS PATIENT QL REPORTED: NO
GFR SERPL CREATININE-BSD FRML MDRD: 60 ML/MIN/1.73M2
GLUCOSE BLD-MCNC: 106 MG/DL (ref 70–125)
HCT VFR BLD AUTO: 45 % (ref 35–47)
HDLC SERPL-MCNC: 62 MG/DL
HGB BLD-MCNC: 14.2 G/DL (ref 11.7–15.7)
LDLC SERPL CALC-MCNC: 129 MG/DL
MCH RBC QN AUTO: 29.7 PG (ref 26.5–33)
MCHC RBC AUTO-ENTMCNC: 31.6 G/DL (ref 31.5–36.5)
MCV RBC AUTO: 94 FL (ref 78–100)
PLATELET # BLD AUTO: 251 10E3/UL (ref 150–450)
POTASSIUM BLD-SCNC: 4.3 MMOL/L (ref 3.5–5)
RBC # BLD AUTO: 4.78 10E6/UL (ref 3.8–5.2)
SODIUM SERPL-SCNC: 141 MMOL/L (ref 136–145)
TRIGL SERPL-MCNC: 97 MG/DL
TSH SERPL DL<=0.005 MIU/L-ACNC: 2.04 UIU/ML (ref 0.3–5)
WBC # BLD AUTO: 8.6 10E3/UL (ref 4–11)

## 2021-09-28 PROCEDURE — 80061 LIPID PANEL: CPT | Performed by: INTERNAL MEDICINE

## 2021-09-28 PROCEDURE — 36415 COLL VENOUS BLD VENIPUNCTURE: CPT | Performed by: INTERNAL MEDICINE

## 2021-09-28 PROCEDURE — 85027 COMPLETE CBC AUTOMATED: CPT | Performed by: INTERNAL MEDICINE

## 2021-09-28 PROCEDURE — 90471 IMMUNIZATION ADMIN: CPT | Performed by: INTERNAL MEDICINE

## 2021-09-28 PROCEDURE — 80048 BASIC METABOLIC PNL TOTAL CA: CPT | Performed by: INTERNAL MEDICINE

## 2021-09-28 PROCEDURE — 90686 IIV4 VACC NO PRSV 0.5 ML IM: CPT | Performed by: INTERNAL MEDICINE

## 2021-09-28 PROCEDURE — 99214 OFFICE O/P EST MOD 30 MIN: CPT | Mod: 25 | Performed by: INTERNAL MEDICINE

## 2021-09-28 PROCEDURE — 84460 ALANINE AMINO (ALT) (SGPT): CPT | Performed by: INTERNAL MEDICINE

## 2021-09-28 PROCEDURE — 84443 ASSAY THYROID STIM HORMONE: CPT | Performed by: INTERNAL MEDICINE

## 2021-09-28 NOTE — PATIENT INSTRUCTIONS
1.  Flu shot today.    2.  I will notify you of test results    3.  Third covid vaccine in about one month    4.  See in three months or as needed

## 2021-09-28 NOTE — LETTER
September 30, 2021      Helen Beckham  [address] [place] [address]        Dear Helen:    We are writing to inform you of your test results.    Your total cholesterol is mildly elevated to 210 with an LDL fraction of 129.  Dietary management is appropriate.  Thyroid test returned in the normal range.  Other labs including your potassium, blood sugar, hemoglobin, kidney tests and liver enzymes are normal.  It was nice to see you.    Resulted Orders   TSH WITH FREE T4 REFLEX   Result Value Ref Range    TSH 2.04 0.30 - 5.00 uIU/mL   ALT   Result Value Ref Range    ALT 15 0 - 45 U/L   Lipid panel reflex to direct LDL Fasting   Result Value Ref Range    Cholesterol 210 (H) <=199 mg/dL    Triglycerides 97 <=149 mg/dL    Direct Measure HDL 62 >=50 mg/dL      Comment:      HDL Cholesterol Reference Range:     0-2 years:   No reference ranges established for patients under 2 years old  at [facility] for lipid analytes.    2-8 years:  Greater than 45 mg/dL     18 years and older:   Female: Greater than or equal to 50 mg/dL   Male:   Greater than or equal to 40 mg/dL    LDL Cholesterol Calculated 129 <=129 mg/dL    Patient Fasting > 8hrs? No    CBC with Platelets     Result Value Ref Range    WBC Count 8.6 4.0 - 11.0 10e3/uL    RBC Count 4.78 3.80 - 5.20 10e6/uL    Hemoglobin 14.2 11.7 - 15.7 g/dL    Hematocrit 45.0 35.0 - 47.0 %    MCV 94 78 - 100 fL    MCH 29.7 26.5 - 33.0 pg    MCHC 31.6 31.5 - 36.5 g/dL    RDW 13.8 10.0 - 15.0 %    Platelet Count 251 150 - 450 10e3/uL   BASIC METABOLIC PANEL   Result Value Ref Range    Sodium 141 136 - 145 mmol/L    Potassium 4.3 3.5 - 5.0 mmol/L    Chloride 106 98 - 107 mmol/L    Carbon Dioxide (CO2) 23 22 - 31 mmol/L    Anion Gap 12 5 - 18 mmol/L    Urea Nitrogen 20 8 - 28 mg/dL    Creatinine 0.86 0.60 - 1.10 mg/dL    Calcium 9.3 8.5 - 10.5 mg/dL    Glucose 106 70 - 125 mg/dL    GFR Estimate 60 (L) >60 mL/min/1.73m2      Comment:      As of July 11, 2021, eGFR is  calculated by the CKD-EPI creatinine equation, without race adjustment. eGFR can be influenced by muscle mass, exercise, and diet. The reported eGFR is an estimation only and is only applicable if the renal function is stable.       If you have any questions or concerns, please call the clinic at the number listed above.       Sincerely,      Surya Delaney MD

## 2021-09-29 NOTE — PROGRESS NOTES
ASSESSMENT:  1. Age-related osteoporosis without current pathological fracture  [facility] most recent bone density study in November 2019 revealed a lowest T score of -3.7 in the wrist.  She is on Prolia with last injection on 6/28/2021.  I would advise a 2-year follow-up bone density study.  She should continue Prolia  - DX Hip/Pelvis/Spine; Future    2. Need for influenza vaccination  Flu shot is given today.  She prefers the regular flu shot rather than 8/65 version due to previous reaction to the over 65 vaccine  - INFLUENZA QUAD, PF (RIV4) (FLUBLOK)    3. Chronic fatigue  Thyroid function will be checked  - TSH WITH FREE T4 REFLEX; Future  - TSH WITH FREE T4 REFLEX    4. Dyslipidemia  Lipids will be checked  - Lipid panel reflex to direct LDL Fasting; Future  - Lipid panel reflex to direct LDL Fasting    5. Encounter for therapeutic drug monitoring  Monitoring labs are checked as outlined  - ALT; Future  - CBC with Platelets  ; Future  - BASIC METABOLIC PANEL; Future  - ALT  - CBC with Platelets    - BASIC METABOLIC PANEL    6. Cerebrovascular accident (CVA) due to embolism of cerebral artery (H)  She has made an excellent recovery and feels back to her usual baseline.  She is independent in her own home.  She uses a cane outside the home but not indoors.  She still drives a car    7. Diastolic CHF, chronic (H)  On furosemide 20 mg daily.  No current issues with peripheral edema or orthopnea    8. Chronic atrial fibrillation (H)  On Eliquis as an anticoagulant with metoprolol for rate control    PLAN:  Patient Instructions   1.  Flu shot today.    2.  I will notify you of test results    3.  Third covid vaccine in about one month    4.  See in three months or as needed    5.  Recheck bone density after 11/14/2021  Orders Placed This Encounter   Procedures     REVIEW OF HEALTH MAINTENANCE PROTOCOL ORDERS     DX Hip/Pelvis/Spine     INFLUENZA QUAD, PF (RIV4) (FLUBLOK)     PA FLU VAC PRESRV FREE QUAD SPLIT VIR IM   MONTHS IM     TSH WITH FREE T4 REFLEX     ALT     Lipid panel reflex to direct LDL Fasting     CBC with Platelets       BASIC METABOLIC PANEL     There are no discontinued medications.    Return in about 3 months (around 12/28/2021) for Follow up.    ASSESSED PROBLEMS:    See above      CHIEF COMPLAINT:  Follow-up after embolic CVA, chronic atrial fibrillation, and other concerns    HISTORY OF PRESENT ILLNESS:  Helen is a 89 year old female seen for follow-up after embolic CVA, persistent atrial fibrillation, and other concerns.  Helen has been  for 17 years.  She lives independently in her own home.  She uses a cane outside the home but does not feel she needs it indoors.  She still drives a car.  She plans to renew her [employer] tickets this year.      She has a history of an embolic CVA.  She has made an excellent recovery and does not notice any ongoing deficits.  She has switched from warfarin to Eliquis as an anticoagulant.    She has a history of congestive heart failure with preserved ejection fraction.  She is on furosemide 20 mg daily.  She denies any issues with peripheral edema or orthopnea.    She remains on metoprolol for rate control with atrial fibrillation.  As noted above she is now on Eliquis.  She takes low-dose losartan along with metoprolol for hypertension.  Blood pressure has been excellent.    REVIEW OF SYSTEMS:    Comprehensive review of systems is otherwise negative.    PFSH:  .  Lives independently in her own home    TOBACCO USE:  History   Smoking Status     Never Smoker   Smokeless Tobacco     Never Used       VITALS:  Vitals:    09/28/21 1248   BP: 122/70   BP Location: Left arm   Patient Position: Sitting   Cuff Size: Adult Regular   Pulse: 89   SpO2: 97%     Wt Readings from Last 3 Encounters:   06/28/21 60.6 kg (133 lb 11.2 oz)   05/17/21 61.3 kg (135 lb 1 oz)   04/20/21 59.9 kg (132 lb)       PHYSICAL EXAM:  Constitutional:   Reveals an alert pleasant elderly  woman.  She ambulates easily with a single prong cane.    Vitals: per nursing notes.  HEENT: Atraumatic  Eyes: No conjunctival hyperemia  Oropharynx:   Mouth and throat clear, no thrush or exudate.  Neck:  Supple, no carotid bruits or adenopathy.  Back:   minor kyphoscoliosis  Thorax:  No bony deformities.  Lungs: Clear to A&P without rales or wheezes.  Respiratory effort normal.  Cardiac:   Irregularly irregular rate and rhythm, normal S1, S2, no murmur or gallop..  Abdomen:  Soft, active bowel sounds without bruits, mass, or tenderness.  Extremities:   No peripheral edema, pulses in the feet intact.  Joint changes consistent with erosive osteoarthritis  Skin:  No jaundice, peripheral cyanosis or lesions to suggest malignancy.  Neuro:  Alert and oriented.   No gross focal deficits.  Psychiatric:  Memory intact, mood appropriate.    DATA REVIEWED:  Additional History from Old Records Summarized (2): None.  Decision to Obtain Records (1): None.   Radiology Tests Summarized or Ordered (1): DEXA scan reviewed and ordered  Labs Reviewed or Ordered (1):  labs reviewed and ordered  Medicine Test Summarized or Ordered (1): None.   Independent Review of EKG or X-RAY(2 each): None.    The visit lasted a total of 30 minutes face to face with the patient. Over 50% of the time was spent counseling and educating the patient about management of persistent atrial fibrillation, heart failure with preserved ejection fraction, and follow-up of osteoporosis.    Dragon dictation was used for this note. Speech recognition errors are a possibility.     MEDICATIONS:  Current Outpatient Medications   Medication Sig Dispense Refill     apixaban ANTICOAGULANT (ELIQUIS) 2.5 mg Tab tablet [APIXABAN ANTICOAGULANT (ELIQUIS) 2.5 MG TAB TABLET] Take 1 tablet (2.5 mg total) by mouth 2 (two) times a day. 180 tablet 3     apixaban ANTICOAGULANT (ELIQUIS) 2.5 mg Tab tablet [APIXABAN ANTICOAGULANT (ELIQUIS) 2.5 MG TAB TABLET] Take 1 tablet (2.5 mg  total) by mouth 2 (two) times a day. 180 tablet 3     biotin 5,000 mcg TbDL [BIOTIN 5,000 MCG TBDL] Take by mouth.       cholecalciferol, vitamin D3, 1,000 unit tablet [CHOLECALCIFEROL, VITAMIN D3, 1,000 UNIT TABLET] Take 1,000 Units by mouth daily.       clindamycin (CLEOCIN) 300 MG capsule [CLINDAMYCIN (CLEOCIN) 300 MG CAPSULE] Prior to dental appointments  0     cyanocobalamin 1000 MCG tablet [CYANOCOBALAMIN 1000 MCG TABLET] Take 1 tablet (1,000 mcg total) by mouth daily. 90 tablet 3     diclofenac sodium (VOLTAREN) 1 % Gel [DICLOFENAC SODIUM (VOLTAREN) 1 % GEL] Apply topically 4 (four) times a day.       furosemide (LASIX) 20 MG tablet [FUROSEMIDE (LASIX) 20 MG TABLET] Take 1 tablet (20 mg total) by mouth daily. 90 tablet 3     furosemide (LASIX) 20 MG tablet [FUROSEMIDE (LASIX) 20 MG TABLET] Take 1 tablet (20 mg total) by mouth daily. 90 tablet 3     losartan (COZAAR) 25 MG tablet [LOSARTAN (COZAAR) 25 MG TABLET] Take 1 tablet (25 mg total) by mouth daily. 90 tablet 3     losartan (COZAAR) 25 MG tablet [LOSARTAN (COZAAR) 25 MG TABLET] Take 1 tablet (25 mg total) by mouth daily. 90 tablet 3     melatonin 3 mg Tab tablet [MELATONIN 3 MG TAB TABLET] Take 3 mg by mouth at bedtime as needed.       metoprolol tartrate (LOPRESSOR) 25 MG tablet [METOPROLOL TARTRATE (LOPRESSOR) 25 MG TABLET] Take 1 tablet (25 mg total) by mouth 2 (two) times a day. 180 tablet 3     metoprolol tartrate (LOPRESSOR) 25 MG tablet [METOPROLOL TARTRATE (LOPRESSOR) 25 MG TABLET] Take 1 tablet by mouth 2 times a day. 180 tablet 3     multivitamin with minerals (THERA-M) 9 mg iron-400 mcg Tab tablet [MULTIVITAMIN WITH MINERALS (THERA-M) 9 MG IRON-400 MCG TAB TABLET] Take 1 tablet by mouth daily.       pantoprazole (PROTONIX) 40 MG tablet [PANTOPRAZOLE (PROTONIX) 40 MG TABLET] Take 1 tablet (40 mg total) by mouth daily. 90 tablet 3     pantoprazole (PROTONIX) 40 MG tablet [PANTOPRAZOLE (PROTONIX) 40 MG TABLET] Take 1 tablet (40 mg total) by mouth  daily. 90 tablet 3     potassium chloride (K-DUR,KLOR-CON) 10 MEQ tablet [POTASSIUM CHLORIDE (K-DUR,KLOR-CON) 10 MEQ TABLET] Take 1 tablet (10 mEq total) by mouth daily. 90 tablet 3     potassium chloride (K-DUR,KLOR-CON) 10 MEQ tablet [POTASSIUM CHLORIDE (K-DUR,KLOR-CON) 10 MEQ TABLET] Take 1 tablet (10 mEq total) by mouth daily. 90 tablet 3

## 2021-12-13 ENCOUNTER — NURSE TRIAGE (OUTPATIENT)
Dept: NURSING | Facility: CLINIC | Age: 86
End: 2021-12-13
Payer: COMMERCIAL

## 2021-12-13 NOTE — TELEPHONE ENCOUNTER
Triage call:     Patient calling with severe right leg pain.   She is using two canes to walk   Yesterday she noted bruising on her right inner thigh.  Today, the bruise as big as her hand.     She is taking Elequis.     Per protocol, patient should be seen within 4 hours or PCP triage. No appointments available in clinic. She does not want to go to urgent care.   Will route to PCP to advise. Patient can be reached at [phone].     She has an appointment on 12/28.     Marya Cortes RN   12/13/21 10:19 AM  [facility] Nurse Advisor    Additional Information    Negative: Shock suspected (e.g., cold/pale/clammy skin, too weak to stand, low BP, rapid pulse)    Negative: Sounds like a life-threatening emergency to the triager    Negative: Dizziness or lightheadedness    Negative: Unexplained bleeding from another site (e.g., gums, nose, urine) as well    Negative: [1] Bruise on head/face, chest, or abdomen AND [2]  taking Coumadin (warfarin) or other strong blood thinner, or known bleeding disorder (e.g., thrombocytopenia)    Negative: Patient sounds very sick or weak to the triager    [1] SEVERE pain AND [2] not improved 2 hours after pain medicine/ice packs    Negative: [1] Raised bruise AND [2] size > 2 inches (5 cm) AND [3] getting bigger    Negative: [1] Not caused by an injury AND [2] 5 or more bruises now    Negative: Chest pain    Negative: Difficulty breathing    Negative: Entire foot is cool or blue in comparison to other side    Negative: Unable to walk    Protocols used: BRUISES-A-AH, LEG PAIN-A-OH    COVID 19 Nurse Triage Plan/Patient Instructions    Please be aware that novel coronavirus (COVID-19) may be circulating in the community. If you develop symptoms such as fever, cough, or SOB or if you have concerns about the presence of another infection including coronavirus (COVID-19), please contact your health care provider or visit [email or web address].      Disposition/Instructions    In-Person Visit with provider recommended. Reference Visit Selection Guide.    Thank you for taking steps to prevent the spread of this virus.  o Limit your contact with others.  o Wear a simple mask to cover your cough.  o Wash your hands well and often.    Resources    [facility]: About COVID-19: [email or web address]    CDC: What to Do If You're Sick: www.cdc.gov/coronavirus/2019-ncov/about/steps-when-sick.html    CDC: Ending Home Isolation: www.cdc.gov/coronavirus/2019-ncov/hcp/disposition-in-home-patients.html     CDC: Caring for Someone: www.cdc.gov/coronavirus/2019-ncov/if-you-are-sick/care-for-someone.html     [facility]: Interim Guidance for Hospital Discharge to Home: [email or web address]/hcp/[email or web address]    [facility] clinical trials (COVID-19 research studies): [email or web address]-clinical-trials     Below are the COVID-19 hotlines at the [place] [facility] ([facility]). Interpreters are available.   o For health questions: Call 558-392-5421 or 1-215.601.8347 (7 a.m. to 7 p.m.)  o For questions about schools and childcare: Call 630-793-0632 or 1-266.837.2904 (7 a.m. to 7 p.m.)

## 2021-12-14 ENCOUNTER — OFFICE VISIT (OUTPATIENT)
Dept: INTERNAL MEDICINE | Facility: CLINIC | Age: 86
End: 2021-12-14
Payer: COMMERCIAL

## 2021-12-14 VITALS
HEIGHT: 61 IN | HEART RATE: 74 BPM | BODY MASS INDEX: 25.68 KG/M2 | OXYGEN SATURATION: 95 % | SYSTOLIC BLOOD PRESSURE: 130 MMHG | DIASTOLIC BLOOD PRESSURE: 64 MMHG

## 2021-12-14 DIAGNOSIS — Z79.01 CHRONIC ANTICOAGULATION: ICD-10-CM

## 2021-12-14 DIAGNOSIS — T14.8XXA HEMATOMA OF SKIN: Primary | ICD-10-CM

## 2021-12-14 DIAGNOSIS — G89.11 ACUTE PAIN DUE TO INJURY: ICD-10-CM

## 2021-12-14 PROCEDURE — 99213 OFFICE O/P EST LOW 20 MIN: CPT | Performed by: INTERNAL MEDICINE

## 2021-12-14 RX ORDER — TRAMADOL HYDROCHLORIDE 50 MG/1
50 TABLET ORAL EVERY 6 HOURS PRN
Qty: 15 TABLET | Refills: 0 | Status: SHIPPED | OUTPATIENT
Start: 2021-12-14 | End: 2021-12-18

## 2021-12-14 NOTE — PROGRESS NOTES
Answers for HPI/ROS submitted by the patient on 12/14/2021  How many servings of fruits and vegetables do you eat daily?: 0-1  On average, how many sweetened beverages do you drink each day (Examples: soda, juice, sweet tea, etc.  Do NOT count diet or artificially sweetened beverages)?: 0  How many minutes a day do you exercise enough to make your heart beat faster?: 9 or less  How many days a week do you exercise enough to make your heart beat faster?: 3 or less  How many days per week do you miss taking your medication?: 0    ASSESSMENT:  1. Hematoma of skin  Helen has developed a large hematoma of the right lower thigh.  There is no recent trauma.  She is on chronic anticoagulation with Eliquis which could be a contributing factor.  Conservative management was discussed  - traMADol (ULTRAM) 50 MG tablet; Take 1 tablet (50 mg) by mouth every 6 hours as needed for severe pain  Dispense: 15 tablet; Refill: 0    2. Acute pain due to injury  She has been using Tylenol for pain but requests a refill of tramadol.  She has tolerated it in the past  - traMADol (ULTRAM) 50 MG tablet; Take 1 tablet (50 mg) by mouth every 6 hours as needed for severe pain  Dispense: 15 tablet; Refill: 0    3. Chronic anticoagulation  She is on Eliquis 2.5 mg twice daily due to chronic atrial fibrillation.  Eliquis will be held for 5 days    PLAN:  Patient Instructions   1.  Stop Eliquis for five days, then restart.      2.  Six inch ace wrap for compression.    3.  Cold packs to area.  May begin heat in 3 to 4 days.    4.  Tramadol 50 mg one half to one tab four times daily as needed.    5.  Continue tylenol for pain in addition to Tramadol.    6.  Physical therapy if not improving in one week.      There are no discontinued medications.    Return if symptoms worsen or fail to improve.    ASSESSED PROBLEMS:  See above      CHIEF COMPLAINT:  Large hematoma right thigh    HISTORY OF PRESENT ILLNESS:  Helen is a 89 year old female who presents  "complaining of a painful hematoma on the right thigh.  She first noted this about 4 days ago.  It became more severe over the subsequent 2 days.  She does not recall any falls or trauma.  She is chronically on Eliquis as an anticoagulant.    Otherwise is felt well since last clinic visit.  She has been using 2 canes for ambulation    REVIEW OF SYSTEMS:    Comprehensive review of systems is otherwise negative.    PFSH:  .  Lives independently.  Still has [employer] season tickets, but has not been to a game lately    TOBACCO USE:  History   Smoking Status     Never Smoker   Smokeless Tobacco     Never Used       VITALS:  Vitals:    12/14/21 0926   BP: 130/64   BP Location: Left arm   Patient Position: Sitting   Cuff Size: Adult Small   Pulse: 74   SpO2: 95%   Height: 1.537 m (5' 0.5\")     Wt Readings from Last 3 Encounters:   06/28/21 60.6 kg (133 lb 11.2 oz)   05/17/21 61.3 kg (135 lb 1 oz)   04/20/21 59.9 kg (132 lb)       PHYSICAL EXAM:  Constitutional:   Reveals an alert pleasant talkative woman. She came in the room in a wheelchair.  Vitals: per nursing notes.  HEENT: Atraumatic.    Eyes: No conjunctival hyperemia..  Extremities: Large ecchymoses with swelling and tenderness lower lateral thigh on the right just above the knee. Smaller ecchymoses medially. No joint effusion of the knee noted. 1+ edema below the knee  Skin: No petechiae. No ecchymoses noted in other areas  Neuro:  Alert and oriented.   No gross focal deficits.  Psychiatric:  Memory intact, mood appropriate.    DATA REVIEWED:  Additional History from Old Records Summarized (2): None.  Decision to Obtain Records (1): None.   Radiology Tests Summarized or Ordered (1): None.  Labs Reviewed or Ordered (1): None.  Medicine Test Summarized or Ordered (1): None.   Independent Review of EKG or X-RAY(2 each): None.    The visit lasted a total of 18 minutes face to face with the patient. Over 50% of the time was spent counseling and educating the patient " about management of large ecchymoses and possible muscle injury.    Dragon dictation was used for this note. Speech recognition errors are a possibility.     MEDICATIONS:  Current Outpatient Medications   Medication Sig Dispense Refill     apixaban ANTICOAGULANT (ELIQUIS) 2.5 mg Tab tablet [APIXABAN ANTICOAGULANT (ELIQUIS) 2.5 MG TAB TABLET] Take 1 tablet (2.5 mg total) by mouth 2 (two) times a day. 180 tablet 3     biotin 5,000 mcg TbDL [BIOTIN 5,000 MCG TBDL] Take by mouth.       cholecalciferol, vitamin D3, 1,000 unit tablet [CHOLECALCIFEROL, VITAMIN D3, 1,000 UNIT TABLET] Take 1,000 Units by mouth daily.       clindamycin (CLEOCIN) 300 MG capsule [CLINDAMYCIN (CLEOCIN) 300 MG CAPSULE] Prior to dental appointments  0     cyanocobalamin 1000 MCG tablet [CYANOCOBALAMIN 1000 MCG TABLET] Take 1 tablet (1,000 mcg total) by mouth daily. 90 tablet 3     diclofenac sodium (VOLTAREN) 1 % Gel [DICLOFENAC SODIUM (VOLTAREN) 1 % GEL] Apply topically 4 (four) times a day.       furosemide (LASIX) 20 MG tablet [FUROSEMIDE (LASIX) 20 MG TABLET] Take 1 tablet (20 mg total) by mouth daily. 90 tablet 3     losartan (COZAAR) 25 MG tablet [LOSARTAN (COZAAR) 25 MG TABLET] Take 1 tablet (25 mg total) by mouth daily. 90 tablet 3     melatonin 3 mg Tab tablet [MELATONIN 3 MG TAB TABLET] Take 3 mg by mouth at bedtime as needed.       metoprolol tartrate (LOPRESSOR) 25 MG tablet [METOPROLOL TARTRATE (LOPRESSOR) 25 MG TABLET] Take 1 tablet (25 mg total) by mouth 2 (two) times a day. 180 tablet 3     multivitamin with minerals (THERA-M) 9 mg iron-400 mcg Tab tablet [MULTIVITAMIN WITH MINERALS (THERA-M) 9 MG IRON-400 MCG TAB TABLET] Take 1 tablet by mouth daily.       pantoprazole (PROTONIX) 40 MG tablet [PANTOPRAZOLE (PROTONIX) 40 MG TABLET] Take 1 tablet (40 mg total) by mouth daily. 90 tablet 3     potassium chloride (K-DUR,KLOR-CON) 10 MEQ tablet [POTASSIUM CHLORIDE (K-DUR,KLOR-CON) 10 MEQ TABLET] Take 1 tablet (10 mEq total) by mouth  daily. 90 tablet 3     traMADol (ULTRAM) 50 MG tablet Take 1 tablet (50 mg) by mouth every 6 hours as needed for severe pain 15 tablet 0

## 2021-12-14 NOTE — PATIENT INSTRUCTIONS
1.  Stop Eliquis for five days, then restart.      2.  Six inch ace wrap for compression.    3.  Cold packs to area.  May begin heat in 3 to 4 days.    4.  Tramadol 50 mg one half to one tab four times daily as needed.    5.  Continue tylenol for pain in addition to Tramadol.    6.  Physical therapy if not improving in one week.

## 2021-12-28 ENCOUNTER — OFFICE VISIT (OUTPATIENT)
Dept: INTERNAL MEDICINE | Facility: CLINIC | Age: 86
End: 2021-12-28
Payer: COMMERCIAL

## 2021-12-28 ENCOUNTER — ANCILLARY PROCEDURE (OUTPATIENT)
Dept: GENERAL RADIOLOGY | Facility: CLINIC | Age: 86
End: 2021-12-28
Attending: INTERNAL MEDICINE
Payer: COMMERCIAL

## 2021-12-28 VITALS
SYSTOLIC BLOOD PRESSURE: 143 MMHG | DIASTOLIC BLOOD PRESSURE: 65 MMHG | WEIGHT: 133 LBS | BODY MASS INDEX: 25.55 KG/M2 | HEART RATE: 79 BPM | OXYGEN SATURATION: 93 %

## 2021-12-28 DIAGNOSIS — M25.551 HIP PAIN, RIGHT: ICD-10-CM

## 2021-12-28 DIAGNOSIS — N18.31 STAGE 3A CHRONIC KIDNEY DISEASE (H): Primary | ICD-10-CM

## 2021-12-28 DIAGNOSIS — I48.20 CHRONIC ATRIAL FIBRILLATION (H): ICD-10-CM

## 2021-12-28 DIAGNOSIS — M81.0 SENILE OSTEOPOROSIS: ICD-10-CM

## 2021-12-28 PROCEDURE — 99214 OFFICE O/P EST MOD 30 MIN: CPT | Performed by: INTERNAL MEDICINE

## 2021-12-28 PROCEDURE — 73502 X-RAY EXAM HIP UNI 2-3 VIEWS: CPT | Mod: TC | Performed by: RADIOLOGY

## 2021-12-28 RX ORDER — TRAMADOL HYDROCHLORIDE 50 MG/1
50 TABLET ORAL EVERY 6 HOURS PRN
Qty: 15 TABLET | Refills: 0 | Status: SHIPPED | OUTPATIENT
Start: 2021-12-28 | End: 2021-12-31

## 2021-12-28 NOTE — PROGRESS NOTES
ASSESSMENT:  1. Hip pain, right  Helen was last seen on 12/14 after developing a large hematoma on the right lower thigh.  The hematoma is resolving.  However, she now notes quite severe pain in the right hip area to the proximal thigh.  She has not had any falls or obvious injury to the area.  She is on Prolia for osteoporosis.  X-ray shows moderate DJD with no fractures.  I suspect that her original hematoma was due to a muscle tear with probable insertion in the hip area.  Physical therapy approach would be advised.  Helen does not drive and would be regarded as homebound.  Home based physical therapy is recommended.  It is felt that the muscle tear is the primary cause of her pain, even though she has significant degenerative arthritis in the hip.  - XR Hip Right 2-3 Views; Future  - Home Care Nursing Referral  - traMADol (ULTRAM) 50 MG tablet; Take 1 tablet (50 mg) by mouth every 6 hours as needed for severe pain  Dispense: 15 tablet; Refill: 0    2. Chronic kidney disease, stage 3 (H)  Renal function was good for age when last checked    3. Chronic atrial fibrillation (H)  Eliquis was held for 5 days due to the large ecchymoses noted on 12/14.  She is now back on this with no further bleeding problems.  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 Senile osteoporosis  On Prolia    PLAN:  Patient Instructions   1.  Schedule home PT for hip pain.    2.  Refill Tramadol.  Take tylenol with this.    3.  Orthopedic consult if symptoms do not improve with therapy    4.  See in three months or as needed.    Orders Placed This Encounter   Procedures     XR Hip Right 2-3 Views     Home Care Nursing Referral     There are no discontinued medications.    Return in about 3 months (around 3/28/2022).    ASSESSED PROBLEMS:  See above      CHIEF COMPLAINT:  Right hip pain    HISTORY OF PRESENT ILLNESS:  Helen is a 89 year old female seen for evaluation of right hip pain.  She last was seen on 12/14 with a large painful  ecchymoses in the right lower thigh.  She is on Eliquis.  There was no trauma at that time.  Discomfort was felt to be related either to a muscle tear or to a spontaneous hemorrhage related to anticoagulant therapy.  She was taken off of the Eliquis and advised to use compressive wraps to the area and cold packs followed later by heat.  She reports that the ecchymoses now has largely resolved.  Pain in the lower thigh has improved, but she has noted increased pain in the hip and proximal thigh.  She is able to get in to the bathroom and transfer, but has had increasing difficulties managing at home due to discomfort.  She was given tramadol for pain at last visit which was of benefit.  She also has been using Tylenol.    He is back on Eliquis after 5 days off of it.  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    PFSH:  .  Lives independently.  Has season wild tickets, but has not gone this year due to health issues and Covid.    TOBACCO USE:  History   Smoking Status     Never Smoker   Smokeless Tobacco     Never Used       VITALS:  Vitals:    12/28/21 1348   BP: (!) 143/65   BP Location: Left arm   Patient Position: Sitting   Cuff Size: Adult Large   Pulse: 79   SpO2: 93%   Weight: 60.3 kg (133 lb)     Wt Readings from Last 3 Encounters:   12/28/21 60.3 kg (133 lb)   06/28/21 60.6 kg (133 lb 11.2 oz)   05/17/21 61.3 kg (135 lb 1 oz)       PHYSICAL EXAM:  Constitutional:   Reveals an alert pleasant elderly woman seen in a wheelchair.  She does appear uncomfortable with change in position.  Vitals: per nursing notes.  HEENT: Atraumatic  Eyes: No conjunctival hyperemia  Neck:  Supple, no carotid bruits or adenopathy.  Back:  No spine or CVA pain.  No tenderness over SI joints.  No visible ecchymoses  Thorax:  No bony deformities..  Extremities:   Resolving ecchymoses right lower lateral thigh.   Moderate tenderness to touch right greater trochanter area especially posterior.  No visible ecchymoses or obvious bony deformity in the area.  No tenderness in the groin.  Skin:  No jaundice, peripheral cyanosis or lesions to suggest malignancy.  Neuro:  Alert and oriented. .  No gross focal deficits.  Psychiatric:  Memory intact, mood appropriate.    DATA REVIEWED:  Additional History from Old Records Summarized (2): None.  Decision to Obtain Records (1): None.   Radiology Tests Summarized or Ordered (1): None.  Labs Reviewed or Ordered (1): Labs reviewed  Medicine Test Summarized or Ordered (1): None.   Independent Review of EKG or X-RAY(2 each): Hip x-ray ordered and reviewed    The visit lasted a total of 25 minutes face to face with the patient. Over 50% of the time was spent counseling and educating the patient about hip pain.    Dragon dictation was used for this note. Speech recognition errors are a possibility.     MEDICATIONS:  Current Outpatient Medications   Medication Sig Dispense Refill     apixaban ANTICOAGULANT (ELIQUIS) 2.5 mg Tab tablet [APIXABAN ANTICOAGULANT (ELIQUIS) 2.5 MG TAB TABLET] Take 1 tablet (2.5 mg total) by mouth 2 (two) times a day. 180 tablet 3     biotin 5,000 mcg TbDL [BIOTIN 5,000 MCG TBDL] Take by mouth.       cholecalciferol, vitamin D3, 1,000 unit tablet [CHOLECALCIFEROL, VITAMIN D3, 1,000 UNIT TABLET] Take 1,000 Units by mouth daily.       clindamycin (CLEOCIN) 300 MG capsule [CLINDAMYCIN (CLEOCIN) 300 MG CAPSULE] Prior to dental appointments  0     cyanocobalamin 1000 MCG tablet [CYANOCOBALAMIN 1000 MCG TABLET] Take 1 tablet (1,000 mcg total) by mouth daily. 90 tablet 3     diclofenac sodium (VOLTAREN) 1 % Gel [DICLOFENAC SODIUM (VOLTAREN) 1 % GEL] Apply topically 4 (four) times a day.       furosemide (LASIX) 20 MG tablet [FUROSEMIDE (LASIX) 20 MG TABLET] Take 1 tablet (20 mg total) by mouth daily. 90 tablet 3     losartan (COZAAR) 25 MG tablet [LOSARTAN (COZAAR) 25 MG  TABLET] Take 1 tablet (25 mg total) by mouth daily. 90 tablet 3     melatonin 3 mg Tab tablet [MELATONIN 3 MG TAB TABLET] Take 3 mg by mouth at bedtime as needed.       metoprolol tartrate (LOPRESSOR) 25 MG tablet [METOPROLOL TARTRATE (LOPRESSOR) 25 MG TABLET] Take 1 tablet (25 mg total) by mouth 2 (two) times a day. 180 tablet 3     multivitamin with minerals (THERA-M) 9 mg iron-400 mcg Tab tablet [MULTIVITAMIN WITH MINERALS (THERA-M) 9 MG IRON-400 MCG TAB TABLET] Take 1 tablet by mouth daily.       pantoprazole (PROTONIX) 40 MG tablet [PANTOPRAZOLE (PROTONIX) 40 MG TABLET] Take 1 tablet (40 mg total) by mouth daily. 90 tablet 3     potassium chloride (K-DUR,KLOR-CON) 10 MEQ tablet [POTASSIUM CHLORIDE (K-DUR,KLOR-CON) 10 MEQ TABLET] Take 1 tablet (10 mEq total) by mouth daily. 90 tablet 3     traMADol (ULTRAM) 50 MG tablet Take 1 tablet (50 mg) by mouth every 6 hours as needed for severe pain 15 tablet 0

## 2021-12-28 NOTE — PATIENT INSTRUCTIONS
1.  Schedule home PT for hip pain.    2.  Refill Tramadol.  Take tylenol with this.    3.  Orthopedic consult if symptoms do not improve with therapy    4.  See in three months or as needed.

## 2021-12-30 ENCOUNTER — TELEPHONE (OUTPATIENT)
Dept: INTERNAL MEDICINE | Facility: CLINIC | Age: 86
End: 2021-12-30
Payer: COMMERCIAL

## 2021-12-30 DIAGNOSIS — M25.551 HIP PAIN, RIGHT: Primary | ICD-10-CM

## 2021-12-30 NOTE — TELEPHONE ENCOUNTER
Reason for Call:  Other call back    Detailed comments: [facility] unable to take on patient but has found another facility that will:  [facility]   Place order for Home care starting 01/4/2022    Phone Number Patient can be reached at: Other phone number:  [phone]    Best Time: anytime    Can we leave a detailed message on this number? YES    Call taken on 12/30/2021 at 2:09 PM by Sanjuana Cunha

## 2021-12-31 ENCOUNTER — TELEPHONE (OUTPATIENT)
Dept: INTERNAL MEDICINE | Facility: CLINIC | Age: 86
End: 2021-12-31

## 2022-01-01 ENCOUNTER — TRANSCRIBE ORDERS (OUTPATIENT)
Dept: OTHER | Age: 87
End: 2022-01-01

## 2022-01-01 ENCOUNTER — NURSE TRIAGE (OUTPATIENT)
Dept: NURSING | Facility: CLINIC | Age: 87
End: 2022-01-01
Payer: COMMERCIAL

## 2022-01-01 ENCOUNTER — TRANSFERRED RECORDS (OUTPATIENT)
Dept: HEALTH INFORMATION MANAGEMENT | Facility: CLINIC | Age: 87
End: 2022-01-01

## 2022-01-01 ENCOUNTER — TELEPHONE (OUTPATIENT)
Dept: INTERNAL MEDICINE | Facility: CLINIC | Age: 87
End: 2022-01-01

## 2022-01-01 ENCOUNTER — OFFICE VISIT (OUTPATIENT)
Dept: INTERNAL MEDICINE | Facility: CLINIC | Age: 87
End: 2022-01-01
Payer: COMMERCIAL

## 2022-01-01 ENCOUNTER — TELEPHONE (OUTPATIENT)
Dept: NEUROLOGY | Facility: CLINIC | Age: 87
End: 2022-01-01

## 2022-01-01 ENCOUNTER — ANCILLARY PROCEDURE (OUTPATIENT)
Dept: GENERAL RADIOLOGY | Facility: CLINIC | Age: 87
End: 2022-01-01
Attending: INTERNAL MEDICINE
Payer: COMMERCIAL

## 2022-01-01 ENCOUNTER — MEDICAL CORRESPONDENCE (OUTPATIENT)
Dept: HEALTH INFORMATION MANAGEMENT | Facility: CLINIC | Age: 87
End: 2022-01-01
Payer: COMMERCIAL

## 2022-01-01 ENCOUNTER — LAB (OUTPATIENT)
Dept: LAB | Facility: CLINIC | Age: 87
End: 2022-01-01
Payer: COMMERCIAL

## 2022-01-01 ENCOUNTER — MEDICAL CORRESPONDENCE (OUTPATIENT)
Dept: HEALTH INFORMATION MANAGEMENT | Facility: CLINIC | Age: 87
End: 2022-01-01

## 2022-01-01 ENCOUNTER — TELEPHONE (OUTPATIENT)
Dept: NURSING | Facility: CLINIC | Age: 87
End: 2022-01-01

## 2022-01-01 ENCOUNTER — TELEPHONE (OUTPATIENT)
Dept: ORTHOPEDICS | Facility: CLINIC | Age: 87
End: 2022-01-01

## 2022-01-01 ENCOUNTER — TELEPHONE (OUTPATIENT)
Dept: NEUROSURGERY | Facility: CLINIC | Age: 87
End: 2022-01-01

## 2022-01-01 VITALS
OXYGEN SATURATION: 97 % | HEART RATE: 90 BPM | BODY MASS INDEX: 23.6 KG/M2 | WEIGHT: 125 LBS | TEMPERATURE: 97.2 F | SYSTOLIC BLOOD PRESSURE: 132 MMHG | DIASTOLIC BLOOD PRESSURE: 70 MMHG | RESPIRATION RATE: 16 BRPM | HEIGHT: 61 IN

## 2022-01-01 VITALS
OXYGEN SATURATION: 98 % | DIASTOLIC BLOOD PRESSURE: 64 MMHG | SYSTOLIC BLOOD PRESSURE: 118 MMHG | BODY MASS INDEX: 23.24 KG/M2 | HEART RATE: 86 BPM | WEIGHT: 121 LBS

## 2022-01-01 VITALS
HEART RATE: 73 BPM | OXYGEN SATURATION: 96 % | DIASTOLIC BLOOD PRESSURE: 77 MMHG | SYSTOLIC BLOOD PRESSURE: 138 MMHG | TEMPERATURE: 98.3 F

## 2022-01-01 VITALS
OXYGEN SATURATION: 98 % | SYSTOLIC BLOOD PRESSURE: 136 MMHG | BODY MASS INDEX: 24.63 KG/M2 | WEIGHT: 128.2 LBS | HEART RATE: 88 BPM | DIASTOLIC BLOOD PRESSURE: 72 MMHG

## 2022-01-01 DIAGNOSIS — M81.0 AGE-RELATED OSTEOPOROSIS WITHOUT CURRENT PATHOLOGICAL FRACTURE: Primary | ICD-10-CM

## 2022-01-01 DIAGNOSIS — Z51.81 ENCOUNTER FOR THERAPEUTIC DRUG MONITORING: Primary | ICD-10-CM

## 2022-01-01 DIAGNOSIS — M81.0 SENILE OSTEOPOROSIS: ICD-10-CM

## 2022-01-01 DIAGNOSIS — R60.9 EDEMA, UNSPECIFIED TYPE: ICD-10-CM

## 2022-01-01 DIAGNOSIS — I48.20 CHRONIC ATRIAL FIBRILLATION (H): ICD-10-CM

## 2022-01-01 DIAGNOSIS — E78.2 MIXED HYPERLIPIDEMIA: ICD-10-CM

## 2022-01-01 DIAGNOSIS — Z00.00 HEALTH CARE MAINTENANCE: ICD-10-CM

## 2022-01-01 DIAGNOSIS — M25.551 HIP PAIN, RIGHT: Primary | ICD-10-CM

## 2022-01-01 DIAGNOSIS — I50.32 DIASTOLIC CHF, CHRONIC (H): ICD-10-CM

## 2022-01-01 DIAGNOSIS — I10 ESSENTIAL HYPERTENSION: Chronic | ICD-10-CM

## 2022-01-01 DIAGNOSIS — M54.50 LOW BACK PAIN: Primary | ICD-10-CM

## 2022-01-01 DIAGNOSIS — Z51.81 ENCOUNTER FOR THERAPEUTIC DRUG MONITORING: ICD-10-CM

## 2022-01-01 DIAGNOSIS — R30.0 DYSURIA: ICD-10-CM

## 2022-01-01 DIAGNOSIS — Z95.810 ICD (IMPLANTABLE CARDIOVERTER-DEFIBRILLATOR), SINGLE, IN SITU: ICD-10-CM

## 2022-01-01 DIAGNOSIS — M70.61 TROCHANTERIC BURSITIS OF RIGHT HIP: Primary | ICD-10-CM

## 2022-01-01 DIAGNOSIS — N18.31 STAGE 3A CHRONIC KIDNEY DISEASE (H): ICD-10-CM

## 2022-01-01 DIAGNOSIS — R30.0 DYSURIA: Primary | ICD-10-CM

## 2022-01-01 DIAGNOSIS — N18.30 CHRONIC KIDNEY DISEASE, STAGE 3 (H): ICD-10-CM

## 2022-01-01 DIAGNOSIS — M70.61 TROCHANTERIC BURSITIS OF RIGHT HIP: ICD-10-CM

## 2022-01-01 DIAGNOSIS — K21.9 GASTROESOPHAGEAL REFLUX DISEASE, UNSPECIFIED WHETHER ESOPHAGITIS PRESENT: ICD-10-CM

## 2022-01-01 DIAGNOSIS — I63.40 CEREBROVASCULAR ACCIDENT (CVA) DUE TO EMBOLISM OF CEREBRAL ARTERY (H): ICD-10-CM

## 2022-01-01 LAB
ALBUMIN UR-MCNC: 30 MG/DL
ANION GAP SERPL CALCULATED.3IONS-SCNC: 11 MMOL/L (ref 5–18)
ANION GAP SERPL CALCULATED.3IONS-SCNC: 11 MMOL/L (ref 7–15)
APPEARANCE UR: ABNORMAL
BACTERIA #/AREA URNS HPF: ABNORMAL /HPF
BACTERIA UR CULT: ABNORMAL
BILIRUB UR QL STRIP: NEGATIVE
BUN SERPL-MCNC: 17 MG/DL (ref 8–28)
BUN SERPL-MCNC: 18.2 MG/DL (ref 8–23)
CALCIUM SERPL-MCNC: 9.6 MG/DL (ref 8.2–9.6)
CALCIUM SERPL-MCNC: 9.6 MG/DL (ref 8.5–10.5)
CHLORIDE BLD-SCNC: 105 MMOL/L (ref 98–107)
CHLORIDE SERPL-SCNC: 98 MMOL/L (ref 98–107)
CO2 SERPL-SCNC: 25 MMOL/L (ref 22–31)
COLOR UR AUTO: YELLOW
CREAT SERPL-MCNC: 0.87 MG/DL (ref 0.6–1.1)
CREAT SERPL-MCNC: 0.91 MG/DL (ref 0.51–0.95)
CREAT UR-MCNC: 46 MG/DL
DEPRECATED HCO3 PLAS-SCNC: 25 MMOL/L (ref 22–29)
ERYTHROCYTE [DISTWIDTH] IN BLOOD BY AUTOMATED COUNT: 13.8 % (ref 10–15)
GFR SERPL CREATININE-BSD FRML MDRD: 59 ML/MIN/1.73M2
GFR SERPL CREATININE-BSD FRML MDRD: 63 ML/MIN/1.73M2
GLUCOSE BLD-MCNC: 72 MG/DL (ref 70–125)
GLUCOSE SERPL-MCNC: 84 MG/DL (ref 70–99)
GLUCOSE UR STRIP-MCNC: NEGATIVE MG/DL
HCT VFR BLD AUTO: 36.9 % (ref 35–47)
HGB BLD-MCNC: 11.5 G/DL (ref 11.7–15.7)
HGB UR QL STRIP: ABNORMAL
KETONES UR STRIP-MCNC: NEGATIVE MG/DL
LEUKOCYTE ESTERASE UR QL STRIP: ABNORMAL
MCH RBC QN AUTO: 29 PG (ref 26.5–33)
MCHC RBC AUTO-ENTMCNC: 31.2 G/DL (ref 31.5–36.5)
MCV RBC AUTO: 93 FL (ref 78–100)
MICROALBUMIN UR-MCNC: 3.22 MG/DL (ref 0–1.99)
MICROALBUMIN/CREAT UR: 70 MG/G CR
NITRATE UR QL: POSITIVE
PH UR STRIP: 5.5 [PH] (ref 5–8)
PLATELET # BLD AUTO: 270 10E3/UL (ref 150–450)
POTASSIUM BLD-SCNC: 4.2 MMOL/L (ref 3.5–5)
POTASSIUM SERPL-SCNC: 4.6 MMOL/L (ref 3.4–5.3)
RBC # BLD AUTO: 3.97 10E6/UL (ref 3.8–5.2)
RBC #/AREA URNS AUTO: ABNORMAL /HPF
SODIUM SERPL-SCNC: 134 MMOL/L (ref 136–145)
SODIUM SERPL-SCNC: 141 MMOL/L (ref 136–145)
SP GR UR STRIP: 1.01 (ref 1–1.03)
SQUAMOUS #/AREA URNS AUTO: ABNORMAL /LPF
UROBILINOGEN UR STRIP-ACNC: 0.2 E.U./DL
WBC # BLD AUTO: 8 10E3/UL (ref 4–11)
WBC #/AREA URNS AUTO: ABNORMAL /HPF
WBC CLUMPS #/AREA URNS HPF: PRESENT /HPF

## 2022-01-01 PROCEDURE — 80048 BASIC METABOLIC PNL TOTAL CA: CPT | Performed by: INTERNAL MEDICINE

## 2022-01-01 PROCEDURE — 87186 SC STD MICRODIL/AGAR DIL: CPT

## 2022-01-01 PROCEDURE — 36415 COLL VENOUS BLD VENIPUNCTURE: CPT

## 2022-01-01 PROCEDURE — 99214 OFFICE O/P EST MOD 30 MIN: CPT | Mod: 25 | Performed by: INTERNAL MEDICINE

## 2022-01-01 PROCEDURE — 20610 DRAIN/INJ JOINT/BURSA W/O US: CPT | Performed by: INTERNAL MEDICINE

## 2022-01-01 PROCEDURE — 99214 OFFICE O/P EST MOD 30 MIN: CPT | Performed by: INTERNAL MEDICINE

## 2022-01-01 PROCEDURE — 82043 UR ALBUMIN QUANTITATIVE: CPT

## 2022-01-01 PROCEDURE — 36415 COLL VENOUS BLD VENIPUNCTURE: CPT | Performed by: INTERNAL MEDICINE

## 2022-01-01 PROCEDURE — 85027 COMPLETE CBC AUTOMATED: CPT | Performed by: INTERNAL MEDICINE

## 2022-01-01 PROCEDURE — 80048 BASIC METABOLIC PNL TOTAL CA: CPT

## 2022-01-01 PROCEDURE — 81001 URINALYSIS AUTO W/SCOPE: CPT

## 2022-01-01 PROCEDURE — 73502 X-RAY EXAM HIP UNI 2-3 VIEWS: CPT | Mod: TC | Performed by: RADIOLOGY

## 2022-01-01 PROCEDURE — 87086 URINE CULTURE/COLONY COUNT: CPT

## 2022-01-01 RX ORDER — LIDOCAINE HYDROCHLORIDE 10 MG/ML
5 INJECTION, SOLUTION INFILTRATION; PERINEURAL ONCE
Status: DISCONTINUED | OUTPATIENT
Start: 2022-01-01 | End: 2023-01-01

## 2022-01-01 RX ORDER — HYDROCODONE BITARTRATE AND ACETAMINOPHEN 5; 325 MG/1; MG/1
1 TABLET ORAL PRN
COMMUNITY
Start: 2022-01-01 | End: 2023-01-01

## 2022-01-01 RX ORDER — METHYLPREDNISOLONE ACETATE 80 MG/ML
80 INJECTION, SUSPENSION INTRA-ARTICULAR; INTRALESIONAL; INTRAMUSCULAR; SOFT TISSUE ONCE
Status: COMPLETED | OUTPATIENT
Start: 2022-01-01 | End: 2022-01-01

## 2022-01-01 RX ORDER — FUROSEMIDE 20 MG
TABLET ORAL
Qty: 45 TABLET | Refills: 3
Start: 2022-01-01

## 2022-01-01 RX ORDER — METHYLPREDNISOLONE ACETATE 80 MG/ML
80 INJECTION, SUSPENSION INTRA-ARTICULAR; INTRALESIONAL; INTRAMUSCULAR; SOFT TISSUE ONCE
Status: DISCONTINUED | OUTPATIENT
Start: 2022-01-01 | End: 2023-01-01

## 2022-01-01 RX ORDER — METHYLPREDNISOLONE 4 MG
TABLET, DOSE PACK ORAL
COMMUNITY
Start: 2022-01-01 | End: 2023-01-01

## 2022-01-01 RX ORDER — TRAMADOL HYDROCHLORIDE 50 MG/1
50 TABLET ORAL EVERY 6 HOURS PRN
Qty: 10 TABLET | Refills: 0 | Status: SHIPPED | OUTPATIENT
Start: 2022-01-01 | End: 2022-01-01

## 2022-01-01 RX ORDER — SULFAMETHOXAZOLE/TRIMETHOPRIM 800-160 MG
1 TABLET ORAL 2 TIMES DAILY
Qty: 6 TABLET | Refills: 0 | Status: SHIPPED | OUTPATIENT
Start: 2022-01-01 | End: 2022-01-01

## 2022-01-01 RX ORDER — PANTOPRAZOLE SODIUM 40 MG/1
40 TABLET, DELAYED RELEASE ORAL DAILY
Qty: 90 TABLET | Refills: 3 | Status: SHIPPED | OUTPATIENT
Start: 2022-01-01 | End: 2023-01-01

## 2022-01-01 RX ADMIN — METHYLPREDNISOLONE ACETATE 80 MG: 80 INJECTION, SUSPENSION INTRA-ARTICULAR; INTRALESIONAL; INTRAMUSCULAR; SOFT TISSUE at 11:15

## 2022-01-01 ASSESSMENT — PATIENT HEALTH QUESTIONNAIRE - PHQ9
10. IF YOU CHECKED OFF ANY PROBLEMS, HOW DIFFICULT HAVE THESE PROBLEMS MADE IT FOR YOU TO DO YOUR WORK, TAKE CARE OF THINGS AT HOME, OR GET ALONG WITH OTHER PEOPLE: NOT DIFFICULT AT ALL
SUM OF ALL RESPONSES TO PHQ QUESTIONS 1-9: 4
SUM OF ALL RESPONSES TO PHQ QUESTIONS 1-9: 4

## 2022-01-03 ENCOUNTER — TELEPHONE (OUTPATIENT)
Dept: INTERNAL MEDICINE | Facility: CLINIC | Age: 87
End: 2022-01-03
Payer: COMMERCIAL

## 2022-01-03 DIAGNOSIS — M25.551 HIP PAIN, RIGHT: Primary | ICD-10-CM

## 2022-01-03 NOTE — TELEPHONE ENCOUNTER
Talked to Nigel Kaufman, Helen's son.  He asks that I call Helen in 15 minutes and writer will do so.

## 2022-01-03 NOTE — TELEPHONE ENCOUNTER
Writer spoke to [facility].  They want to set up CT scan themselves.  They were given the phone number for scheduling.

## 2022-01-03 NOTE — TELEPHONE ENCOUNTER
Sorry.  I forgot about the pacemaker when ordering the MRI.  We should do a CT scan as an alternate

## 2022-01-03 NOTE — TELEPHONE ENCOUNTER
Reason for Call:  Other call back    Detailed comments: Pt unable to have MRI of hip because she has a Pacemaker -  Family wondering if she could go some where else other than [facility] (they will not do the MRI because of the pace maker)  Please advise    Please call radha Manning    Phone Number Patient can be reached at: Cell number on file:    Telephone Information:   Mobile [phone]       Best Time: anytime    Can we leave a detailed message on this number? YES    Call taken on 1/3/2022 at 9:46 AM by Sanjuana Cunha

## 2022-01-04 ENCOUNTER — HOSPITAL ENCOUNTER (OUTPATIENT)
Dept: CT IMAGING | Facility: HOSPITAL | Age: 87
Discharge: HOME | End: 2022-01-04
Attending: INTERNAL MEDICINE | Admitting: INTERNAL MEDICINE
Payer: COMMERCIAL

## 2022-01-04 DIAGNOSIS — M25.551 HIP PAIN, RIGHT: ICD-10-CM

## 2022-01-04 PROCEDURE — 72192 CT PELVIS W/O DYE: CPT

## 2022-01-07 ENCOUNTER — TELEPHONE (OUTPATIENT)
Dept: INTERNAL MEDICINE | Facility: CLINIC | Age: 87
End: 2022-01-07
Payer: COMMERCIAL

## 2022-01-10 DIAGNOSIS — M16.0 PRIMARY OSTEOARTHRITIS OF BOTH HIPS: Primary | ICD-10-CM

## 2022-01-12 ENCOUNTER — MEDICAL CORRESPONDENCE (OUTPATIENT)
Dept: HEALTH INFORMATION MANAGEMENT | Facility: CLINIC | Age: 87
End: 2022-01-12
Payer: COMMERCIAL

## 2022-01-14 ENCOUNTER — MEDICAL CORRESPONDENCE (OUTPATIENT)
Dept: HEALTH INFORMATION MANAGEMENT | Facility: CLINIC | Age: 87
End: 2022-01-14
Payer: COMMERCIAL

## 2022-01-14 ENCOUNTER — TELEPHONE (OUTPATIENT)
Dept: INTERNAL MEDICINE | Facility: CLINIC | Age: 87
End: 2022-01-14

## 2022-01-14 NOTE — TELEPHONE ENCOUNTER
Reason for Call: Request for an order or referral:    Order or referral being requested:   Physical therapy  1 time weekly for 5 weeks  2PRN  For: right hip pain    Date needed: as soon as possible    Has the patient been seen by the PCP for this problem? YES    Additional comments: n/a    Phone number Patient can be reached at:  Other phone number: [phone]    Best Time:  anytime    Can we leave a detailed message on this number?  YES    Call taken on 1/14/2022 at 3:48 PM by Sanjuana Cunha

## 2022-01-17 ENCOUNTER — TRANSFERRED RECORDS (OUTPATIENT)
Dept: HEALTH INFORMATION MANAGEMENT | Facility: CLINIC | Age: 87
End: 2022-01-17
Payer: COMMERCIAL

## 2022-01-21 ENCOUNTER — TELEPHONE (OUTPATIENT)
Dept: INTERNAL MEDICINE | Facility: CLINIC | Age: 87
End: 2022-01-21
Payer: COMMERCIAL

## 2022-01-21 DIAGNOSIS — M25.551 HIP PAIN, RIGHT: Primary | ICD-10-CM

## 2022-01-21 RX ORDER — GABAPENTIN 100 MG/1
100 CAPSULE ORAL 3 TIMES DAILY
Qty: 90 CAPSULE | Refills: 3 | Status: SHIPPED | OUTPATIENT
Start: 2022-01-21

## 2022-01-21 NOTE — TELEPHONE ENCOUNTER
Only had Tramadol for short term - has not had for awhile  Increase in pain due to starting PT this week     Unable to walk the day after PT    Wondering if Gabapentin might be something she can use?

## 2022-01-21 NOTE — TELEPHONE ENCOUNTER
She could try gabapentin 100 mg 3 times daily as needed for pain.  We will send a prescription to her pharmacy.  Let me know if this is helpful.

## 2022-01-21 NOTE — TELEPHONE ENCOUNTER
Reason for Call:  Other call back    Detailed comments: Daughter in law is calling as pt is having a lot of pain in her hips.  She lives with her and she is having a really hard time.  It was taking her a minute to walk one step as it is so hard for her to walk after Physical Therapy due to pain.  Yesterday she was better but still in pain and today is awful for Helen after physcial therapy.    She is wondering if any stronger pain meds can be sent into the pharm.    New pharm is updated to [facility]    Phone Number Patient can be reached at: Other phone number:  [phone]    Best Time: any    Can we leave a detailed message on this number? YES    Call taken on 1/21/2022 at 12:20 PM by Pam J. Behr

## 2022-01-21 NOTE — TELEPHONE ENCOUNTER
She previously was prescribed tramadol for pain.  She is still using this?  Beyond this we would be using a medication such as hydrocodone which would have a higher risk of side effects.

## 2022-01-31 ENCOUNTER — TELEPHONE (OUTPATIENT)
Dept: INTERNAL MEDICINE | Facility: CLINIC | Age: 87
End: 2022-01-31
Payer: COMMERCIAL

## 2022-01-31 NOTE — TELEPHONE ENCOUNTER
Reason for Call:  Ann-Marie is calling to request the office visit notes from 12/28/21 be addended to include the muscle tear is causing the hip pain, and this is why she is getting home PT.    Detailed comments: please let Ann-Marie know when done, she can then access [facility] for insurance reimbursment.    Phone Number Patient can be reached at: Other phone number: [phone] ext [phone]    Best Time: any    Can we leave a detailed message on this number? YES    Call taken on 1/31/2022 at 1:32 PM by Chela Lacy

## 2022-02-28 ENCOUNTER — MEDICAL CORRESPONDENCE (OUTPATIENT)
Dept: HEALTH INFORMATION MANAGEMENT | Facility: CLINIC | Age: 87
End: 2022-02-28
Payer: COMMERCIAL

## 2022-04-01 NOTE — PROGRESS NOTES
1. Age-related osteoporosis without current pathological fracture  She has been on Prolia, but had her last injection on 6/20/2021.  She had no adverse effects from the medication, but does not wish to restart it at this time.  She had a lowest T score of -3.7 in the wrist when checked in November 2019 a follow-up bone density study is advised  - DX Hip/Pelvis/Spine; Future    2. Encounter for therapeutic drug monitoring  She has a history of stage IIIa chronic renal insufficiency.  Renal function will be checked  - BASIC METABOLIC PANEL; Future    3. Edema, unspecified type  Edema has been under good control.  She is taking furosemide 20 mg daily and wonders if she could cut the dose.  This seems reasonable.  - furosemide (LASIX) 20 MG tablet; One half tab daily  Dispense: 45 tablet; Refill: 3    4. 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  She was just sent information on a watchman versus anticoagulations study.  After discussion, she is not interested in watchman or the study at this time.    5. Chronic atrial fibrillation (H)  Now on Eliquis as an anticoagulant    6. Diastolic CHF, chronic (H)  She appears well compensated    7. ICD (implantable cardioverter-defibrillator), single, in situ  She has a past history of torsade de pointe    Patient Instructions   1.  OK to decrease Furosemide to 10 mg daily.  Would need to increase again if peripheral edema gets worse on the lower dose.    2.  Fourth Covid vaccine in the future.    3.  DEXA scan     4.  Due for Prolia.  Last injection 6/28/21    5.  See in three months or as needed.    6.  Skip Eliquis for 3 days before planned dental extraction then resume usual dose    Va Cervantes is a 89 year old who presents for the follow-up of chronic atrial fibrillation, osteoporosis, and other concerns.  Her bone density study from November 2019 was notable for a lowest T  "score of -3.7 in the wrist.  She has been on alendronate with last injection in June 2021.  She prefers not to have an additional injection at this time.  She did not have any adverse effects from Prolia.    She is on furosemide 20 mg daily due to peripheral edema felt related to a combination of venous insufficiency and diastolic heart failure.  Symptoms have been under good control.  She complains of marked urinary frequency and wonders if she could lower the dose.    She has a history of chronic atrial fibrillation.  She did have a previous embolic CVA while on warfarin.  She fortunately made an excellent recovery.  She just received information regarding a watchman versus anticoagulation study.  This was discussed.  She prefers not to take part of the study at this time.    She is now on Eliquis for an anticoagulant.  She has an upcoming dental extractions scheduled.    History of Present Illness       Reason for visit:  Follow up    She eats 0-1 servings of fruits and vegetables daily.She consumes 2 sweetened beverage(s) daily.She exercises with enough effort to increase her heart rate 30 to 60 minutes per day.  She exercises with enough effort to increase her heart rate 3 or less days per week.   She is taking medications regularly.           Review of Systems   See above.  Review of systems otherwise negative      Objective    There were no vitals taken for this visit.  There is no height or weight on file to calculate BMI.  Physical Exam   /70 (BP Location: Left arm, Patient Position: Sitting, Cuff Size: Adult Regular)   Pulse 90   Temp 97.2  F (36.2  C) (Temporal)   Resp 16   Ht 1.537 m (5' 0.5\")   Wt 56.7 kg (125 lb)   SpO2 97%   BMI 24.01 kg/m      General Appearance:  Alert, cooperative, no distress, appears stated age   Head:  Normocephalic, without obvious abnormality, atraumatic   Eyes:  PERRL, conjunctiva/corneas clear, EOM's intact   Ears:  Normal TM's and external ear canals, both ears "   Nose: Nares normal, septum midline,mucosa normal, no drainage    Throat: Lips, mucosa, and tongue normal; teeth and gums normal   Neck: Supple, symmetrical, trachea midline, no adenopathy;  thyroid: not enlarged, symmetric, no tenderness/mass/nodules; no carotid bruit or JVD   Back:    mild scoliosis   Lungs:   Clear to auscultation bilaterally, respirations unlabored   Breasts:   Not examined   Heart:  Regular rate and rhythm, S1 and S2 normal, no murmur, rub, or gallop   Abdomen:   Soft, non-tender, bowel sounds active all four quadrants,  no masses, no organomegaly   Genitalia:  Not examined   Pelvic: Deferred   Extremities: , no cyanosis or edema.  No foot ulcers.  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  Cranial nerves, motor and sensory exams intact with symmetric DTRs         No results found for any visits on 04/01/22.    Visit time and note preparation over 30 minutes.

## 2022-04-01 NOTE — PATIENT INSTRUCTIONS
1.  OK to decrease Furosemide to 10 mg daily.  Would need to increase again if peripheral edema gets worse on the lower dose.    2.  Fourth Covid vaccine in the future.    3.  DEXA scan     4.  Due for Prolia.  Last injection 6/28/21    5.  See in three months or as needed.

## 2022-05-23 NOTE — TELEPHONE ENCOUNTER
"Best to check a UA/UC as a \"lab only \"appointment.  Then start Bactrim DS 1 twice daily for 3 days while awaiting urine culture results.  " none

## 2022-05-23 NOTE — TELEPHONE ENCOUNTER
Attempted to contact patient to relay message below from Dr Delaney. No answer. Left message to call clinic. If patient calls back, please help her schedule Lab Only appointment for UA/UC and inform her about the prescription sent to the pharmacy.    Tomer Dutta RN  [facility]

## 2022-05-23 NOTE — TELEPHONE ENCOUNTER
Lab appoint was scheduled for tomorrow. Closing encounter.       Jing Chacko RN   [facility]

## 2022-05-23 NOTE — TELEPHONE ENCOUNTER
"Nurse Triage SBAR    Is this a 2nd Level Triage? YES, LICENSED PRACTITIONER REVIEW IS REQUIRED    Situation:   Pt reports pain with urination.  Intermittent.  Ongoing \"at least one month.\"  Worse today.    Frequency, urgency.  Also low abdominal pain.  Pt states \"I have to crunch when I urinate.\"  Also intermittent.    Background:   Pt reports symptoms had onset vaguely at time of last OV (4/1/2022).  Had urine albumin test -> abnormal result.  No other urinary labs.    Assessment:   No fevers or flank pain at any point.  Pt feels well otherwise.    Protocol Recommended Disposition:   See Today In Office, See More Appropriate Protocol  Pt declines OV or virtual visit.  Requests an Rx for presumptive UTi.  Please advise ...  Pharmacy verified -> [facility] in [place].  Routed to provider for advice on next steps.    Does the patient meet one of the following criteria for ADS visit consideration? 16+ years old, with an [facility] PCP -> Yes.    TIP  Providers, please consider if this condition is appropriate for management at one of our Acute and Diagnostic Services sites.     If patient is a good candidate, please use dotphrase <dot>triageresponse and select Refer to ADS to document.    Best phone # for pt -> [phone]     Thank you-    Pharmacy   [facility] Nurse Advisor     Reason for Disposition    Discomfort (pain, burning or stinging) when passing urine and female    Age > 50 years    Additional Information    Negative: Shock suspected (e.g., cold/pale/clammy skin, too weak to stand, low BP, rapid pulse)    Negative: Sounds like a life-threatening emergency to the triager    Negative: Shock suspected (e.g., cold/pale/clammy skin, too weak to stand, low BP, rapid pulse)    Negative: Sounds like a life-threatening emergency to the triager    Negative: Unable to urinate (or only a few drops) and bladder feels very full    Negative: Vomiting    Negative: Patient sounds very sick or weak to the triager    " "Negative: SEVERE pain with urination    Negative: Fever > 100.4 F (38.0 C)    Negative: Side (flank) or lower back pain present    Negative: Taking antibiotic > 24 hours for UTI and fever persists    Negative: Taking antibiotic > 3 days for UTI and painful urination not improved    Negative: Unusual vaginal discharge    Protocols used: URINARY SYMPTOMS-A-OH, URINATION PAIN - FEMALE-A-OH      ________________________      COVID 19 Nurse Triage Plan/Patient Instructions    Please be aware that novel coronavirus (COVID-19) may be circulating in the community. If you develop symptoms such as fever, cough, or SOB or if you have concerns about the presence of another infection including coronavirus (COVID-19), please contact your health care provider or visit [email or web address].     Disposition/Instructions    Additional COVID19 information to add for patients.   How can I protect others?  If you have symptoms (fever, cough, body aches or trouble breathing): Stay home and away from others (self-isolate) until:    At least 10 days have passed since your symptoms started, And     You ve had no fever--and no medicine that reduces fever--for 1 full day (24 hours), And      Your other symptoms have resolved (gotten better).     If you don t have symptoms, but a test showed that you have COVID-19 (you tested positive):    Stay home and away from others (self-isolate). Follow the tips under \"How do I self-isolate?\" below for 10 days (20 days if you have a weak immune system).    You don't need to be retested for COVID-19 before going back to school or work. As long as you're fever-free and feeling better, you can go back to school, work and other activities after waiting the 10 or 20 days.     How do I self-isolate?    Stay in your own room, even for meals. Use your own bathroom if you can.     Stay away from others in your home. No hugging, kissing or shaking hands. No visitors.    Don t go to work, school or anywhere " else.     Clean  high touch  surfaces often (doorknobs, counters, handles, etc.). Use a household cleaning spray or wipes. You ll find a full list on the EPA website:  www.epa.gov/pesticide-registration/list-n-disinfectants-use-against-sars-cov-2.    Cover your mouth and nose with a mask, tissue or washcloth to avoid spreading germs.    Wash your hands and face often. Use soap and water.    Caregivers in these groups are at risk for severe illness due to COVID-19:  o People 65 years and older  o People who live in a nursing home or long-term care facility  o People with chronic disease (lung, heart, cancer, diabetes, kidney, liver, immunologic)  o People who have a weakened immune system, including those who:  - Are in cancer treatment  - Take medicine that weakens the immune system, such as corticosteroids  - Had a bone marrow or organ transplant  - Have an immune deficiency  - Have poorly controlled HIV or AIDS  - Are obese (body mass index of 40 or higher)  - Smoke regularly    Caregivers should wear gloves while washing dishes, handling laundry and cleaning bedrooms and bathrooms.    Use caution when washing and drying laundry: Don t shake dirty laundry, and use the warmest water setting that you can.    For more tips, go to www.cdc.gov/coronavirus/2019-ncov/downloads/10Things.pdf.    How can I take care of myself?  1. Get lots of rest. Drink extra fluids (unless a doctor has told you not to).     2. Take Tylenol (acetaminophen) for fever or pain. If you have liver or kidney problems, ask your family doctor if it s okay to take Tylenol.     Adults can take either:     650 mg (two 325 mg pills) every 4 to 6 hours, or     1,000 mg (two 500 mg pills) every 8 hours as needed.     Note: Don t take more than 3,000 mg in one day.   Acetaminophen is found in many medicines (both prescribed and over-the-counter medicines). Read all labels to be sure you don t take too much.     For children, check the Tylenol bottle for  the right dose. The dose is based on the child s age or weight.    3. If you have other health problems (like cancer, heart failure, an organ transplant or severe kidney disease): Call your specialty clinic if you don t feel better in the next 2 days.    4. Know when to call 911: Emergency warning signs include:    Trouble breathing or shortness of breath    Pain or pressure in the chest that doesn t go away    Feeling confused like you haven t felt before, or not being able to wake up    Bluish-colored lips or face    What are the symptoms of COVID-19?     The most common symptoms are cough, fever and trouble breathing.     Less common symptoms include body aches, chills, diarrhea (loose, watery poops), fatigue (feeling very tired), headache, runny nose, sore throat and loss of smell.    COVID-19 can cause severe coughing (bronchitis) and lung infection (pneumonia).    How does it spread?     The virus may spread when a person coughs or sneezes into the air. The virus can travel about 6 feet this way, and it can live on surfaces.      Common  (household disinfectants) will kill the virus.    Who is at risk?  Anyone can catch COVID-19 if they re around someone who has the virus.    How can others protect themselves?     Stay away from people who have COVID-19 (or symptoms of COVID-19).    Wash hands often with soap and water. Or, use hand  with at least 60% alcohol.    Avoid touching the eyes, nose or mouth.     Wear a face mask when you go out in public, when sick or when caring for a sick person.    Where can I get more information?    [facility]: About COVID-19: [email or web address]    CDC: What to Do If You re Sick: www.cdc.gov/coronavirus/2019-ncov/about/steps-when-sick.html    CDC: Ending Home Isolation: www.cdc.gov/coronavirus/2019-ncov/hcp/disposition-in-home-patients.html     CDC: Caring for Someone: www.cdc.gov/coronavirus/2019-ncov/if-you-are-sick/care-for-someone.html      [facility]: Interim Guidance for Hospital Discharge to Home: www.[facility]./diseases/coronavirus/hcp/hospdischarge.pdf    [facility] clinical trials (COVID-19 research studies): [email or web address]-trials     Below are the COVID-19 hotlines at the [place] [facility] ([facility]). Interpreters are available.   o For health questions: Call 572-947-2392 or 1-183.479.3194 (7 a.m. to 7 p.m.)  o For questions about schools and childcare: Call 733-717-0020 or 1-639.649.3472 (7 a.m. to 7 p.m.)          Thank you for taking steps to prevent the spread of this virus.  o Limit your contact with others.  o Wear a simple mask to cover your cough.  o Wash your hands well and often.    Resources    [facility]: About COVID-19: [email or web address]    CDC: What to Do If You're Sick: www.cdc.gov/coronavirus/2019-ncov/about/steps-when-sick.html    CDC: Ending Home Isolation: www.cdc.gov/coronavirus/2019-ncov/hcp/disposition-in-home-patients.html     CDC: Caring for Someone: www.cdc.gov/coronavirus/2019-ncov/if-you-are-sick/care-for-someone.html     [facility]: Interim Guidance for Hospital Discharge to Home: www.[facility]./diseases/coronavirus/hcp/hospdischarge.pdf    [facility] clinical trials (COVID-19 research studies): [email or web address]     Below are the COVID-19 hotlines at the [place] [facility] ([facility]). Interpreters are available.   o For health questions: Call 837-753-0967 or 1-928.492.5430 (7 a.m. to 7 p.m.)  o For questions about schools and childcare: Call 947-200-1521 or 1-257.341.5908 (7 a.m. to 7 p.m.)

## 2022-07-12 NOTE — LETTER
July 14, 2022      Helen Beckham  [address] [place] [address]        Dear Helen:    We are writing to inform you of your test results.    Your hemoglobin is slightly low at 11.5.  This will be rechecked again in the future.  Other labs including your potassium, blood sugar, and kidney tests are good.  It was nice to see you.    Resulted Orders   CBC with platelets   Result Value Ref Range    WBC Count 8.0 4.0 - 11.0 10e3/uL    RBC Count 3.97 3.80 - 5.20 10e6/uL    Hemoglobin 11.5 (L) 11.7 - 15.7 g/dL    Hematocrit 36.9 35.0 - 47.0 %    MCV 93 78 - 100 fL    MCH 29.0 26.5 - 33.0 pg    MCHC 31.2 (L) 31.5 - 36.5 g/dL    RDW 13.8 10.0 - 15.0 %    Platelet Count 270 150 - 450 10e3/uL   Basic metabolic panel  (Ca, Cl, CO2, Creat, Gluc, K, Na, BUN)   Result Value Ref Range    Creatinine 0.91 0.51 - 0.95 mg/dL    Sodium 134 (L) 136 - 145 mmol/L    Potassium 4.6 3.4 - 5.3 mmol/L    Urea Nitrogen 18.2 8.0 - 23.0 mg/dL    Chloride 98 98 - 107 mmol/L    Carbon Dioxide (CO2) 25 22 - 29 mmol/L    Anion Gap 11 7 - 15 mmol/L    Glucose 84 70 - 99 mg/dL    GFR Estimate 59 (L) >60 mL/min/1.73m2      Comment:      Effective December 21, 2021 eGFRcr in adults is calculated using the 2021 CKD-EPI creatinine equation which includes age and gender (Rosi et al., NEJM, DOI: 10.1056/CEBWcu4951994)    Calcium 9.6 8.2 - 9.6 mg/dL       If you have any questions or concerns, please call the clinic at the number listed above.       Sincerely,      Surya Delaney MD

## 2022-07-12 NOTE — PATIENT INSTRUCTIONS
I will notify of test results    2.  Advise use of walker    3.  See in three months or as needed.    4.  Advise life alert.

## 2022-07-14 NOTE — PROGRESS NOTES
ASSESSMENT:  1.1. Encounter for therapeutic drug monitoring  Helen will have monitoring labs done today  - CBC with platelets; Future  - CBC with platelets    2. Stage 3a chronic kidney disease (H)  Renal function will be rechecked  - Basic metabolic panel  (Ca, Cl, CO2, Creat, Gluc, K, Na, BUN); Future  - Basic metabolic panel  (Ca, Cl, CO2, Creat, Gluc, K, Na, BUN)      4. Gastroesophageal reflux disease, unspecified whether esophagitis present  Symptoms seem under acceptable control with pantoprazole  - pantoprazole (PROTONIX) 40 MG EC tablet; Take 1 tablet (40 mg) by mouth daily  Dispense: 90 tablet; Refill: 3    5. Senile osteoporosis  She has a history of osteoporosis with lowest T score of -3.7 in the wrist in 2019.  She previously had been on Prolia but is not interested in resuming this or other antiresorptive therapy    6.  Upper respiratory infection:  She has noted upper respiratory symptoms over the past week with cough congestion congestion.  Cough is mainly nonproductive.  She was exposed to a great granddaughter with similar symptoms.  She has tested negative for COVID twice.  Symptoms sound viral.  Supportive management is appropriate.    7, chronic atrial fibrillation:  She is on Eliquis as an anticoagulant.  She does have a pacemaker has not noted symptomatic palpitations      PLAN:  Patient Instructions   1.  I will notify of test results    2.  Advise use of walker    3.  See in three months or as needed.    4.  Advise life alert.    5.  I will notify of laboratory test results    Orders Placed This Encounter   Procedures     CBC with platelets     Basic metabolic panel  (Ca, Cl, CO2, Creat, Gluc, K, Na, BUN)     Medications Discontinued During This Encounter   Medication Reason     sulfamethoxazole-trimethoprim (BACTRIM DS) 800-160 MG tablet Medication Reconciliation Clean Up     pantoprazole (PROTONIX) 40 MG tablet Reorder       Return in about 3 months (around 10/12/2022) for Follow  up.    ASSESSED PROBLEMS:  See above      CHIEF COMPLAINT:  Upper respiratory syndrome and follow-up of chronic issues    HISTORY OF PRESENT ILLNESS:  Helen is a 89 year old female seen with her daughter-in-law.  She reports she has noted upper respiratory symptoms for a week.  She has had 2 negative COVID tests.  She was exposed to her great granddaughter with similar symptoms.  She has not had fevers or chills.  She does have a nonproductive cough.  She feels symptoms are improving.    She has a history of osteoporosis with a lowest T score of -3.7 in the wrist when last checked.  She has been on Prolia but discontinued this.  She is not interested in restarting therapy at this time.    She has a past history of atrial fibrillation.  She does have an ICD in place due to previously noted torsade the point she is on low-dose furosemide due to a diagnosis of diastolic heart failure.  She feels well compensated and has not had any major issues with peripheral edema.    REVIEW OF SYSTEMS:  See above  Comprehensive review of systems is negative.    PFSH:  .  Lives independently.  Still enjoys going to wild hockey games.    TOBACCO USE:  History   Smoking Status     Never Smoker   Smokeless Tobacco     Never Used       VITALS:  Vitals:    07/12/22 1334   BP: 136/72   BP Location: Left arm   Patient Position: Sitting   Cuff Size: Adult Regular   Pulse: 88   SpO2: 98%   Weight: 58.2 kg (128 lb 3.2 oz)     Wt Readings from Last 3 Encounters:   07/12/22 58.2 kg (128 lb 3.2 oz)   04/01/22 56.7 kg (125 lb)   12/28/21 60.3 kg (133 lb)       PHYSICAL EXAM:  Constitutional:   Reveals an elderly woman.  She appears to have some moderate postural instability.  She is ambulating with a single prong cane.  She has difficulty getting up on exam table,  Vitals: per nursing notes.  HEENT: Atraumatic  Eyes: No conjunctival hyperemia.  Oropharynx:   Mouth and throat clear, no thrush or exudate.  Neck:  Supple, no carotid bruits or  adenopathy.  Back:  No spine or CVA pain.  Thorax: Pacer left chest  Lungs: Clear to A&P without rales or wheezes.  Respiratory effort normal.  Cardiac:   Regular rate and rhythm, normal S1, S2, no murmur or gallop.  Abdomen:  Soft, active bowel sounds without bruits, mass, or tenderness..  Neuro:  Alert and oriented. Cranial nerves, .  No gross focal deficits.  Appears unsteady  Psychiatric:  Memory intact, mood appropriate.    DATA REVIEWED:  Additional History from Old Records Summarized (2):  cardiology notes reviewed  Decision to Obtain Records (1): None.   Radiology Tests Summarized or Ordered (1): Previous DEXA scan reviewed  Labs Reviewed or Ordered (1): Labs reviewed and ordered  Medicine Test Summarized or Ordered (1): None.   Independent Review of EKG or X-RAY(2 each): None.    The visit lasted a total of 30 minutes face to face with the patient. Over 50% of the time was spent counseling and educating the patient about management of diastolic heart failure, osteoporosis, and other concerns.        Dragon dictation was used for this note. Speech recognition errors are a possibility.     MEDICATIONS:  Current Outpatient Medications   Medication Sig Dispense Refill     apixaban ANTICOAGULANT (ELIQUIS) 2.5 mg Tab tablet [APIXABAN ANTICOAGULANT (ELIQUIS) 2.5 MG TAB TABLET] Take 1 tablet (2.5 mg total) by mouth 2 (two) times a day. 180 tablet 3     biotin 5,000 mcg TbDL [BIOTIN 5,000 MCG TBDL] Take by mouth.       cholecalciferol, vitamin D3, 1,000 unit tablet [CHOLECALCIFEROL, VITAMIN D3, 1,000 UNIT TABLET] Take 1,000 Units by mouth daily.       clindamycin (CLEOCIN) 300 MG capsule [CLINDAMYCIN (CLEOCIN) 300 MG CAPSULE] Prior to dental appointments  0     cyanocobalamin 1000 MCG tablet [CYANOCOBALAMIN 1000 MCG TABLET] Take 1 tablet (1,000 mcg total) by mouth daily. 90 tablet 3     diclofenac sodium (VOLTAREN) 1 % Gel [DICLOFENAC SODIUM (VOLTAREN) 1 % GEL] Apply topically 4 (four) times a day.       furosemide  (LASIX) 20 MG tablet One half tab daily 45 tablet 3     gabapentin (NEURONTIN) 100 MG capsule Take 1 capsule (100 mg) by mouth 3 times daily 90 capsule 3     losartan (COZAAR) 25 MG tablet [LOSARTAN (COZAAR) 25 MG TABLET] Take 1 tablet (25 mg total) by mouth daily. 90 tablet 3     melatonin 3 mg Tab tablet [MELATONIN 3 MG TAB TABLET] Take 3 mg by mouth at bedtime as needed.       metoprolol tartrate (LOPRESSOR) 25 MG tablet [METOPROLOL TARTRATE (LOPRESSOR) 25 MG TABLET] Take 1 tablet (25 mg total) by mouth 2 (two) times a day. 180 tablet 3     multivitamin with minerals (THERA-M) 9 mg iron-400 mcg Tab tablet [MULTIVITAMIN WITH MINERALS (THERA-M) 9 MG IRON-400 MCG TAB TABLET] Take 1 tablet by mouth daily.       pantoprazole (PROTONIX) 40 MG EC tablet Take 1 tablet (40 mg) by mouth daily 90 tablet 3     potassium chloride (K-DUR,KLOR-CON) 10 MEQ tablet [POTASSIUM CHLORIDE (K-DUR,KLOR-CON) 10 MEQ TABLET] Take 1 tablet (10 mEq total) by mouth daily. 90 tablet 3

## 2022-10-17 NOTE — PATIENT INSTRUCTIONS
Right hip xray today    2.  Tylenol for pain.  Can use up to 3000 mg daily in divided doses.    3.  Tramadol 50 mg.  One four times daily as needed for pain    4.  Report effect of shot on pain in one week    5.  Covid vaccine and flu shot in about two weeks    6.  Annual wellness visit in three months

## 2022-10-17 NOTE — PROGRESS NOTES
1. Trochanteric bursitis of right hip  Helen has noted severe pain in the right lateral hip area and low back.  This began with gradual onset about 4 days ago.  There is no fall or trauma.  X-ray was done since she has underlying osteoporosis.  There is no evidence for fracture.  She has well localized tenderness over the greater trochanter and does have a past history of trochanteric bursitis.  She will try a steroid injection in the area.  She also was given a prescription for a small number of tramadol.  She is not a good candidate for NSAIDs given her age and use of Eliquis.  Discomfort could be partially related to the lower back.  However, she does not have true radicular symptoms  - methylPREDNISolone (DEPO-MEDROL) injection 80 mg  - lidocaine 1 % 5 mL  - XR Hip Right 2-3 Views; Future  - traMADol (ULTRAM) 50 MG tablet; Take 1 tablet (50 mg) by mouth every 6 hours as needed for severe pain  Dispense: 10 tablet; Refill: 0    2. Senile osteoporosis  She has underlying osteoporosis as noted above.  X-ray was done to evaluate for a fragility fracture.  There is no evidence for this.  Moderate DJD is noted    3. Essential hypertension  Blood pressure is acceptable    4. Chronic atrial fibrillation (H)  Has a history of atrial fibrillation.  She is on Eliquis as an anticoagulant and metoprolol for rate control.    Patient Instructions   1.  Right hip xray today    2.  Tylenol for pain.  Can use up to 3000 mg daily in divided doses.    3.  Tramadol 50 mg.  One four times daily as needed for pain    4.  Report effect of shot on pain in one week    5.  Covid vaccine and flu shot in about two weeks    6.  Annual wellness visit in three months    7.  After informed consent and sterilely prepping, the right greater trochanter area was infiltrated with 80 mg of Depo-Medrol with 3 cc of 1% lidocaine.  She tolerated the injection well.    Va Cervantes is a 89 year old accompanied by her daughter in law Vida,  presenting for the following health issues:  Recheck Medication and RECHECK  Odilia presents complaining of severe pain in the lateral aspect of the right hip.  She states symptoms began gradually about 4 days ago.  There is no fall or trauma.  She notes tenderness to touch over the area of the greater trochanter.  She also indicates some discomfort in the right buttock.  There is no radicular pain to below the knee.  She has a past history of greater trochanteric bursitis.  She also has underlying osteoporosis and has had intermittent low back issues in the past.    She is on chronic anticoagulation with Eliquis due to history of atrial fibs and prior embolic CVA.  She is on metoprolol for rate control    History of Present Illness       Back Pain:  She presents for follow up of back pain. Patient's back pain is a recurring problem.  Location of back pain:  Right hip  Description of back pain: sharp  Back pain spreads: right buttocks    Since patient first noticed back pain, pain is: rapidly worsening  Does back pain interfere with her job:  Not applicable      She eats 0-1 servings of fruits and vegetables daily.She consumes 1 sweetened beverage(s) daily.She exercises with enough effort to increase her heart rate 9 or less minutes per day.  She exercises with enough effort to increase her heart rate 3 or less days per week.   She is taking medications regularly.           Review of Systems   Constitutional, HEENT, cardiovascular, pulmonary, GI, , musculoskeletal, neuro, skin, endocrine and psych systems are negative, except as otherwise noted.      Objective    There were no vitals taken for this visit.  There is no height or weight on file to calculate BMI.  Physical Exam   GENERAL APPEARANCE: healthy, alert, moderate distress and cooperative  EYES: Eyes grossly normal to inspection and conjunctivae and sclerae normal  NECK: no adenopathy, no asymmetry, masses, or scars and thyroid normal to palpation  RESP:  lungs clear to auscultation - no rales, rhonchi or wheezes  CV: normal S1 S2, no S3 or S4, no murmur, click or rub and irregularly irregular rhythm  LYMPHATICS: no cervical adenopathy  ABDOMEN: soft, nontender, without hepatosplenomegaly or masses and bowel sounds normal  MS: No shortening or rotation of the legs.  Negative straight leg raise bilaterally.  Internal and external rotation of the hips fairly well-preserved.  She has well localized marked tenderness to touch over the right greater trochanter area.  She has lesser tenderness to touch over the left greater trochanter area.  SKIN: no suspicious lesions or rashes  NEURO: Normal strength and tone, mentation intact and speech normal    EXAM: XR HIP RIGHT 2-3 VIEWS  LOCATION: [facility]  DATE/TIME: 10/17/2022 2:20 PM     INDICATION:  Trochanteric bursitis of right hip  COMPARISON: None.                                                                      IMPRESSION: Moderate degenerative changes in the right hip joint. Osteopenia. No fractures are evident.    Visit time 30+ minutes

## 2022-10-27 NOTE — TELEPHONE ENCOUNTER
Reason for Call:  Appointment Request    Patient requesting this type of appt:  Hospital/ED Follow-Up     Requested provider: XANDER Cruz MD    Reason patient unable to be scheduled: Not within requested timeframe    When does patient want to be seen/preferred time: 1-2 days    Comments: Patient's son would like care team call to know how soon patient can be seen or worked in.      Okay to leave a detailed message?: Yes at Cell number on file:    Telephone Information:   Mobile [phone]    or Other phone number:  [phone]    Call taken on 10/27/2022 at 5:21 PM by Acacia Moses

## 2022-10-28 NOTE — TELEPHONE ENCOUNTER
Patient has an appointment scheduled on 11/1. Spoke with patient's son Nigel to see if he would like to schedule a VV with a different provider on 10/31. Nigel states he believes the patient would prefer to see Dr Delaney and will keep the appointment on 11/1. Nigel has no further concerns at this time.

## 2022-10-31 NOTE — TELEPHONE ENCOUNTER
Patient's son calling to reschedule appointment after accidentally canceling appointment with Dr. Delaney for tomorrow (11/1) at 4:30pm. Transferred caller to [facility] to reschedule.    Rekha Swartz RN  10/31/22 4:00 PM  [facility] Nurse Advisor

## 2022-11-01 NOTE — PATIENT INSTRUCTIONS
Right hip CT scan    2.  Get flu shot and covid  vaccine when off prednisone.    3.  See at [facility] as planned.

## 2022-11-01 NOTE — PROGRESS NOTES
1. Mixed hyperlipidemia  Lipids from 9/21 were notable for total cholesterol 210 and an LDL fraction of 129.  She has no known history of coronary artery disease    2. Diastolic CHF, chronic (H)  She is on furosemide and appears well compensated    3. Hip pain, right  She last was seen on 10/17 with severe pain in the right lateral hip area radiating down the leg.  She received a steroid injection for presumed greater trochanteric bursitis with no real improvement in symptoms.  X-ray showed no evidence for fracture.  She subsequently had an ER visit at [place] on 10/27.  At that time pain was felt to be more abdominal.  She is only marginally been able to manage at home with the assistance of family.  She has a history of osteoporosis.  I would be concerned about an occult fragility fracture.  - CT Hip Right w/o Contrast; Future  - Home Care Referral    4. Health care maintenance  Health maintenance and screening issues were reviewed  - REVIEW OF HEALTH MAINTENANCE PROTOCOL ORDERS    5. Senile osteoporosis  She has been on Prolia with last injection on 6/20/2021.  When reviewed in April/2022, she did not want further Prolia or other antiresorptive agents    Patient Instructions   1. Right hip CT scan    2.  Get flu shot and covid  vaccine when off prednisone.    3.  See at [facility] as planned.    4.  Home health evaluation was requested.  She is quite marginal for being able to manage on her own at the present time due to her pain.    Addendum: The home health agency stated they did not have staffing to provide the home health assessment.  They did not offer any referrals to alternative organizations.    Va Cervantes is a 89 year old woman seen with her son, presenting for the following health issues:  Hospital F/U (10/27/22 Right sided abdominal pain - discuss possible home care services )  She last was seen in clinic on 10/17 with 4-day history of pain in the right hip area radiating down the right leg.  She  has well localized tenderness over the greater trochanter and a past history of recurrent trochanteric bursitis.  X-ray at that time showed moderate DJD, but no evidence for fracture.  She received a steroid injection for greater trochanteric bursitis.  Unfortunately, she did not notice any significant improvement.  She subsequently was seen in the emergency room at [place] on 10/27.  Report at that time indicated complaints of pain in the right lower abdomen.  She was felt to be constipated.  She also had a recent orthopedic visit at [facility].  She was treated with prednisone and given hydrocodone.  She is seen with her son and nephew.  She has only marginally been able to manage at home with assistance.    She has a history of osteoporosis.  She previously was on Prolia with last injection on 6/20/2021.  She later stated that she did not wish to resume Prolia or take other antiresorptive agents.    She is on chronic anticoagulation with Eliquis due to atrial fibrillation.  She has a history of diastolic CHF.  She has not had problems with orthopnea, PND, or ankle swelling    HPI         Review of Systems   Constitutional, HEENT, cardiovascular, pulmonary, GI, , musculoskeletal, neuro, skin, endocrine and psych systems are negative, except as otherwise noted.      Objective    /64 (BP Location: Left arm, Patient Position: Sitting, Cuff Size: Adult Regular)   Pulse 86   Wt 54.9 kg (121 lb)   SpO2 98%   BMI 23.24 kg/m    Body mass index is 23.24 kg/m .  Physical Exam   GENERAL APPEARANCE: healthy, alert, moderate distress  with any movement  EYES: Eyes grossly normal to inspection and conjunctivae and sclerae normal  NECK: no adenopathy, no asymmetry, masses, or scars and thyroid normal to palpation  RESP: lungs clear to auscultation - no rales, rhonchi or wheezes  CV: regular rates and rhythm, normal S1 S2, no S3 or S4 and no murmur, click or rub  LYMPHATICS: no cervical adenopathy  ABDOMEN: soft,  nontender, without hepatosplenomegaly or masses and bowel sounds normal  MS: No shortening or rotation of the legs.  No visible ecchymoses.  Pulses intact.  Tender to touch right greater trochanter area  SKIN: no suspicious lesions or rashes  NEURO: Normal strength and tone, mentation intact and speech normal    Office Visit on 07/12/2022   Component Date Value Ref Range Status     WBC Count 07/12/2022 8.0  4.0 - 11.0 10e3/uL Final     RBC Count 07/12/2022 3.97  3.80 - 5.20 10e6/uL Final     Hemoglobin 07/12/2022 11.5 (L)  11.7 - 15.7 g/dL Final     Hematocrit 07/12/2022 36.9  35.0 - 47.0 % Final     MCV 07/12/2022 93  78 - 100 fL Final     MCH 07/12/2022 29.0  26.5 - 33.0 pg Final     MCHC 07/12/2022 31.2 (L)  31.5 - 36.5 g/dL Final     RDW 07/12/2022 13.8  10.0 - 15.0 % Final     Platelet Count 07/12/2022 270  150 - 450 10e3/uL Final     Creatinine 07/12/2022 0.91  0.51 - 0.95 mg/dL Final     Sodium 07/12/2022 134 (L)  136 - 145 mmol/L Final     Potassium 07/12/2022 4.6  3.4 - 5.3 mmol/L Final     Urea Nitrogen 07/12/2022 18.2  8.0 - 23.0 mg/dL Final     Chloride 07/12/2022 98  98 - 107 mmol/L Final     Carbon Dioxide (CO2) 07/12/2022 25  22 - 29 mmol/L Final     Anion Gap 07/12/2022 11  7 - 15 mmol/L Final     Glucose 07/12/2022 84  70 - 99 mg/dL Final     GFR Estimate 07/12/2022 59 (L)  >60 mL/min/1.73m2 Final    Effective December 21, 2021 eGFRcr in adults is calculated using the 2021 CKD-EPI creatinine equation which includes age and gender (Rosi et al., NEJ, DOI: 10.1056/QWKPwy9531107)     Calcium 07/12/2022 9.6  8.2 - 9.6 mg/dL Final     EXAM: XR HIP RIGHT 2-3 VIEWS  LOCATION: [facility]  DATE/TIME: 10/17/2022 2:20 PM     INDICATION:  Trochanteric bursitis of right hip  COMPARISON: None.                                                                      IMPRESSION: Moderate degenerative changes in the right hip joint. Osteopenia. No fractures are evident.    Visit time 30 minutes             Answers for HPI/ROS submitted by the patient on 11/1/2022  If you checked off any problems, how difficult have these problems made it for you to do your work, take care of things at home, or get along with other people?: Not difficult at all  PHQ9 TOTAL SCORE: 4

## 2022-11-14 NOTE — TELEPHONE ENCOUNTER
FYI - Status Update    Who is Calling: patient    Update: Patient is having a lumbar scan on her back on 11/22/2022. They are requesting patient to be off Eliquis for 3 days prior to her appointment. Just want to update provider if any issues please call patient.     Does caller want a call/response back: Yes     Okay to leave a detailed message?: Yes at Home number on file [phone] (home)

## 2022-11-23 NOTE — TELEPHONE ENCOUNTER
Reason for Call:  Other: call     Detailed comments: Helen states she received a call regarding a mass that was found through [facility].Offered an appointment and she refused. She just wants to talk to you.     Phone Number Patient can be reached at: Home number on file [phone] (home)    Best Time: any      Can we leave a detailed message on this number? NO    Call taken on 11/23/2022 at 3:18 PM by Tracy Lopes

## 2022-11-25 NOTE — TELEPHONE ENCOUNTER
Talked with Helen and daughter in-law.    They are waiting a call from [facility]. Pt will call back after they talk with provider from [facility].   No further action needed at this time.

## 2022-11-30 NOTE — TELEPHONE ENCOUNTER
I called Kathy and they are pushing CTs Lumbar from 2019 and 2022.  Request for records faxed to [facility] at [phone]  Amparo Dize LPN

## 2022-11-30 NOTE — TELEPHONE ENCOUNTER
Referred by: Dr Tunde Oviedo   [facility]   [address], [facility] [address]   [facility] [address]   Phone: [phone], Fax: [phone]   Please call to schedule your appointment             Order Questions    Question Answer   Preferred Location: [facility]   Scheduling Instructions: Please call to schedule your appointment   My Clinical Question Is: Concern for a lytic process in the right L5 vertebrae extending into the right neuroforamen which would align with the pts right hip and leg pain.  Recent CT at [facility]

## 2022-12-01 NOTE — TELEPHONE ENCOUNTER
11/22/2022 Lumbar CT and 5/23/2019 Lumbar CT uploaded into PACS. Still waiting for referring provider notes  Amparo Diez LPN

## 2022-12-16 NOTE — TELEPHONE ENCOUNTER
General Call      Reason for Call:  Aleshia From [facility] calling for delay start of care- for tomorrow 12/17.  Unable to reach patient.    Okay to leave a detailed message?: Yes at Other phone number: [phone]

## 2022-12-16 NOTE — TELEPHONE ENCOUNTER
Spoke to Aleshia with [facility]. \Hospital Has a New Name and Outlook.\"".  Patient is currently going to the ED for uncontrolled pain issues.  [facility] will be in contact with us about if the start care tomorrow or the hospital puts in a new referral for Home Health.  Aleshia also state Helen's son may ask she go to a TCU instead as it is hard for him to provide the are she needs at this time.

## 2022-12-28 NOTE — TELEPHONE ENCOUNTER
Novotny, Shelley S Severin-Brown, Darla, LPN  Hi,     Are you able to communicate this to the referring clinic?     Please and thank you!     Adry           Previous Messages     ----- Message -----   From: Figueroa Lino MD   Sent: 12/27/2022   7:46 PM CST   To: Adry FIGUEROA Genny   Subject: RE: Please review                                   I am sure I responded to this.     At 89, she is not a surgical candidate.  Plus she has a very severe osteoporosis.     Her primary care physician needs to order a CT-guided biopsy and an see what this is first.  Then depending on the path, should get an oncology consult or radiation oncology.       ----- Message -----   From: Adry Royal   Sent: 12/27/2022   4:56 PM CST   To: Figueroa Lino MD   Subject: Please review                                     HI,     I believe Amparo emailed you these records earlier this month but I may not have sent to you in [facility].     Thanks,     Adry     Concern for a lytic process in the right L5 vertebrae extending into the right neuroforamen which would align with the pts right hip and leg pain. Recent CT at [facility]

## 2022-12-28 NOTE — TELEPHONE ENCOUNTER
General Call      Reason for Call:  Dr Oviedo - [facility] to discuss Ct of Lumbar spine L5 - lytic process    Would like to discuss with PCP  Pt was referred to [facility] - Dr Kike Lino - pt is not a candidate for surgery -         What are your questions or concerns:  n/a    Date of last appointment with provider: n/a    Okay to leave a detailed message?: Yes at Cell number on file:    Telephone Information:   Mobile [phone]

## 2022-12-28 NOTE — TELEPHONE ENCOUNTER
I called referring provider \Referred by: Dr Tunde Oviedo   [facility]   [address], [facility] [address]   [facility] [address]  New contact number [phone] ans spoke with nurse. I read her Dr. Lino message, she will relay the information to Dr. Oviedo. They requested we call pt and let her know that she is not a surgical candidate. I attempted to call pt but there was no answer and no VM. I will attempt to call again later.  Amparo Diez LPN

## 2022-12-28 NOTE — TELEPHONE ENCOUNTER
I called pt and LM to call be back so I could let her know that Dr. Lino does not think she is a surgical candidate after reviewing her chart.    Amparo Diez LPN

## 2022-12-29 NOTE — TELEPHONE ENCOUNTER
[facility] Call Center    Phone Message    May a detailed message be left on voicemail: yes     Reason for Call: Appointment Intake    Referring Provider Name: Dr. Tunde Oviedo [facility] to [facility]: Dr. Tim Kim    Diagnosis and/or Symptoms: Lumbar spine L5 - lytic process     Action Taken: Message routed to:  [facility] P:  Dr. Tim Kim     Travel Screening: Not Applicable

## 2022-12-30 NOTE — CONFIDENTIAL NOTE
Request to add anti-inflammatory medication noted in TCU    Normally we cannot give orders in the TCU as someone else will be taking care of her there    She is on Eliquis 2.5 mg twice daily.  Her risk of bleeding on Eliquis is roughly 3 %/year    If she takes an anti-inflammatory like Aleve 220 mg twice daily her risk will increase from 3 %/year to approximately 6 %/year.  If she takes it only for a week or 2, it will be proportionately less    It is fine with me to suggest that she take an anti-inflammatory medicine while she is on the Eliquis but that decision will need to be made by the supervising TCU physician

## 2022-12-30 NOTE — TELEPHONE ENCOUNTER
- A call was placed to the patient. Patient did not answer phone so a voicemail was left.      - Call back number to clinic was given and patient was told to call back and ask for Dr. Julio Mena's nurse as soon as possible.

## 2022-12-30 NOTE — TELEPHONE ENCOUNTER
Dr. Navid Patterson,  to pt's daughter Kenisha Patterson. Dr. Patterson provided the following information (no information about patient was given to Dr. Patterson due to him not being on consent to communicate, information documented was provided to me from Dr. Patterson):     Pt is currently at [facility] and from here she will be transitioning to a care facility.     Patient is on eliquis for A fib, Dr. Patterson proposes that patient would benefit from addition of non-steroidal anti-inflammatory medication for her pain control / comfort. Dr. Patterson reports that patient sees a cardiologist at [facility] and is not sure if this cardiologist could adjust eliquis for patient to take NSAID?    Dr. Patterson would like Dr. Delaney's input on this matter and is wondering if there are options for patient.     I informed Dr. Patterson that this request will be sent and that patient's family member on consent to communicate will be contacted with response.     Dr. Patterson requested that Kenisha Mikezeyad (pt daughter on consent to communicate) be called and provided the following cell phone number to call her: [phone].    Dr. Patterson stated that he knows that Dr. Delaney is out of clinic today.

## 2023-01-01 ENCOUNTER — NURSING HOME VISIT (OUTPATIENT)
Dept: GERIATRICS | Facility: CLINIC | Age: 88
End: 2023-01-01
Payer: COMMERCIAL

## 2023-01-01 ENCOUNTER — TELEPHONE (OUTPATIENT)
Dept: GERIATRICS | Facility: CLINIC | Age: 88
End: 2023-01-01
Payer: COMMERCIAL

## 2023-01-01 ENCOUNTER — TELEPHONE (OUTPATIENT)
Dept: GERIATRICS | Facility: CLINIC | Age: 88
End: 2023-01-01

## 2023-01-01 ENCOUNTER — LAB REQUISITION (OUTPATIENT)
Dept: LAB | Facility: CLINIC | Age: 88
End: 2023-01-01
Payer: COMMERCIAL

## 2023-01-01 VITALS
SYSTOLIC BLOOD PRESSURE: 147 MMHG | WEIGHT: 108.8 LBS | HEART RATE: 54 BPM | DIASTOLIC BLOOD PRESSURE: 118 MMHG | OXYGEN SATURATION: 85 % | TEMPERATURE: 97.9 F | BODY MASS INDEX: 20.56 KG/M2 | RESPIRATION RATE: 16 BRPM

## 2023-01-01 VITALS
TEMPERATURE: 97.9 F | DIASTOLIC BLOOD PRESSURE: 70 MMHG | BODY MASS INDEX: 21.75 KG/M2 | HEIGHT: 61 IN | WEIGHT: 115.2 LBS | RESPIRATION RATE: 16 BRPM | HEART RATE: 67 BPM | SYSTOLIC BLOOD PRESSURE: 140 MMHG | OXYGEN SATURATION: 97 %

## 2023-01-01 VITALS
DIASTOLIC BLOOD PRESSURE: 56 MMHG | SYSTOLIC BLOOD PRESSURE: 114 MMHG | HEIGHT: 60 IN | OXYGEN SATURATION: 95 % | WEIGHT: 112 LBS | BODY MASS INDEX: 21.99 KG/M2 | RESPIRATION RATE: 16 BRPM | HEART RATE: 72 BPM | TEMPERATURE: 97.6 F

## 2023-01-01 VITALS
HEIGHT: 60 IN | RESPIRATION RATE: 16 BRPM | DIASTOLIC BLOOD PRESSURE: 56 MMHG | TEMPERATURE: 97.6 F | BODY MASS INDEX: 21.99 KG/M2 | SYSTOLIC BLOOD PRESSURE: 114 MMHG | OXYGEN SATURATION: 95 % | HEART RATE: 72 BPM | WEIGHT: 112 LBS

## 2023-01-01 VITALS
OXYGEN SATURATION: 96 % | HEIGHT: 61 IN | DIASTOLIC BLOOD PRESSURE: 56 MMHG | RESPIRATION RATE: 18 BRPM | BODY MASS INDEX: 20.86 KG/M2 | WEIGHT: 110.5 LBS | HEART RATE: 72 BPM | SYSTOLIC BLOOD PRESSURE: 112 MMHG

## 2023-01-01 DIAGNOSIS — M54.16 LUMBAR RADICULOPATHY: Primary | ICD-10-CM

## 2023-01-01 DIAGNOSIS — R53.82 CHRONIC FATIGUE, UNSPECIFIED: ICD-10-CM

## 2023-01-01 DIAGNOSIS — I10 ESSENTIAL HYPERTENSION: Primary | Chronic | ICD-10-CM

## 2023-01-01 DIAGNOSIS — M54.6 THORACIC BACK PAIN, UNSPECIFIED BACK PAIN LATERALITY, UNSPECIFIED CHRONICITY: Primary | ICD-10-CM

## 2023-01-01 DIAGNOSIS — N18.30 STAGE 3 CHRONIC KIDNEY DISEASE, UNSPECIFIED WHETHER STAGE 3A OR 3B CKD (H): ICD-10-CM

## 2023-01-01 DIAGNOSIS — R09.02 HYPOXIA: ICD-10-CM

## 2023-01-01 DIAGNOSIS — K59.01 SLOW TRANSIT CONSTIPATION: ICD-10-CM

## 2023-01-01 DIAGNOSIS — R09.02 HYPOXEMIA: ICD-10-CM

## 2023-01-01 DIAGNOSIS — Z86.73 HISTORY OF CVA (CEREBROVASCULAR ACCIDENT): ICD-10-CM

## 2023-01-01 DIAGNOSIS — I10 ESSENTIAL HYPERTENSION: Chronic | ICD-10-CM

## 2023-01-01 DIAGNOSIS — I50.32 DIASTOLIC CHF, CHRONIC (H): Primary | ICD-10-CM

## 2023-01-01 DIAGNOSIS — Z51.5 END OF LIFE CARE: Primary | ICD-10-CM

## 2023-01-01 DIAGNOSIS — I48.20 CHRONIC ATRIAL FIBRILLATION (H): ICD-10-CM

## 2023-01-01 DIAGNOSIS — R93.7 ABNORMAL CT SCAN, LUMBAR SPINE: ICD-10-CM

## 2023-01-01 DIAGNOSIS — K21.9 GASTROESOPHAGEAL REFLUX DISEASE WITHOUT ESOPHAGITIS: ICD-10-CM

## 2023-01-01 DIAGNOSIS — M79.605 LEFT LEG PAIN: ICD-10-CM

## 2023-01-01 DIAGNOSIS — I50.32 CHRONIC HEART FAILURE WITH PRESERVED EJECTION FRACTION (HFPEF) (H): ICD-10-CM

## 2023-01-01 DIAGNOSIS — R62.7 ADULT FAILURE TO THRIVE: Primary | ICD-10-CM

## 2023-01-01 DIAGNOSIS — Z95.810 CARDIAC DEFIBRILLATOR IN SITU: ICD-10-CM

## 2023-01-01 DIAGNOSIS — E78.5 HYPERLIPIDEMIA, UNSPECIFIED HYPERLIPIDEMIA TYPE: ICD-10-CM

## 2023-01-01 DIAGNOSIS — R41.89 COGNITIVE IMPAIRMENT: ICD-10-CM

## 2023-01-01 DIAGNOSIS — M54.16 LUMBAR RADICULOPATHY: ICD-10-CM

## 2023-01-01 DIAGNOSIS — I12.9 BENIGN HYPERTENSIVE KIDNEY DISEASE WITH CHRONIC KIDNEY DISEASE STAGE I THROUGH STAGE IV, OR UNSPECIFIED: ICD-10-CM

## 2023-01-01 DIAGNOSIS — R13.10 DYSPHAGIA, UNSPECIFIED TYPE: ICD-10-CM

## 2023-01-01 LAB
ANION GAP SERPL CALCULATED.3IONS-SCNC: 14 MMOL/L (ref 7–15)
BASOPHILS # BLD AUTO: 0 10E3/UL (ref 0–0.2)
BASOPHILS NFR BLD AUTO: 0 %
BUN SERPL-MCNC: 69.7 MG/DL (ref 8–23)
CALCIUM SERPL-MCNC: 10.3 MG/DL (ref 8.2–9.6)
CHLORIDE SERPL-SCNC: 105 MMOL/L (ref 98–107)
CREAT SERPL-MCNC: 3.97 MG/DL (ref 0.51–0.95)
DEPRECATED HCO3 PLAS-SCNC: 14 MMOL/L (ref 22–29)
EOSINOPHIL # BLD AUTO: 0.1 10E3/UL (ref 0–0.7)
EOSINOPHIL NFR BLD AUTO: 1 %
ERYTHROCYTE [DISTWIDTH] IN BLOOD BY AUTOMATED COUNT: 14.5 % (ref 10–15)
GFR SERPL CREATININE-BSD FRML MDRD: 10 ML/MIN/1.73M2
GLUCOSE SERPL-MCNC: 103 MG/DL (ref 70–99)
HCT VFR BLD AUTO: 40.6 % (ref 35–47)
HGB BLD-MCNC: 12.4 G/DL (ref 11.7–15.7)
IMM GRANULOCYTES # BLD: 0.1 10E3/UL
IMM GRANULOCYTES NFR BLD: 1 %
LYMPHOCYTES # BLD AUTO: 1.4 10E3/UL (ref 0.8–5.3)
LYMPHOCYTES NFR BLD AUTO: 12 %
MCH RBC QN AUTO: 27.3 PG (ref 26.5–33)
MCHC RBC AUTO-ENTMCNC: 30.5 G/DL (ref 31.5–36.5)
MCV RBC AUTO: 89 FL (ref 78–100)
MONOCYTES # BLD AUTO: 1 10E3/UL (ref 0–1.3)
MONOCYTES NFR BLD AUTO: 9 %
NEUTROPHILS # BLD AUTO: 9.4 10E3/UL (ref 1.6–8.3)
NEUTROPHILS NFR BLD AUTO: 77 %
NRBC # BLD AUTO: 0 10E3/UL
NRBC BLD AUTO-RTO: 0 /100
PLATELET # BLD AUTO: 361 10E3/UL (ref 150–450)
POTASSIUM SERPL-SCNC: 6.2 MMOL/L (ref 3.4–5.3)
RBC # BLD AUTO: 4.54 10E6/UL (ref 3.8–5.2)
SODIUM SERPL-SCNC: 133 MMOL/L (ref 136–145)
WBC # BLD AUTO: 12 10E3/UL (ref 4–11)

## 2023-01-01 PROCEDURE — 99309 SBSQ NF CARE MODERATE MDM 30: CPT | Performed by: FAMILY MEDICINE

## 2023-01-01 PROCEDURE — 99310 SBSQ NF CARE HIGH MDM 45: CPT | Performed by: FAMILY MEDICINE

## 2023-01-01 PROCEDURE — 36415 COLL VENOUS BLD VENIPUNCTURE: CPT | Mod: ORL | Performed by: INTERNAL MEDICINE

## 2023-01-01 PROCEDURE — P9604 ONE-WAY ALLOW PRORATED TRIP: HCPCS | Mod: ORL | Performed by: INTERNAL MEDICINE

## 2023-01-01 PROCEDURE — 80048 BASIC METABOLIC PNL TOTAL CA: CPT | Mod: ORL | Performed by: INTERNAL MEDICINE

## 2023-01-01 PROCEDURE — 99309 SBSQ NF CARE MODERATE MDM 30: CPT | Performed by: INTERNAL MEDICINE

## 2023-01-01 PROCEDURE — 85025 COMPLETE CBC W/AUTO DIFF WBC: CPT | Mod: ORL | Performed by: INTERNAL MEDICINE

## 2023-01-01 RX ORDER — LORAZEPAM 0.5 MG/1
0.5 TABLET ORAL EVERY 4 HOURS PRN
Qty: 15 TABLET | Refills: 0 | Status: SHIPPED | OUTPATIENT
Start: 2023-01-01

## 2023-01-01 RX ORDER — LORAZEPAM 2 MG/ML
0.5 CONCENTRATE ORAL EVERY 4 HOURS PRN
COMMUNITY
End: 2023-01-01

## 2023-01-01 RX ORDER — POTASSIUM CHLORIDE 750 MG/1
10 CAPSULE, EXTENDED RELEASE ORAL DAILY
COMMUNITY
End: 2023-01-01

## 2023-01-01 RX ORDER — MORPHINE SULFATE 100 MG/5ML
2.5 SOLUTION ORAL
Qty: 30 ML | Refills: 0 | Status: CANCELLED | OUTPATIENT
Start: 2023-01-01

## 2023-01-01 RX ORDER — LORAZEPAM 2 MG/ML
0.5 CONCENTRATE ORAL EVERY 4 HOURS PRN
Qty: 30 ML | Refills: 0 | Status: CANCELLED | OUTPATIENT
Start: 2023-01-01

## 2023-01-01 RX ORDER — LIDOCAINE 50 MG/G
OINTMENT TOPICAL PRN
COMMUNITY

## 2023-01-01 RX ORDER — NORTRIPTYLINE HCL 10 MG
10 CAPSULE ORAL
COMMUNITY
End: 2023-01-01

## 2023-01-01 RX ORDER — MORPHINE SULFATE 100 MG/5ML
2.5 SOLUTION ORAL
COMMUNITY
End: 2023-01-01

## 2023-01-01 RX ORDER — BIOTIN 1 MG
1000 TABLET ORAL DAILY
COMMUNITY

## 2023-01-01 RX ORDER — POLYETHYLENE GLYCOL 3350 17 G/17G
17 POWDER, FOR SOLUTION ORAL DAILY
COMMUNITY

## 2023-01-01 RX ORDER — SENNOSIDES 8.6 MG
1 TABLET ORAL 2 TIMES DAILY PRN
COMMUNITY

## 2023-01-01 RX ORDER — HYDROMORPHONE HYDROCHLORIDE 1 MG/ML
0.5 SOLUTION ORAL
Qty: 12 ML | Refills: 0 | Status: SHIPPED | OUTPATIENT
Start: 2023-01-01

## 2023-01-01 RX ORDER — NORTRIPTYLINE HCL 10 MG
10 CAPSULE ORAL
Qty: 31 CAPSULE | Refills: 3 | Status: SHIPPED | OUTPATIENT
Start: 2023-01-01

## 2023-01-01 RX ORDER — ACETAMINOPHEN 325 MG/1
650 TABLET ORAL EVERY 4 HOURS PRN
COMMUNITY

## 2023-01-01 RX ORDER — HYDROMORPHONE HYDROCHLORIDE 2 MG/1
0.5 TABLET ORAL EVERY 6 HOURS PRN
COMMUNITY
End: 2023-01-01

## 2023-01-02 NOTE — TELEPHONE ENCOUNTER
Spoke with patients daughter Kenisha who stated she already spoke to Dr. Delaney regarding her mothers eliquis and ibuprofen and has no further questions.

## 2023-01-11 PROBLEM — I47.29 POLYMORPHIC VENTRICULAR TACHYCARDIA (H): Status: ACTIVE | Noted: 2019-03-01

## 2023-01-11 PROBLEM — M54.16 LUMBAR RADICULOPATHY: Status: ACTIVE | Noted: 2023-01-01

## 2023-01-11 PROBLEM — R55 SYNCOPE AND COLLAPSE: Status: ACTIVE | Noted: 2019-02-27

## 2023-01-11 PROBLEM — R93.7 ABNORMAL CT SCAN, LUMBAR SPINE: Status: ACTIVE | Noted: 2022-01-01

## 2023-01-11 PROBLEM — D17.1 LIPOMA OF BACK: Status: ACTIVE | Noted: 2022-01-01

## 2023-01-11 PROBLEM — R94.31 PROLONGED Q-T INTERVAL ON ECG: Status: ACTIVE | Noted: 2019-02-28

## 2023-01-11 PROBLEM — Z86.73 HISTORY OF CEREBROVASCULAR ACCIDENT: Status: ACTIVE | Noted: 2021-03-22

## 2023-01-11 PROBLEM — I10 ESSENTIAL HYPERTENSION: Chronic | Status: ACTIVE | Noted: 2019-02-27

## 2023-01-11 PROBLEM — R68.89 EXERCISE INTOLERANCE: Status: ACTIVE | Noted: 2020-04-01

## 2023-01-11 PROBLEM — E78.5 HYPERLIPIDEMIA: Status: ACTIVE | Noted: 2019-02-27

## 2023-01-11 PROBLEM — M54.9 BACKACHE: Status: ACTIVE | Noted: 2022-01-01

## 2023-01-11 PROBLEM — I48.21 PERMANENT ATRIAL FIBRILLATION (H): Status: ACTIVE | Noted: 2019-02-27

## 2023-01-11 PROBLEM — K21.9 ESOPHAGEAL REFLUX: Status: ACTIVE | Noted: 2019-02-27

## 2023-01-11 PROBLEM — I63.9 ACUTE CVA (CEREBROVASCULAR ACCIDENT) (H): Status: ACTIVE | Noted: 2021-03-05

## 2023-01-11 PROBLEM — Z95.810 CARDIAC DEFIBRILLATOR IN SITU: Status: ACTIVE | Noted: 2019-03-12

## 2023-01-11 PROBLEM — I44.2 CHB (COMPLETE HEART BLOCK) (H): Status: ACTIVE | Noted: 2019-03-12

## 2023-01-11 PROBLEM — M79.605 LEFT LEG PAIN: Status: ACTIVE | Noted: 2023-01-01

## 2023-01-11 NOTE — PROGRESS NOTES
[facility] GERIATRIC SERVICES    Facility:   [facility] () [[id number]]   Code Status: DNR/DNI      HPI:   Helen is a 89 year old female who is currently now residing in [facility] and who I was asked to visit with secondary to admit into long-term care as well as a meet and greet.  She was hospitalized December 16, 2022 through December 19, 2022 secondary to lumbar radiculopathy.  She does have a history of CVA, hypertension, hyperlipidemia, heart failure, hiatal hernia, GERD.  Prior to that she was admitted to the hospital November 30, 2022 through December 14, 2022 secondary chronic back pain.  Was seen by [facility] in November 2022 and a lumbar spine did show a L5 lesion concerning for metastatic lesion she underwent the work-up including an MRI and biopsy.  The biopsy did show chronic inflammation, fibrosis as well as other changes consistent with healing fracture but no malignancy detected.  This is all the information I currently have received with this admit.  She does have some cognitive impairment however did have a chance to talk to her son Aaron today.  She has been normotensive and afebrile and also on room air and her only weight 114 pounds.  According to nursing notes she has been doing fine.  We talked about her lumbar radiculopathy as well as her overall pain and she is on gabapentin 100 mg 4 times daily along with lidocaine patch and she does feel that to be helpful.  We did talk about Tylenol but she had stated in the past that has not been terribly helpful.  Has not required any Dilaudid as needed so that will be discontinued.  Also due to potential side effects perhaps we could utilize other medications and lieu of that if she needed them.  She does talk as well about her ability and sometimes inability to ambulate.  Regarding her ability and inability to ambulate she does try to take in consideration safety mechanisms as well as how far she has to ambulate and according to her son  though she did a pretty good job th family pictures.  We talked about her front wheel walker as well as safety mechanisms.  We did discuss her ambulating with a walker as well as planning out and strategizing her ambulation in the room as well as in the hallway.  She does not have any compromise CMS to lower extremities during her visit today.  She does have Eliquis secondary to A. fib as well as a pacemaker.  Her appetite is good.  She denies any heartburn or reflux.  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  Her  is now  they did travel.  She also has children and grandchildren as well as a wonderful homemade blanket with pictures of family members.    Past Medical History:  Past Medical History:   Diagnosis Date     Cellulitis of third toe of right foot      Dermatitis 2017     Diverticulosis 1998     Edema 2014     Esophageal reflux 2006     History of CVA (cerebrovascular accident) 3/22/2021     Hyperlipidemia 2007     Hypertension 2008     Osteoarthritis 2005     Paroxysmal atrial fibrillation (H) 2011     Persistent atrial fibrillation (H) 2015     Trochanteric bursitis     osteoarthritis     Venous insufficiency 2016           Surgical History:  Past Surgical History:   Procedure Laterality Date     EYE SURGERY      bilateral cataract surgery     FRACTURE SURGERY      left toe     JOINT REPLACEMENT       OTHER SURGICAL HISTORY  2019    Single chamber ICDFor complete heart block and torsade de point arrhythmia     [place] CARDIOVERSION ELECTIVE ARRHYTHMIA EXTERNAL      Description: Elective Cardioversion External;  Recorded: 2014;  Comments: 14     [place] EXCISE HAND/FOOT NEUROMA      Description: Excision Of Neuroma Of Right Foot;  Proc Date: 2005;     TONSILLECTOMY       ZZC RECONSTR TOTAL SHOULDER IMPLANT      Description: Shoulder Arthroplasty Total Shoulder Replacement;  Proc Date: 2011;  Comments: REVERSE TOTAL  SHOULDER       Family History:   Family History   Problem Relation Age of Onset     Heart Disease Mother      No Known Problems Father      Chronic Obstructive Pulmonary Disease Sister      Kidney failure Brother      Liver Disease Brother      No Known Problems Son      No Known Problems Daughter        Social History:    Social History     Socioeconomic History     Marital status:      Spouse name: None     Number of children: 2     Years of education: None     Highest education level: None   Tobacco Use     Smoking status: Never     Smokeless tobacco: Never   Substance and Sexual Activity     Alcohol use: No     Drug use: Yes     Types: Nitrous oxide     Sexual activity: Not Currently     Partners: Male   Social History Narrative    She is .  She lives alone independently.  Her son Nigel is heavily involved in her care.  She has grandchildren and great-grandchildren.        REVIEW OF SYSTEM:  She currently denies any new symptoms of fever cough or cold allergies postnasal drip shortness of breath dyspnea wheezing chest pain dizziness vertigo nausea vomiting diarrhea dysuria frequency urgency headaches.  She does have a history of lumbar radiculopathy, pacemaker, CHF, GERD, hypertension, hyperlipidemia and CVA.    PHYSICAL EXAM:   Pleasant female in no acute distress.  Head is normocephalic.  Conjunctiva is pink and sclera is clear.  Glasses.  Clear speech.  Neck is supple without adenopathy.  Lung sounds are clear throughout.  Cardiovascular S1-S2 regular rate and rhythm and no lower extremity edema.  Pacemaker left upper thorax.  Gastrointestinal thinly built nontender nondistended.  Musculoskeletal does complain of chronic back pain.  We did talk about ambulating as well as being on her lidocaine patch and Neurontin.  Psychiatric: Pleasant affect.    Vitals:    01/11/23 1017   BP: (!) 140/70   Pulse: 67   Resp: 16   Temp: 97.9  F (36.6  C)   SpO2: 97%   Weight: 52.3 kg (115 lb 3.2 oz)   Height:  "1.537 m (5' 0.5\")         Medication List:  Current Outpatient Medications   Medication Sig     acetaminophen (TYLENOL) 325 MG tablet Take 650 mg by mouth every 4 hours as needed for mild pain     apixaban ANTICOAGULANT (ELIQUIS) 2.5 mg Tab tablet [APIXABAN ANTICOAGULANT (ELIQUIS) 2.5 MG TAB TABLET] Take 1 tablet (2.5 mg total) by mouth 2 (two) times a day.     biotin 5,000 mcg TbDL [BIOTIN 5,000 MCG TBDL] Take by mouth.     Boswellia-Glucosamine-Vit D (OSTEO BI-FLEX ONE PER DAY PO) Give 1 tablet by mouth one time a  day for GI upset     cholecalciferol, vitamin D3, 1,000 unit tablet Take 2,000 Units by mouth daily     cyanocobalamin 1000 MCG tablet [CYANOCOBALAMIN 1000 MCG TABLET] Take 1 tablet (1,000 mcg total) by mouth daily.     furosemide (LASIX) 20 MG tablet One half tab daily     gabapentin (NEURONTIN) 100 MG capsule Take 1 capsule (100 mg) by mouth 3 times daily (Patient taking differently: Take 100 mg by mouth 4 times daily)     lidocaine (XYLOCAINE) 5 % external ointment Apply topically as needed for moderate pain (4-6) Apply to Left lateral hip/thigh  topically two times a day for Pain     losartan (COZAAR) 25 MG tablet [LOSARTAN (COZAAR) 25 MG TABLET] Take 1 tablet (25 mg total) by mouth daily.     metoprolol tartrate (LOPRESSOR) 25 MG tablet [METOPROLOL TARTRATE (LOPRESSOR) 25 MG TABLET] Take 1 tablet (25 mg total) by mouth 2 (two) times a day. (Patient taking differently: Take 25 mg by mouth daily)     polyethylene glycol (MIRALAX) 17 g packet Take 17 g by mouth daily     potassium chloride ER (MICRO-K) 10 MEQ CR capsule Take 10 mEq by mouth daily     sennosides (SENOKOT) 8.6 MG tablet Take 1 tablet by mouth daily     Current Facility-Administered Medications   Medication     lidocaine 1 % injection 5 mL        Labs:  Recent Labs   Lab Test 09/28/21  1342 02/11/20  1555   CHOL 210* 226*   HDL 62 56    150*   TRIG 97 100     Last Comprehensive Metabolic Panel:  Sodium   Date Value Ref Range " Status   07/12/2022 134 (L) 136 - 145 mmol/L Final     Potassium   Date Value Ref Range Status   07/12/2022 4.6 3.4 - 5.3 mmol/L Final   05/09/2022 4.2 3.5 - 5.0 mmol/L Final     Chloride   Date Value Ref Range Status   07/12/2022 98 98 - 107 mmol/L Final   05/09/2022 105 98 - 107 mmol/L Final     Carbon Dioxide (CO2)   Date Value Ref Range Status   07/12/2022 25 22 - 29 mmol/L Final   05/09/2022 25 22 - 31 mmol/L Final     Anion Gap   Date Value Ref Range Status   07/12/2022 11 7 - 15 mmol/L Final   05/09/2022 11 5 - 18 mmol/L Final     Glucose   Date Value Ref Range Status   07/12/2022 84 70 - 99 mg/dL Final   05/09/2022 72 70 - 125 mg/dL Final     Urea Nitrogen   Date Value Ref Range Status   07/12/2022 18.2 8.0 - 23.0 mg/dL Final   05/09/2022 17 8 - 28 mg/dL Final     Creatinine   Date Value Ref Range Status   07/12/2022 0.91 0.51 - 0.95 mg/dL Final     GFR Estimate   Date Value Ref Range Status   07/12/2022 59 (L) >60 mL/min/1.73m2 Final     Comment:     Effective December 21, 2021 eGFRcr in adults is calculated using the 2021 CKD-EPI creatinine equation which includes age and gender (Rosi valencia al., NE, DOI: 10.1056/VKMUud5196827)   04/20/2021 53 (L) >60 mL/min/1.73m2 Final     Calcium   Date Value Ref Range Status   07/12/2022 9.6 8.2 - 9.6 mg/dL Final     Bilirubin Total   Date Value Ref Range Status   02/11/2020 0.5 0.0 - 1.0 mg/dL Final     Alkaline Phosphatase   Date Value Ref Range Status   02/11/2020 62 45 - 120 U/L Final     ALT   Date Value Ref Range Status   09/28/2021 15 0 - 45 U/L Final     AST   Date Value Ref Range Status   02/11/2020 20 0 - 40 U/L Final             CBC RESULTS: Recent Labs   Lab Test 07/12/22  1419   WBC 8.0   RBC 3.97   HGB 11.5*   HCT 36.9   MCV 93   MCH 29.0   MCHC 31.2*   RDW 13.8            Assessment:   (I50.32) Diastolic CHF, chronic (H)  (primary encounter diagnosis)  Comment: Currently doing well  Plan: Is on furosemide 10 mg    (I10) Essential  hypertension  Comment: Stable  Plan: Continue with losartan 25 mg and Lopressor 25 mg    (Z95.810) Cardiac defibrillator in situ  Comment: Is on Eliquis 2.5 mg  Plan: Continue to monitor    (M54.16) Lumbar radiculopathy  Comment: On gabapentin 100 mg 4 times daily and lidocaine patch  Plan: Continue to monitor.      PLAN:    We will try to obtain other further medical records.  Looks like she could be due for an update of her laboratory studies as listed above.  Otherwise she seems to be doing very well.  Had a pleasant visit with her today.  We did talk again about her ambulation as well as being very careful as well as strategizing her ambulation.  She is not having any pain today.  We did talk about other modalities of treatment as well.  Also had a wonderful conversation with her son today.    For documentation purposes total visit 45 minutes which over 50% was spent with the patient going over her chronic medical conditions, medications, nutrition, ambulation and future care and long-term care.  Remainder the time was spent talking with her son Aaron.      Electronically signed by: Aaron Lopes NP

## 2023-01-11 NOTE — LETTER
1/11/2023        RE: Helen Beckham  [address] [place] [address]        [facility] GERIATRIC SERVICES    Facility:   [facility] () [[id number]]   Code Status: DNR/DNI      HPI:   Helen is a 89 year old female who is currently now residing in [facility] and who I was asked to visit with secondary to admit into long-term care as well as a meet and greet.  She was hospitalized December 16, 2022 through December 19, 2022 secondary to lumbar radiculopathy.  She does have a history of CVA, hypertension, hyperlipidemia, heart failure, hiatal hernia, GERD.  Prior to that she was admitted to the hospital November 30, 2022 through December 14, 2022 secondary chronic back pain.  Was seen by [facility] in November 2022 and a lumbar spine did show a L5 lesion concerning for metastatic lesion she underwent the work-up including an MRI and biopsy.  The biopsy did show chronic inflammation, fibrosis as well as other changes consistent with healing fracture but no malignancy detected.  This is all the information I currently have received with this admit.  She does have some cognitive impairment however did have a chance to talk to her son Aaron today.  She has been normotensive and afebrile and also on room air and her only weight 114 pounds.  According to nursing notes she has been doing fine.  We talked about her lumbar radiculopathy as well as her overall pain and she is on gabapentin 100 mg 4 times daily along with lidocaine patch and she does feel that to be helpful.  We did talk about Tylenol but she had stated in the past that has not been terribly helpful.  Has not required any Dilaudid as needed so that will be discontinued.  Also due to potential side effects perhaps we could utilize other medications and lieu of that if she needed them.  She does talk as well about her ability and sometimes inability to ambulate.  Regarding her ability and inability to ambulate she does try to take in  consideration safety mechanisms as well as how far she has to ambulate and according to her son though she did a pretty good job th family pictures.  We talked about her front wheel walker as well as safety mechanisms.  We did discuss her ambulating with a walker as well as planning out and strategizing her ambulation in the room as well as in the hallway.  She does not have any compromise CMS to lower extremities during her visit today.  She does have Eliquis secondary to A. fib as well as a pacemaker.  Her appetite is good.  She denies any heartburn or reflux.  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  Her  is now  they did travel.  She also has children and grandchildren as well as a wonderful homemade blanket with pictures of family members.    Past Medical History:  Past Medical History:   Diagnosis Date     Cellulitis of third toe of right foot      Dermatitis 2017     Diverticulosis 1998     Edema 2014     Esophageal reflux 2006     History of CVA (cerebrovascular accident) 3/22/2021     Hyperlipidemia 2007     Hypertension 2008     Osteoarthritis 2005     Paroxysmal atrial fibrillation (H) 2011     Persistent atrial fibrillation (H) 2015     Trochanteric bursitis     osteoarthritis     Venous insufficiency 2016           Surgical History:  Past Surgical History:   Procedure Laterality Date     EYE SURGERY      bilateral cataract surgery     FRACTURE SURGERY      left toe     JOINT REPLACEMENT       OTHER SURGICAL HISTORY  2019    Single chamber ICDFor complete heart block and torsade de point arrhythmia     [place] CARDIOVERSION ELECTIVE ARRHYTHMIA EXTERNAL      Description: Elective Cardioversion External;  Recorded: 2014;  Comments: 14     [place] EXCISE HAND/FOOT NEUROMA      Description: Excision Of Neuroma Of Right Foot;  Proc Date: 2005;     TONSILLECTOMY       ZZC RECONSTR TOTAL SHOULDER IMPLANT      Description: Shoulder  Arthroplasty Total Shoulder Replacement;  Proc Date: 07/13/2011;  Comments: REVERSE TOTAL SHOULDER       Family History:   Family History   Problem Relation Age of Onset     Heart Disease Mother      No Known Problems Father      Chronic Obstructive Pulmonary Disease Sister      Kidney failure Brother      Liver Disease Brother      No Known Problems Son      No Known Problems Daughter        Social History:    Social History     Socioeconomic History     Marital status:      Spouse name: None     Number of children: 2     Years of education: None     Highest education level: None   Tobacco Use     Smoking status: Never     Smokeless tobacco: Never   Substance and Sexual Activity     Alcohol use: No     Drug use: Yes     Types: Nitrous oxide     Sexual activity: Not Currently     Partners: Male   Social History Narrative    She is .  She lives alone independently.  Her son Nigel is heavily involved in her care.  She has grandchildren and great-grandchildren.        REVIEW OF SYSTEM:  She currently denies any new symptoms of fever cough or cold allergies postnasal drip shortness of breath dyspnea wheezing chest pain dizziness vertigo nausea vomiting diarrhea dysuria frequency urgency headaches.  She does have a history of lumbar radiculopathy, pacemaker, CHF, GERD, hypertension, hyperlipidemia and CVA.    PHYSICAL EXAM:   Pleasant female in no acute distress.  Head is normocephalic.  Conjunctiva is pink and sclera is clear.  Glasses.  Clear speech.  Neck is supple without adenopathy.  Lung sounds are clear throughout.  Cardiovascular S1-S2 regular rate and rhythm and no lower extremity edema.  Pacemaker left upper thorax.  Gastrointestinal thinly built nontender nondistended.  Musculoskeletal does complain of chronic back pain.  We did talk about ambulating as well as being on her lidocaine patch and Neurontin.  Psychiatric: Pleasant affect.    Vitals:    01/11/23 1017   BP: (!) 140/70   Pulse: 67  "  Resp: 16   Temp: 97.9  F (36.6  C)   SpO2: 97%   Weight: 52.3 kg (115 lb 3.2 oz)   Height: 1.537 m (5' 0.5\")         Medication List:  Current Outpatient Medications   Medication Sig     acetaminophen (TYLENOL) 325 MG tablet Take 650 mg by mouth every 4 hours as needed for mild pain     apixaban ANTICOAGULANT (ELIQUIS) 2.5 mg Tab tablet [APIXABAN ANTICOAGULANT (ELIQUIS) 2.5 MG TAB TABLET] Take 1 tablet (2.5 mg total) by mouth 2 (two) times a day.     biotin 5,000 mcg TbDL [BIOTIN 5,000 MCG TBDL] Take by mouth.     Boswellia-Glucosamine-Vit D (OSTEO BI-FLEX ONE PER DAY PO) Give 1 tablet by mouth one time a  day for GI upset     cholecalciferol, vitamin D3, 1,000 unit tablet Take 2,000 Units by mouth daily     cyanocobalamin 1000 MCG tablet [CYANOCOBALAMIN 1000 MCG TABLET] Take 1 tablet (1,000 mcg total) by mouth daily.     furosemide (LASIX) 20 MG tablet One half tab daily     gabapentin (NEURONTIN) 100 MG capsule Take 1 capsule (100 mg) by mouth 3 times daily (Patient taking differently: Take 100 mg by mouth 4 times daily)     lidocaine (XYLOCAINE) 5 % external ointment Apply topically as needed for moderate pain (4-6) Apply to Left lateral hip/thigh  topically two times a day for Pain     losartan (COZAAR) 25 MG tablet [LOSARTAN (COZAAR) 25 MG TABLET] Take 1 tablet (25 mg total) by mouth daily.     metoprolol tartrate (LOPRESSOR) 25 MG tablet [METOPROLOL TARTRATE (LOPRESSOR) 25 MG TABLET] Take 1 tablet (25 mg total) by mouth 2 (two) times a day. (Patient taking differently: Take 25 mg by mouth daily)     polyethylene glycol (MIRALAX) 17 g packet Take 17 g by mouth daily     potassium chloride ER (MICRO-K) 10 MEQ CR capsule Take 10 mEq by mouth daily     sennosides (SENOKOT) 8.6 MG tablet Take 1 tablet by mouth daily     Current Facility-Administered Medications   Medication     lidocaine 1 % injection 5 mL        Labs:  Recent Labs   Lab Test 09/28/21  1342 02/11/20  1555   CHOL 210* 226*   HDL 62 56    " 150*   TRIG 97 100     Last Comprehensive Metabolic Panel:  Sodium   Date Value Ref Range Status   07/12/2022 134 (L) 136 - 145 mmol/L Final     Potassium   Date Value Ref Range Status   07/12/2022 4.6 3.4 - 5.3 mmol/L Final   05/09/2022 4.2 3.5 - 5.0 mmol/L Final     Chloride   Date Value Ref Range Status   07/12/2022 98 98 - 107 mmol/L Final   05/09/2022 105 98 - 107 mmol/L Final     Carbon Dioxide (CO2)   Date Value Ref Range Status   07/12/2022 25 22 - 29 mmol/L Final   05/09/2022 25 22 - 31 mmol/L Final     Anion Gap   Date Value Ref Range Status   07/12/2022 11 7 - 15 mmol/L Final   05/09/2022 11 5 - 18 mmol/L Final     Glucose   Date Value Ref Range Status   07/12/2022 84 70 - 99 mg/dL Final   05/09/2022 72 70 - 125 mg/dL Final     Urea Nitrogen   Date Value Ref Range Status   07/12/2022 18.2 8.0 - 23.0 mg/dL Final   05/09/2022 17 8 - 28 mg/dL Final     Creatinine   Date Value Ref Range Status   07/12/2022 0.91 0.51 - 0.95 mg/dL Final     GFR Estimate   Date Value Ref Range Status   07/12/2022 59 (L) >60 mL/min/1.73m2 Final     Comment:     Effective December 21, 2021 eGFRcr in adults is calculated using the 2021 CKD-EPI creatinine equation which includes age and gender (Rosi et al., NEJ, DOI: 10.1056/FFZTwx2246095)   04/20/2021 53 (L) >60 mL/min/1.73m2 Final     Calcium   Date Value Ref Range Status   07/12/2022 9.6 8.2 - 9.6 mg/dL Final     Bilirubin Total   Date Value Ref Range Status   02/11/2020 0.5 0.0 - 1.0 mg/dL Final     Alkaline Phosphatase   Date Value Ref Range Status   02/11/2020 62 45 - 120 U/L Final     ALT   Date Value Ref Range Status   09/28/2021 15 0 - 45 U/L Final     AST   Date Value Ref Range Status   02/11/2020 20 0 - 40 U/L Final             CBC RESULTS: Recent Labs   Lab Test 07/12/22  1419   WBC 8.0   RBC 3.97   HGB 11.5*   HCT 36.9   MCV 93   MCH 29.0   MCHC 31.2*   RDW 13.8            Assessment:   (I50.32) Diastolic CHF, chronic (H)  (primary encounter  diagnosis)  Comment: Currently doing well  Plan: Is on furosemide 10 mg    (I10) Essential hypertension  Comment: Stable  Plan: Continue with losartan 25 mg and Lopressor 25 mg    (Z95.810) Cardiac defibrillator in situ  Comment: Is on Eliquis 2.5 mg  Plan: Continue to monitor    (M54.16) Lumbar radiculopathy  Comment: On gabapentin 100 mg 4 times daily and lidocaine patch  Plan: Continue to monitor.      PLAN:    We will try to obtain other further medical records.  Looks like she could be due for an update of her laboratory studies as listed above.  Otherwise she seems to be doing very well.  Had a pleasant visit with her today.  We did talk again about her ambulation as well as being very careful as well as strategizing her ambulation.  She is not having any pain today.  We did talk about other modalities of treatment as well.  Also had a wonderful conversation with her son today.    For documentation purposes total visit 45 minutes which over 50% was spent with the patient going over her chronic medical conditions, medications, nutrition, ambulation and future care and long-term care.  Remainder the time was spent talking with her son Aaron.      Electronically signed by: Aaron Lopes NP          Sincerely,        Aaron Lopes NP

## 2023-01-13 NOTE — TELEPHONE ENCOUNTER
[facility] Geriatrics Triage Nurse Telephone Encounter    Provider: OZIEL Velazquez  Facility: [facility]  Facility Type:  [facility]    Caller: Mercedes  Call Back Number: [phone]    Allergies:    Allergies   Allergen Reactions     Atorvastatin Unknown     Bee Sting Kit [Bee Venom] Unknown     Codeine Unknown     Iodinated Contrast Media [Diagnostic X-Ray Materials] Angioedema     Other Allergy (See Comments) [External Allergen Needs Reconciliation - See Comment] Unknown     Contrast Media Ready-Box MISC, 04/28/2008.       Penicillins Unknown     Simvastatin Muscle Pain (Myalgia)        Reason for call: Nurse called to report that patient was crying and complaining of bilateral leg pain.  Family was present and upset that patient was in pain.  Patient and family want patient to be given Oxycodone and Celebrex for the pain.  Confusion surrounding whether patient was ever ordered Oxycodone as the MAR and discharge orders have Dilaudid written on them.  Patient yesterday was refusing all narcotics and even Tylenol.  Nurse assessed bilateral legs and they were negative for DVT or cellulitis.  Patient did have a low grade temperature of 99.9 so nurse did a COVID swab which resulted positive.          Verbal Order/Direction given by Provider: No to narcotics at this time but may start Celebrex 200 mg po daily x 7 days.      Provider giving Order:  OZIEL Velazquez    Verbal Order given to: Mercedes Moy RN

## 2023-02-01 NOTE — PROGRESS NOTES
[facility] GERIATRIC SERVICES    Facility:   [facility] () [[id number]]   Code Status: DNR/DNI      CHIEF COMPLAINT/REASON FOR VISIT:  Chief Complaint   Patient presents with     RECHECK       HISTORY:      HPI: Helen is a 89 year old female who currently resides in long-term care room [facility] and who I the pleasure of visiting with today secondary to following up regarding her chronic back pain.  In looking through the nursing notes she did have a fall the other day but they do not feel there is been any significant injury.  In talking with her today she does have a history of chronic back pain as well as left leg pain but she does not feel there is any new changes to the pain.  Couple of weeks ago we did add Celebrex to her regiment for about a week I did ask her about that and overall she did not feel that to be terribly effective.  For the pain she is on gabapentin 200 mg 4 times daily.  Would like to keep the Tylenol as needed.  Otherwise she does need assistance with all ADLs.  She feels her appetite to be fine.  Her weight on January 24 112 pounds back in January 12 113 pounds.  In the month of January her systolic blood pressures ranging 104-132.  According to nutritional notes she is eating between 25 and 100% of her meals.  We did talk again about her pain management to see if there is anything else I could do for her and she states that overall probably not she also does have as I reminded lidocaine cream that she can use as needed along with diclofenac gel we could also schedule the Tylenol if needed.    Past Medical History:   Diagnosis Date     Cellulitis of third toe of right foot      Dermatitis 2017     Diverticulosis 1998     Edema 2014     Esophageal reflux 2006     History of CVA (cerebrovascular accident) 3/22/2021     Hyperlipidemia 2007     Hypertension 2008     Osteoarthritis 2005     Paroxysmal atrial fibrillation (H) 2011     Persistent atrial fibrillation (H) 2015     Trochanteric  bursitis     osteoarthritis     Venous insufficiency 2016            Family History   Problem Relation Age of Onset     Heart Disease Mother      No Known Problems Father      Chronic Obstructive Pulmonary Disease Sister      Kidney failure Brother      Liver Disease Brother      No Known Problems Son      No Known Problems Daughter       Social History     Socioeconomic History     Marital status:      Number of children: 2   Tobacco Use     Smoking status: Never     Smokeless tobacco: Never   Substance and Sexual Activity     Alcohol use: No     Drug use: Yes     Types: Nitrous oxide     Sexual activity: Not Currently     Partners: Male   Social History Narrative    She is .  She lives alone independently.  Her son Nigel is heavily involved in her care.  She has grandchildren and great-grandchildren.      No new changes to the review of systems or physical exam since our last visit on January 11  REVIEW OF SYSTEM:  She currently denies any new symptoms of fever cough or cold allergies postnasal drip shortness of breath dyspnea wheezing chest pain dizziness vertigo nausea vomiting diarrhea dysuria frequency urgency headaches.  She does have a history of lumbar radiculopathy, pacemaker, CHF, GERD, hypertension, hyperlipidemia and CVA.    PHYSICAL EXAM:   Pleasant female in no acute distress.  Head is normocephalic.  Glasses.  Clear speech.  Neck is supple without adenopathy.  Lung sounds are clear throughout.  Cardiovascular S1-S2 regular rate and rhythm and no lower extremity edema.  Pacemaker left upper thorax.  Gastrointestinal thinly built nontender nondistended.  Musculoskeletal-history of chronic pain both in the low back as well as the left leg.  Currently on gabapentin. Psychiatric: Pleasant affect.       Current Outpatient Medications:      acetaminophen (TYLENOL) 325 MG tablet, Take 650 mg by mouth every 4 hours as needed for mild pain, Disp: , Rfl:      apixaban ANTICOAGULANT (ELIQUIS) 2.5 mg  Tab tablet, [APIXABAN ANTICOAGULANT (ELIQUIS) 2.5 MG TAB TABLET] Take 1 tablet (2.5 mg total) by mouth 2 (two) times a day., Disp: 180 tablet, Rfl: 3     Boswellia-Glucosamine-Vit D (OSTEO BI-FLEX ONE PER DAY PO), Give 1 tablet by mouth one time a day for GI upset, Disp: , Rfl:      cholecalciferol, vitamin D3, 1,000 unit tablet, Take 2,000 Units by mouth daily, Disp: , Rfl:      cyanocobalamin 1000 MCG tablet, [CYANOCOBALAMIN 1000 MCG TABLET] Take 1 tablet (1,000 mcg total) by mouth daily., Disp: 90 tablet, Rfl: 3     furosemide (LASIX) 20 MG tablet, One half tab daily (Patient taking differently: 10 mg One half tab daily), Disp: 45 tablet, Rfl: 3     gabapentin (NEURONTIN) 100 MG capsule, Take 1 capsule (100 mg) by mouth 3 times daily (Patient taking differently: Take 200 mg by mouth 4 times daily), Disp: 90 capsule, Rfl: 3     lidocaine (XYLOCAINE) 5 % external ointment, Apply topically as needed for moderate pain (4-6) Apply to Left lateral hip/thigh topically two times a day for Pain, Disp: , Rfl:      losartan (COZAAR) 25 MG tablet, [LOSARTAN (COZAAR) 25 MG TABLET] Take 1 tablet (25 mg total) by mouth daily., Disp: 90 tablet, Rfl: 3     metoprolol tartrate (LOPRESSOR) 25 MG tablet, [METOPROLOL TARTRATE (LOPRESSOR) 25 MG TABLET] Take 1 tablet (25 mg total) by mouth 2 (two) times a day. (Patient taking differently: Take 25 mg by mouth daily), Disp: 180 tablet, Rfl: 3     polyethylene glycol (MIRALAX) 17 g packet, Take 17 g by mouth daily, Disp: , Rfl:      sennosides (SENOKOT) 8.6 MG tablet, Take 1 tablet by mouth daily, Disp: , Rfl:     Current Facility-Administered Medications:      lidocaine 1 % injection 5 mL, 5 mL, Infiltration, Once, Surya Delaney MD    /56   Pulse 72   Temp 97.6  F (36.4  C)   Resp 16   Ht 1.524 m (5')   Wt 50.8 kg (112 lb)   SpO2 95%   BMI 21.87 kg/m      LABS:   Last Comprehensive Metabolic Panel:  Sodium   Date Value Ref Range Status   07/12/2022 134 (L) 136 - 145  mmol/L Final     Potassium   Date Value Ref Range Status   07/12/2022 4.6 3.4 - 5.3 mmol/L Final   05/09/2022 4.2 3.5 - 5.0 mmol/L Final     Chloride   Date Value Ref Range Status   07/12/2022 98 98 - 107 mmol/L Final   05/09/2022 105 98 - 107 mmol/L Final     Carbon Dioxide (CO2)   Date Value Ref Range Status   07/12/2022 25 22 - 29 mmol/L Final   05/09/2022 25 22 - 31 mmol/L Final     Anion Gap   Date Value Ref Range Status   07/12/2022 11 7 - 15 mmol/L Final   05/09/2022 11 5 - 18 mmol/L Final     Glucose   Date Value Ref Range Status   07/12/2022 84 70 - 99 mg/dL Final   05/09/2022 72 70 - 125 mg/dL Final     Urea Nitrogen   Date Value Ref Range Status   07/12/2022 18.2 8.0 - 23.0 mg/dL Final   05/09/2022 17 8 - 28 mg/dL Final     Creatinine   Date Value Ref Range Status   07/12/2022 0.91 0.51 - 0.95 mg/dL Final     GFR Estimate   Date Value Ref Range Status   07/12/2022 59 (L) >60 mL/min/1.73m2 Final     Comment:     Effective December 21, 2021 eGFRcr in adults is calculated using the 2021 CKD-EPI creatinine equation which includes age and gender (Rosi et al., NE, DOI: 10.1056/VQPVpm5348378)   04/20/2021 53 (L) >60 mL/min/1.73m2 Final     Calcium   Date Value Ref Range Status   07/12/2022 9.6 8.2 - 9.6 mg/dL Final     CBC RESULTS: Recent Labs   Lab Test 07/12/22  1419   WBC 8.0   RBC 3.97   HGB 11.5*   HCT 36.9   MCV 93   MCH 29.0   MCHC 31.2*   RDW 13.8              ASSESSMENT:    Encounter Diagnoses   Name Primary?     Lumbar radiculopathy Yes     Left leg pain      Gastroesophageal reflux disease without esophagitis      Essential hypertension        PLAN:    She states that she is doing well and even after her fall earlier in the week.  She states she is fine.  She does know that she needs assistance with ADLs.  She like to keep the gabapentin at 200 mg 4 times daily.  Does not want the Tylenol scheduled.  She does not believe the Celebrex was helpful for pain for that short period of time.  The  meantime continue with other vitamins and minerals as well as continue with the current regiment.  She is quite spirited today.  Quite conversational and quite engaging.  Seems to be doing well.        Electronically signed by: Aaron Lopes NP

## 2023-02-01 NOTE — LETTER
2/1/2023        RE: Helen Beckham  [address] [place] [address]        [facility] GERIATRIC SERVICES    Facility:   [facility] () [[id number]]   Code Status: DNR/DNI      CHIEF COMPLAINT/REASON FOR VISIT:  Chief Complaint   Patient presents with     RECHECK       HISTORY:      HPI: Helen is a 89 year old female who currently resides in long-term care room [facility] and who I the pleasure of visiting with today secondary to following up regarding her chronic back pain.  In looking through the nursing notes she did have a fall the other day but they do not feel there is been any significant injury.  In talking with her today she does have a history of chronic back pain as well as left leg pain but she does not feel there is any new changes to the pain.  Couple of weeks ago we did add Celebrex to her regiment for about a week I did ask her about that and overall she did not feel that to be terribly effective.  For the pain she is on gabapentin 200 mg 4 times daily.  Would like to keep the Tylenol as needed.  Otherwise she does need assistance with all ADLs.  She feels her appetite to be fine.  Her weight on January 24 112 pounds back in January 12 113 pounds.  In the month of January her systolic blood pressures ranging 104-132.  According to nutritional notes she is eating between 25 and 100% of her meals.  We did talk again about her pain management to see if there is anything else I could do for her and she states that overall probably not she also does have as I reminded lidocaine cream that she can use as needed along with diclofenac gel we could also schedule the Tylenol if needed.    Past Medical History:   Diagnosis Date     Cellulitis of third toe of right foot      Dermatitis 2017     Diverticulosis 1998     Edema 2014     Esophageal reflux 2006     History of CVA (cerebrovascular accident) 3/22/2021     Hyperlipidemia 2007     Hypertension 2008     Osteoarthritis 2005     Paroxysmal atrial  fibrillation (H) 2011     Persistent atrial fibrillation (H) 2015     Trochanteric bursitis     osteoarthritis     Venous insufficiency 2016            Family History   Problem Relation Age of Onset     Heart Disease Mother      No Known Problems Father      Chronic Obstructive Pulmonary Disease Sister      Kidney failure Brother      Liver Disease Brother      No Known Problems Son      No Known Problems Daughter       Social History     Socioeconomic History     Marital status:      Number of children: 2   Tobacco Use     Smoking status: Never     Smokeless tobacco: Never   Substance and Sexual Activity     Alcohol use: No     Drug use: Yes     Types: Nitrous oxide     Sexual activity: Not Currently     Partners: Male   Social History Narrative    She is .  She lives alone independently.  Her son Nigel is heavily involved in her care.  She has grandchildren and great-grandchildren.      No new changes to the review of systems or physical exam since our last visit on January 11  REVIEW OF SYSTEM:  She currently denies any new symptoms of fever cough or cold allergies postnasal drip shortness of breath dyspnea wheezing chest pain dizziness vertigo nausea vomiting diarrhea dysuria frequency urgency headaches.  She does have a history of lumbar radiculopathy, pacemaker, CHF, GERD, hypertension, hyperlipidemia and CVA.    PHYSICAL EXAM:   Pleasant female in no acute distress.  Head is normocephalic.  Glasses.  Clear speech.  Neck is supple without adenopathy.  Lung sounds are clear throughout.  Cardiovascular S1-S2 regular rate and rhythm and no lower extremity edema.  Pacemaker left upper thorax.  Gastrointestinal thinly built nontender nondistended.  Musculoskeletal-history of chronic pain both in the low back as well as the left leg.  Currently on gabapentin. Psychiatric: Pleasant affect.       Current Outpatient Medications:      acetaminophen (TYLENOL) 325 MG tablet, Take 650 mg by mouth every 4 hours  as needed for mild pain, Disp: , Rfl:      apixaban ANTICOAGULANT (ELIQUIS) 2.5 mg Tab tablet, [APIXABAN ANTICOAGULANT (ELIQUIS) 2.5 MG TAB TABLET] Take 1 tablet (2.5 mg total) by mouth 2 (two) times a day., Disp: 180 tablet, Rfl: 3     Boswellia-Glucosamine-Vit D (OSTEO BI-FLEX ONE PER DAY PO), Give 1 tablet by mouth one time a day for GI upset, Disp: , Rfl:      cholecalciferol, vitamin D3, 1,000 unit tablet, Take 2,000 Units by mouth daily, Disp: , Rfl:      cyanocobalamin 1000 MCG tablet, [CYANOCOBALAMIN 1000 MCG TABLET] Take 1 tablet (1,000 mcg total) by mouth daily., Disp: 90 tablet, Rfl: 3     furosemide (LASIX) 20 MG tablet, One half tab daily (Patient taking differently: 10 mg One half tab daily), Disp: 45 tablet, Rfl: 3     gabapentin (NEURONTIN) 100 MG capsule, Take 1 capsule (100 mg) by mouth 3 times daily (Patient taking differently: Take 200 mg by mouth 4 times daily), Disp: 90 capsule, Rfl: 3     lidocaine (XYLOCAINE) 5 % external ointment, Apply topically as needed for moderate pain (4-6) Apply to Left lateral hip/thigh topically two times a day for Pain, Disp: , Rfl:      losartan (COZAAR) 25 MG tablet, [LOSARTAN (COZAAR) 25 MG TABLET] Take 1 tablet (25 mg total) by mouth daily., Disp: 90 tablet, Rfl: 3     metoprolol tartrate (LOPRESSOR) 25 MG tablet, [METOPROLOL TARTRATE (LOPRESSOR) 25 MG TABLET] Take 1 tablet (25 mg total) by mouth 2 (two) times a day. (Patient taking differently: Take 25 mg by mouth daily), Disp: 180 tablet, Rfl: 3     polyethylene glycol (MIRALAX) 17 g packet, Take 17 g by mouth daily, Disp: , Rfl:      sennosides (SENOKOT) 8.6 MG tablet, Take 1 tablet by mouth daily, Disp: , Rfl:     Current Facility-Administered Medications:      lidocaine 1 % injection 5 mL, 5 mL, Infiltration, Once, Surya Delaney MD    /56   Pulse 72   Temp 97.6  F (36.4  C)   Resp 16   Ht 1.524 m (5')   Wt 50.8 kg (112 lb)   SpO2 95%   BMI 21.87 kg/m      LABS:   Last Comprehensive  Metabolic Panel:  Sodium   Date Value Ref Range Status   07/12/2022 134 (L) 136 - 145 mmol/L Final     Potassium   Date Value Ref Range Status   07/12/2022 4.6 3.4 - 5.3 mmol/L Final   05/09/2022 4.2 3.5 - 5.0 mmol/L Final     Chloride   Date Value Ref Range Status   07/12/2022 98 98 - 107 mmol/L Final   05/09/2022 105 98 - 107 mmol/L Final     Carbon Dioxide (CO2)   Date Value Ref Range Status   07/12/2022 25 22 - 29 mmol/L Final   05/09/2022 25 22 - 31 mmol/L Final     Anion Gap   Date Value Ref Range Status   07/12/2022 11 7 - 15 mmol/L Final   05/09/2022 11 5 - 18 mmol/L Final     Glucose   Date Value Ref Range Status   07/12/2022 84 70 - 99 mg/dL Final   05/09/2022 72 70 - 125 mg/dL Final     Urea Nitrogen   Date Value Ref Range Status   07/12/2022 18.2 8.0 - 23.0 mg/dL Final   05/09/2022 17 8 - 28 mg/dL Final     Creatinine   Date Value Ref Range Status   07/12/2022 0.91 0.51 - 0.95 mg/dL Final     GFR Estimate   Date Value Ref Range Status   07/12/2022 59 (L) >60 mL/min/1.73m2 Final     Comment:     Effective December 21, 2021 eGFRcr in adults is calculated using the 2021 CKD-EPI creatinine equation which includes age and gender (Rosi valencia al., NEJ, DOI: 10.1056/LVQVui2417095)   04/20/2021 53 (L) >60 mL/min/1.73m2 Final     Calcium   Date Value Ref Range Status   07/12/2022 9.6 8.2 - 9.6 mg/dL Final     CBC RESULTS: Recent Labs   Lab Test 07/12/22  1419   WBC 8.0   RBC 3.97   HGB 11.5*   HCT 36.9   MCV 93   MCH 29.0   MCHC 31.2*   RDW 13.8              ASSESSMENT:    Encounter Diagnoses   Name Primary?     Lumbar radiculopathy Yes     Left leg pain      Gastroesophageal reflux disease without esophagitis      Essential hypertension        PLAN:    She states that she is doing well and even after her fall earlier in the week.  She states she is fine.  She does know that she needs assistance with ADLs.  She like to keep the gabapentin at 200 mg 4 times daily.  Does not want the Tylenol scheduled.  She  does not believe the Celebrex was helpful for pain for that short period of time.  The meantime continue with other vitamins and minerals as well as continue with the current regiment.  She is quite spirited today.  Quite conversational and quite engaging.  Seems to be doing well.        Electronically signed by: Aaron Lopes NP          Sincerely,        Aaron Lopes NP

## 2023-02-03 NOTE — PROGRESS NOTES
[facility] GERIATRICS  INITIAL VISIT NOTE  February 3, 2023      PRIMARY CARE PROVIDER AND CLINIC:  Aaron Lopes [address] [facility] [address]    [facility] Medical Record Number:  [id number]  Place of Service where encounter took place:  [facility] () [[id number]]      Chief Complaint   Patient presents with     Establish Care       HPI:    Helen Beckham is a 89 year old  (12/3/1930) female seen today at [facility]. Medical history is notable for HFpEF, HTN, HLD, heart block s/p PPM, a-fib, CVA and GERD.   Recent medical course with two hospitalizations:   **[facility] 11/30/22 to 12/14/22 - presented with back pain and inability to ambulate. Imaging at [place] in Nov with L5 lesion concerning for metastasis. Biopsy this admission with chronic inflammation, no malignancy. She is s/p L5-S1 interlaminal ALICIA (12/13/22). Discharged home.   **[facility] 12/16/22 to 12/19/22 - presented with back pain and FTT at home.  Discharged to a TCU.     She was admitted to this facility on 1/11/23 for  medical management and nursing care.      History obtained from: facility chart records, facility staff, patient report, [facility] [place] chart review and Care Everywhere [facility] chart review.      Today, Ms. Beckham is seen sitting in a wheelchair just before breakfast. She has R hip/leg pain that is really bothersome. She said worse when she stands.  She is a limited historian (BIMS 11/15) due to dementia, but in talking with nursing she is consistent about reporting this pain. Family was wondering about oxycodone. She was chatting with another resident as we finished our visit and was quite animated and smiling in that conversation.     CODE STATUS: DNR / DNI    ALLERGIES:  Allergies   Allergen Reactions     Atorvastatin Unknown     Bee Sting Kit [Bee Venom] Unknown     Codeine Unknown     Iodinated Contrast Media [Diagnostic X-Ray Materials] Angioedema     Other Allergy (See Comments)  [External Allergen Needs Reconciliation - See Comment] Unknown     Contrast Media Ready-Box MISC, 04/28/2008.       Penicillins Unknown     Simvastatin Muscle Pain (Myalgia)       PAST MEDICAL HISTORY:   Past Medical History:   Diagnosis Date     Cellulitis of third toe of right foot      Dermatitis 2017     Diverticulosis 1998     Edema 2014     Esophageal reflux 2006     History of CVA (cerebrovascular accident) 3/22/2021     Hyperlipidemia 2007     Hypertension 2008     Osteoarthritis 2005     Paroxysmal atrial fibrillation (H) 2011     Persistent atrial fibrillation (H) 2015     Trochanteric bursitis     osteoarthritis     Venous insufficiency 2016       PAST SURGICAL HISTORY:   Past Surgical History:   Procedure Laterality Date     EYE SURGERY      bilateral cataract surgery     FRACTURE SURGERY      left toe     JOINT REPLACEMENT       OTHER SURGICAL HISTORY  02/28/2019    Single chamber ICDFor complete heart block and torsade de point arrhythmia     ME CARDIOVERSION ELECTIVE ARRHYTHMIA EXTERNAL      Description: Elective Cardioversion External;  Recorded: 05/19/2014;  Comments: 4/28/14     ME EXCISE HAND/FOOT NEUROMA      Description: Excision Of Neuroma Of Right Foot;  Proc Date: 04/01/2005;     TONSILLECTOMY       ZZC RECONSTR TOTAL SHOULDER IMPLANT      Description: Shoulder Arthroplasty Total Shoulder Replacement;  Proc Date: 07/13/2011;  Comments: REVERSE TOTAL SHOULDER       FAMILY HISTORY:   Family History   Problem Relation Age of Onset     Heart Disease Mother      No Known Problems Father      Chronic Obstructive Pulmonary Disease Sister      Kidney failure Brother      Liver Disease Brother        SOCIAL HISTORY:   Patient's living condition: lives in a skilled nursing facility    MEDICATIONS:  Post Discharge Medication Reconciliation Status: discharge medications reconciled and changed, per note/orders.  Current Outpatient Medications   Medication Sig Dispense Refill     acetaminophen (TYLENOL)  325 MG tablet Take 650 mg by mouth every 4 hours as needed for mild pain       apixaban ANTICOAGULANT (ELIQUIS) 2.5 mg Tab tablet [APIXABAN ANTICOAGULANT (ELIQUIS) 2.5 MG TAB TABLET] Take 1 tablet (2.5 mg total) by mouth 2 (two) times a day. 180 tablet 3     biotin 1000 MCG TABS tablet Take 1,000 mcg by mouth daily       Boswellia-Glucosamine-Vit D (OSTEO BI-FLEX ONE PER DAY PO) Give 1 tablet by mouth one time a  day for GI upset       cholecalciferol, vitamin D3, 1,000 unit tablet Take 2,000 Units by mouth daily       cyanocobalamin 1000 MCG tablet [CYANOCOBALAMIN 1000 MCG TABLET] Take 1 tablet (1,000 mcg total) by mouth daily. 90 tablet 3     diclofenac (VOLTAREN) 1 % topical gel Apply topically 3 times daily Left hip and lower back       furosemide (LASIX) 20 MG tablet One half tab daily (Patient taking differently: 10 mg One half tab daily) 45 tablet 3     gabapentin (NEURONTIN) 100 MG capsule Take 1 capsule (100 mg) by mouth 3 times daily (Patient taking differently: Take 200 mg by mouth 4 times daily) 90 capsule 3     lidocaine (XYLOCAINE) 5 % external ointment Apply topically as needed for moderate pain (4-6) Apply to Left lateral hip/thigh  topically two times a day for Pain       losartan (COZAAR) 25 MG tablet [LOSARTAN (COZAAR) 25 MG TABLET] Take 1 tablet (25 mg total) by mouth daily. 90 tablet 3     metoprolol tartrate (LOPRESSOR) 25 MG tablet [METOPROLOL TARTRATE (LOPRESSOR) 25 MG TABLET] Take 1 tablet (25 mg total) by mouth 2 (two) times a day. (Patient taking differently: Take 25 mg by mouth daily) 180 tablet 3     polyethylene glycol (MIRALAX) 17 g packet Take 17 g by mouth daily       sennosides (SENOKOT) 8.6 MG tablet Take 1 tablet by mouth 2 times daily as needed         ROS:  Unable to obtain due to cognitive impairment or aphasia. [place] 11/15.     PHYSICAL EXAM:  /56   Pulse 72   Temp 97.6  F (36.4  C)   Resp 16   Ht 1.524 m (5')   Wt 50.8 kg (112 lb)   SpO2 95%   BMI 21.87 kg/m     Gen: sitting in wheelchair, alert, cooperative and in no acute distress  Resp: breathing non labored, no tachypnea   Ext: no LE edema  Neuro: CX II-XII grossly in tact; ROM in all four extremities grossly in tact  Psych: alert and oriented to self and general situation      LABORATORY/IMAGING DATA:  Reviewed as per [facility] and/or [facility]    ASSESSMENT/PLAN:    Lumbar Back Pain s/p L5-S1 Interlaminal ALICIA (12/13/22)  Endorsing R leg pain today.   -- APAP 650 mg q4h PRN, diclofenac gel TID, gabapentin 200 mg QID, lidoderm 5% patch on q12h/off q12h    HFpEF, HTN, HLD  SBPs 110s. HR 70s. Weigh stable 113 lbs  -- furosemide 10 mg daily, losartan 25 mg daily, metoprolol 25 mg BID    Chronic Atrial Fibrillation  History of CVA  HR 70s  -- metoprolol 25 mg BID  -- apixaban 2.5 mg BID    Cognitive Impairment  BIMS 11/15  -- ongoing 24/7 nursing and supportive cares     CKD, Stage III  Baseline Cr in normal range. She is on a diuretic and an ARB  -- avoid nephrotoxic meds  -- periodic BMP q6 mos per goals of care    Slow Transit Constipation  -- Miralax 17g daily and Senna 1 tab BID PRN  -- adjust bowel regimen as needed    Electronically signed by:  Aye Ramírez MD

## 2023-02-03 NOTE — LETTER
2/3/2023        RE: Helen Beckham  [address] [place] [address]        [facility] GERIATRICS  INITIAL VISIT NOTE  February 3, 2023      PRIMARY CARE PROVIDER AND CLINIC:  Aaron Lopes [address] [facility] [id number]    [facility] Medical Record Number:  [id number]  Place of Service where encounter took place:  [facility] () [[id number]]      Chief Complaint   Patient presents with     [facility] Care       HPI:    Helen Beckham is a 89 year old  (12/3/1930) female seen today at [facility]. Medical history is notable for HFpEF, HTN, HLD, heart block s/p PPM, a-fib, CVA and GERD.   Recent medical course with two hospitalizations:   **[place] [place] 11/30/22 to 12/14/22 - presented with back pain and inability to ambulate. Imaging at [facility] in Nov with L5 lesion concerning for metastasis. Biopsy this admission with chronic inflammation, no malignancy. She is s/p L5-S1 interlaminal ALICIA (12/13/22). Discharged home.   **[facility] 12/16/22 to 12/19/22 - presented with back pain and FTT at home.  Discharged to a TCU.     She was admitted to this facility on 1/11/23 for  medical management and nursing care.      History obtained from: facility chart records, facility staff, patient report, [facility] chart review and Care Everywhere [facility] chart review.      Today, Ms. Beckham is seen sitting in a wheelchair just before breakfast. She has R hip/leg pain that is really bothersome. She said worse when she stands.  She is a limited historian (BIMS 11/15) due to dementia, but in talking with nursing she is consistent about reporting this pain. Family was wondering about oxycodone. She was chatting with another resident as we finished our visit and was quite animated and smiling in that conversation.     CODE STATUS: DNR / DNI    ALLERGIES:  Allergies   Allergen Reactions     Atorvastatin Unknown     Bee Sting Kit [Bee Venom] Unknown     Codeine Unknown     Iodinated  Contrast Media [Diagnostic X-Ray Materials] Angioedema     Other Allergy (See Comments) [External Allergen Needs Reconciliation - See Comment] Unknown     Contrast Media Ready-Box MISC, 04/28/2008.       Penicillins Unknown     Simvastatin Muscle Pain (Myalgia)       PAST MEDICAL HISTORY:   Past Medical History:   Diagnosis Date     Cellulitis of third toe of right foot      Dermatitis 2017     Diverticulosis 1998     Edema 2014     Esophageal reflux 2006     History of CVA (cerebrovascular accident) 3/22/2021     Hyperlipidemia 2007     Hypertension 2008     Osteoarthritis 2005     Paroxysmal atrial fibrillation (H) 2011     Persistent atrial fibrillation (H) 2015     Trochanteric bursitis     osteoarthritis     Venous insufficiency 2016       PAST SURGICAL HISTORY:   Past Surgical History:   Procedure Laterality Date     EYE SURGERY      bilateral cataract surgery     FRACTURE SURGERY      left toe     JOINT REPLACEMENT       OTHER SURGICAL HISTORY  02/28/2019    Single chamber ICDFor complete heart block and torsade de point arrhythmia     AR CARDIOVERSION ELECTIVE ARRHYTHMIA EXTERNAL      Description: Elective Cardioversion External;  Recorded: 05/19/2014;  Comments: 4/28/14     AR EXCISE HAND/FOOT NEUROMA      Description: Excision Of Neuroma Of Right Foot;  Proc Date: 04/01/2005;     TONSILLECTOMY       ZZC RECONSTR TOTAL SHOULDER IMPLANT      Description: Shoulder Arthroplasty Total Shoulder Replacement;  Proc Date: 07/13/2011;  Comments: REVERSE TOTAL SHOULDER       FAMILY HISTORY:   Family History   Problem Relation Age of Onset     Heart Disease Mother      No Known Problems Father      Chronic Obstructive Pulmonary Disease Sister      Kidney failure Brother      Liver Disease Brother        SOCIAL HISTORY:   Patient's living condition: lives in a skilled nursing facility    MEDICATIONS:  Post Discharge Medication Reconciliation Status: discharge medications reconciled and changed, per  note/orders.  Current Outpatient Medications   Medication Sig Dispense Refill     acetaminophen (TYLENOL) 325 MG tablet Take 650 mg by mouth every 4 hours as needed for mild pain       apixaban ANTICOAGULANT (ELIQUIS) 2.5 mg Tab tablet [APIXABAN ANTICOAGULANT (ELIQUIS) 2.5 MG TAB TABLET] Take 1 tablet (2.5 mg total) by mouth 2 (two) times a day. 180 tablet 3     biotin 1000 MCG TABS tablet Take 1,000 mcg by mouth daily       Boswellia-Glucosamine-Vit D (OSTEO BI-FLEX ONE PER DAY PO) Give 1 tablet by mouth one time a  day for GI upset       cholecalciferol, vitamin D3, 1,000 unit tablet Take 2,000 Units by mouth daily       cyanocobalamin 1000 MCG tablet [CYANOCOBALAMIN 1000 MCG TABLET] Take 1 tablet (1,000 mcg total) by mouth daily. 90 tablet 3     diclofenac (VOLTAREN) 1 % topical gel Apply topically 3 times daily Left hip and lower back       furosemide (LASIX) 20 MG tablet One half tab daily (Patient taking differently: 10 mg One half tab daily) 45 tablet 3     gabapentin (NEURONTIN) 100 MG capsule Take 1 capsule (100 mg) by mouth 3 times daily (Patient taking differently: Take 200 mg by mouth 4 times daily) 90 capsule 3     lidocaine (XYLOCAINE) 5 % external ointment Apply topically as needed for moderate pain (4-6) Apply to Left lateral hip/thigh  topically two times a day for Pain       losartan (COZAAR) 25 MG tablet [LOSARTAN (COZAAR) 25 MG TABLET] Take 1 tablet (25 mg total) by mouth daily. 90 tablet 3     metoprolol tartrate (LOPRESSOR) 25 MG tablet [METOPROLOL TARTRATE (LOPRESSOR) 25 MG TABLET] Take 1 tablet (25 mg total) by mouth 2 (two) times a day. (Patient taking differently: Take 25 mg by mouth daily) 180 tablet 3     polyethylene glycol (MIRALAX) 17 g packet Take 17 g by mouth daily       sennosides (SENOKOT) 8.6 MG tablet Take 1 tablet by mouth 2 times daily as needed         ROS:  Unable to obtain due to cognitive impairment or aphasia. BIMS 11/15.     PHYSICAL EXAM:  /56   Pulse 72   Temp  97.6  F (36.4  C)   Resp 16   Ht 1.524 m (5')   Wt 50.8 kg (112 lb)   SpO2 95%   BMI 21.87 kg/m    Gen: sitting in wheelchair, alert, cooperative and in no acute distress  Resp: breathing non labored, no tachypnea   Ext: no LE edema  Neuro: CX II-XII grossly in tact; ROM in all four extremities grossly in tact  Psych: alert and oriented to self and general situation      LABORATORY/IMAGING DATA:  Reviewed as per [facility] and/or [facility]    ASSESSMENT/PLAN:    Lumbar Back Pain s/p L5-S1 Interlaminal ALICIA (12/13/22)  Endorsing R leg pain today.   -- APAP 650 mg q4h PRN, diclofenac gel TID, gabapentin 200 mg QID, lidoderm 5% patch on q12h/off q12h    HFpEF, HTN, HLD  SBPs 110s. HR 70s. Weigh stable 113 lbs  -- furosemide 10 mg daily, losartan 25 mg daily, metoprolol 25 mg BID    Chronic Atrial Fibrillation  History of CVA  HR 70s  -- metoprolol 25 mg BID  -- apixaban 2.5 mg BID    Cognitive Impairment  BIMS 11/15  -- ongoing 24/7 nursing and supportive cares     CKD, Stage III  Baseline Cr in normal range. She is on a diuretic and an ARB  -- avoid nephrotoxic meds  -- periodic BMP q6 mos per goals of care    Slow Transit Constipation  -- Miralax 17g daily and Senna 1 tab BID PRN  -- adjust bowel regimen as needed    Electronically signed by:  Aye Ramírez MD                            Sincerely,        Aye Ramírez MD

## 2023-03-02 NOTE — PROGRESS NOTES
[facility] GERIATRIC SERVICES    Facility:   [facility] () [[id number]]   Code Status: DNR/DNI      CHIEF COMPLAINT/REASON FOR VISIT:  Chief Complaint   Patient presents with     [facility] Care Coordination - Regulatory       HISTORY:      HPI: Helen is a 89 year old female  who currently resides in long-term care room [facility] and who I was asked to visit with today secondary to a regulatory review of her chronic medical conditions.  Her only blood pressure in the month of February was February 28 at 112/56 her weight on February 28 was 110 pounds back on February 7 she weighed 112 pounds.  Had a chance to talk to her about her nutrition as well as her overall care.  She is able to ambulate with a 2 wheeled walker in the hallway as well as in her room.  She does have the chronic back pain and I did have a chance to share with her her previous CT of the lumbar spine going back to 2019 and that there was multilevel disc and facet degeneration with a mild lumbar scoliosis and specific findings such as 4 mm right posterior lateral disc protrusion at L4-5 as well as moderate subarticular recess stenosis bilateral L5-S1 and on the left at L3-4 and L2-3.  She does have moderate foraminal stenosis on the right L5-S1 and L4-5 and on the left L3-4.  She also does have a 4 mm right paracentral disc herniation at T12-L1 without neural impingement.  Then the second check from November 2022 did show severe degenerative central stenosis L4-5 along with disc degeneration at each level T12-L1 to L2-3 without significant spinal stenosis as well as L5-S1 facet arthropathy with grade 1 spondylolisthesis and moderate central stenosis behind upper S1.  She did have a device check last on January 23.  For her pain she does take gabapentin 200 mg 4 times daily also has lidocaine cream and Voltaren gel along with nortriptyline 10 mg in the morning.  She did take a Tylenol as needed in the month of February on February 15.  No stool  softeners was required in the month of February.  Her last hemoglobin in December was 12.5.    Past Medical History:   Diagnosis Date     Cellulitis of third toe of right foot      Dermatitis 2017     Diverticulosis 1998     Edema 2014     Esophageal reflux 2006     History of CVA (cerebrovascular accident) 3/22/2021     Hyperlipidemia 2007     Hypertension 2008     Osteoarthritis 2005     Paroxysmal atrial fibrillation (H) 2011     Persistent atrial fibrillation (H) 2015     Trochanteric bursitis     osteoarthritis     Venous insufficiency 2016            Family History   Problem Relation Age of Onset     Heart Disease Mother      No Known Problems Father      Chronic Obstructive Pulmonary Disease Sister      Kidney failure Brother      Liver Disease Brother      No Known Problems Son      No Known Problems Daughter       Social History     Socioeconomic History     Marital status:      Number of children: 2   Tobacco Use     Smoking status: Never     Smokeless tobacco: Never   Substance and Sexual Activity     Alcohol use: No     Drug use: Yes     Types: Nitrous oxide     Sexual activity: Not Currently     Partners: Male   Social History Narrative    She is .  She lives alone independently.  Her son Nigel is heavily involved in her care.  She has grandchildren and great-grandchildren.      No new changes to the review of systems or physical exam since our last visit on February 1  REVIEW OF SYSTEM:  She currently denies any new symptoms of fever cough or cold allergies postnasal drip shortness of breath dyspnea wheezing chest pain dizziness vertigo nausea vomiting diarrhea dysuria frequency urgency headaches.  She does have a history of lumbar radiculopathy, pacemaker, CHF, GERD, hypertension, hyperlipidemia and CVA.    PHYSICAL EXAM:   Pleasant female in no acute distress.  Head is normocephalic.  Glasses.  Clear speech.  Neck is supple without adenopathy.  Lung sounds are clear throughout.   Cardiovascular S1-S2 regular rate and rhythm and no lower extremity edema.  Pacemaker left upper thorax.  Gastrointestinal thinly built nontender nondistended.  Musculoskeletal-history of chronic pain both in the low back as well as the left leg.  She is able ambulate with a front wheel walker.  Psychiatric: Pleasant affect.    Current Outpatient Medications:      nortriptyline (PAMELOR) 10 MG capsule, Take 10 mg by mouth daily before breakfast, Disp: , Rfl:      acetaminophen (TYLENOL) 325 MG tablet, Take 650 mg by mouth every 4 hours as needed for mild pain, Disp: , Rfl:      apixaban ANTICOAGULANT (ELIQUIS) 2.5 mg Tab tablet, [APIXABAN ANTICOAGULANT (ELIQUIS) 2.5 MG TAB TABLET] Take 1 tablet (2.5 mg total) by mouth 2 (two) times a day., Disp: 180 tablet, Rfl: 3     biotin 1000 MCG TABS tablet, Take 1,000 mcg by mouth daily, Disp: , Rfl:      Boswellia-Glucosamine-Vit D (OSTEO BI-FLEX ONE PER DAY PO), Give 1 tablet by mouth one time a day for GI upset, Disp: , Rfl:      cholecalciferol, vitamin D3, 1,000 unit tablet, Take 2,000 Units by mouth daily, Disp: , Rfl:      cyanocobalamin 1000 MCG tablet, [CYANOCOBALAMIN 1000 MCG TABLET] Take 1 tablet (1,000 mcg total) by mouth daily., Disp: 90 tablet, Rfl: 3     diclofenac (VOLTAREN) 1 % topical gel, Apply topically 3 times daily Left hip and lower back, Disp: , Rfl:      furosemide (LASIX) 20 MG tablet, One half tab daily (Patient taking differently: 10 mg One half tab daily), Disp: 45 tablet, Rfl: 3     gabapentin (NEURONTIN) 100 MG capsule, Take 1 capsule (100 mg) by mouth 3 times daily (Patient taking differently: Take 200 mg by mouth 4 times daily), Disp: 90 capsule, Rfl: 3     lidocaine (XYLOCAINE) 5 % external ointment, Apply topically as needed for moderate pain (4-6) Apply to Left lateral hip/thigh topically two times a day for Pain, Disp: , Rfl:      losartan (COZAAR) 25 MG tablet, [LOSARTAN (COZAAR) 25 MG TABLET] Take 1 tablet (25 mg total) by mouth daily.,  "Disp: 90 tablet, Rfl: 3     metoprolol tartrate (LOPRESSOR) 25 MG tablet, [METOPROLOL TARTRATE (LOPRESSOR) 25 MG TABLET] Take 1 tablet (25 mg total) by mouth 2 (two) times a day. (Patient taking differently: Take 25 mg by mouth daily), Disp: 180 tablet, Rfl: 3     polyethylene glycol (MIRALAX) 17 g packet, Take 17 g by mouth daily, Disp: , Rfl:      sennosides (SENOKOT) 8.6 MG tablet, Take 1 tablet by mouth 2 times daily as needed, Disp: , Rfl:     /56   Pulse 72   Resp 18   Ht 1.549 m (5' 1\")   Wt 50.1 kg (110 lb 8 oz)   SpO2 96%   BMI 20.88 kg/m        LABS:   CBC RESULTS: Recent Labs   Lab Test 07/12/22  1419   WBC 8.0   RBC 3.97   HGB 11.5*   HCT 36.9   MCV 93   MCH 29.0   MCHC 31.2*   RDW 13.8        Last Comprehensive Metabolic Panel:  Lab Results   Component Value Date     (L) 07/12/2022    POTASSIUM 4.6 07/12/2022    CHLORIDE 98 07/12/2022    CO2 25 07/12/2022    ANIONGAP 11 07/12/2022    GLC 84 07/12/2022    BUN 18.2 07/12/2022    CR 0.91 07/12/2022    GFRESTIMATED 59 (L) 07/12/2022    KONSTANTIN 9.6 07/12/2022           ASSESSMENT:    (I10) Essential hypertension  (primary encounter diagnosis)  Comment: Doing well  Plan: Continue to manage    (Z95.810) Cardiac defibrillator in situ  Comment: Had a cardiac visit in January  Plan: Continue to monitor    (R93.7) Abnormal CT scan, lumbar spine  Comment: She is aware of her lumbar CT spine  Plan: Currently being treated    (K21.9) Gastroesophageal reflux disease without esophagitis  Comment: She denies any appetite changes or bowel changes  Plan: She is not on a PPI      Case Management:  I have reviewed the facility/SNF care plan/MDS which was done Today, including the falls risk, nutrition and pain screening. I also reviewed the current immunizations, and preventive care.. Future cancer screening is not clinically indicated secondary to age/goals of care.   Patient's desire to return to the community is present, but is not able due to care " needs .    Information reviewed:  Medications, vital signs, orders, and nursing notes.  PLAN:    Had a chance to go over her medications as well as her nutrition and her medications and her back pain.  Very nice lady.  Had a wonderful visit with her once again today.  Quite the conversationalist.  Fun to hear how she is doing as well as any other concerns that she might have.  In the meantime continue to manage and follow her chronic medical conditions.    Electronically signed by: Aaron Lopes NP

## 2023-03-02 NOTE — LETTER
3/2/2023        RE: Helen Beckham  [address] [place] [address]          [facility] GERIATRIC SERVICES    Facility:   [facility] () [[id number]]   Code Status: DNR/DNI      CHIEF COMPLAINT/REASON FOR VISIT:  Chief Complaint   Patient presents with     FVP Care Coordination - Regulatory       HISTORY:      HPI: Helen is a 89 year old female  who currently resides in long-term care room [facility] and who I was asked to visit with today secondary to a regulatory review of her chronic medical conditions.  Her only blood pressure in the month of February was February 28 at 112/56 her weight on February 28 was 110 pounds back on February 7 she weighed 112 pounds.  Had a chance to talk to her about her nutrition as well as her overall care.  She is able to ambulate with a 2 wheeled walker in the hallway as well as in her room.  She does have the chronic back pain and I did have a chance to share with her her previous CT of the lumbar spine going back to 2019 and that there was multilevel disc and facet degeneration with a mild lumbar scoliosis and specific findings such as 4 mm right posterior lateral disc protrusion at L4-5 as well as moderate subarticular recess stenosis bilateral L5-S1 and on the left at L3-4 and L2-3.  She does have moderate foraminal stenosis on the right L5-S1 and L4-5 and on the left L3-4.  She also does have a 4 mm right paracentral disc herniation at T12-L1 without neural impingement.  Then the second check from November 2022 did show severe degenerative central stenosis L4-5 along with disc degeneration at each level T12-L1 to L2-3 without significant spinal stenosis as well as L5-S1 facet arthropathy with grade 1 spondylolisthesis and moderate central stenosis behind upper S1.  She did have a device check last on January 23.  For her pain she does take gabapentin 200 mg 4 times daily also has lidocaine cream and Voltaren gel along with nortriptyline 10 mg in the morning.  She  did take a Tylenol as needed in the month of February on February 15.  No stool softeners was required in the month of February.  Her last hemoglobin in December was 12.5.    Past Medical History:   Diagnosis Date     Cellulitis of third toe of right foot      Dermatitis 2017     Diverticulosis 1998     Edema 2014     Esophageal reflux 2006     History of CVA (cerebrovascular accident) 3/22/2021     Hyperlipidemia 2007     Hypertension 2008     Osteoarthritis 2005     Paroxysmal atrial fibrillation (H) 2011     Persistent atrial fibrillation (H) 2015     Trochanteric bursitis     osteoarthritis     Venous insufficiency 2016            Family History   Problem Relation Age of Onset     Heart Disease Mother      No Known Problems Father      Chronic Obstructive Pulmonary Disease Sister      Kidney failure Brother      Liver Disease Brother      No Known Problems Son      No Known Problems Daughter       Social History     Socioeconomic History     Marital status:      Number of children: 2   Tobacco Use     Smoking status: Never     Smokeless tobacco: Never   Substance and Sexual Activity     Alcohol use: No     Drug use: Yes     Types: Nitrous oxide     Sexual activity: Not Currently     Partners: Male   Social History Narrative    She is .  She lives alone independently.  Her son Nigel is heavily involved in her care.  She has grandchildren and great-grandchildren.      No new changes to the review of systems or physical exam since our last visit on February 1  REVIEW OF SYSTEM:  She currently denies any new symptoms of fever cough or cold allergies postnasal drip shortness of breath dyspnea wheezing chest pain dizziness vertigo nausea vomiting diarrhea dysuria frequency urgency headaches.  She does have a history of lumbar radiculopathy, pacemaker, CHF, GERD, hypertension, hyperlipidemia and CVA.    PHYSICAL EXAM:   Pleasant female in no acute distress.  Head is normocephalic.  Glasses.  Clear speech.   Neck is supple without adenopathy.  Lung sounds are clear throughout.  Cardiovascular S1-S2 regular rate and rhythm and no lower extremity edema.  Pacemaker left upper thorax.  Gastrointestinal thinly built nontender nondistended.  Musculoskeletal-history of chronic pain both in the low back as well as the left leg.  She is able ambulate with a front wheel walker.  Psychiatric: Pleasant affect.    Current Outpatient Medications:      nortriptyline (PAMELOR) 10 MG capsule, Take 10 mg by mouth daily before breakfast, Disp: , Rfl:      acetaminophen (TYLENOL) 325 MG tablet, Take 650 mg by mouth every 4 hours as needed for mild pain, Disp: , Rfl:      apixaban ANTICOAGULANT (ELIQUIS) 2.5 mg Tab tablet, [APIXABAN ANTICOAGULANT (ELIQUIS) 2.5 MG TAB TABLET] Take 1 tablet (2.5 mg total) by mouth 2 (two) times a day., Disp: 180 tablet, Rfl: 3     biotin 1000 MCG TABS tablet, Take 1,000 mcg by mouth daily, Disp: , Rfl:      Boswellia-Glucosamine-Vit D (OSTEO BI-FLEX ONE PER DAY PO), Give 1 tablet by mouth one time a day for GI upset, Disp: , Rfl:      cholecalciferol, vitamin D3, 1,000 unit tablet, Take 2,000 Units by mouth daily, Disp: , Rfl:      cyanocobalamin 1000 MCG tablet, [CYANOCOBALAMIN 1000 MCG TABLET] Take 1 tablet (1,000 mcg total) by mouth daily., Disp: 90 tablet, Rfl: 3     diclofenac (VOLTAREN) 1 % topical gel, Apply topically 3 times daily Left hip and lower back, Disp: , Rfl:      furosemide (LASIX) 20 MG tablet, One half tab daily (Patient taking differently: 10 mg One half tab daily), Disp: 45 tablet, Rfl: 3     gabapentin (NEURONTIN) 100 MG capsule, Take 1 capsule (100 mg) by mouth 3 times daily (Patient taking differently: Take 200 mg by mouth 4 times daily), Disp: 90 capsule, Rfl: 3     lidocaine (XYLOCAINE) 5 % external ointment, Apply topically as needed for moderate pain (4-6) Apply to Left lateral hip/thigh topically two times a day for Pain, Disp: , Rfl:      losartan (COZAAR) 25 MG tablet,  "[LOSARTAN (COZAAR) 25 MG TABLET] Take 1 tablet (25 mg total) by mouth daily., Disp: 90 tablet, Rfl: 3     metoprolol tartrate (LOPRESSOR) 25 MG tablet, [METOPROLOL TARTRATE (LOPRESSOR) 25 MG TABLET] Take 1 tablet (25 mg total) by mouth 2 (two) times a day. (Patient taking differently: Take 25 mg by mouth daily), Disp: 180 tablet, Rfl: 3     polyethylene glycol (MIRALAX) 17 g packet, Take 17 g by mouth daily, Disp: , Rfl:      sennosides (SENOKOT) 8.6 MG tablet, Take 1 tablet by mouth 2 times daily as needed, Disp: , Rfl:     /56   Pulse 72   Resp 18   Ht 1.549 m (5' 1\")   Wt 50.1 kg (110 lb 8 oz)   SpO2 96%   BMI 20.88 kg/m        LABS:   CBC RESULTS: Recent Labs   Lab Test 07/12/22  1419   WBC 8.0   RBC 3.97   HGB 11.5*   HCT 36.9   MCV 93   MCH 29.0   MCHC 31.2*   RDW 13.8        Last Comprehensive Metabolic Panel:  Lab Results   Component Value Date     (L) 07/12/2022    POTASSIUM 4.6 07/12/2022    CHLORIDE 98 07/12/2022    CO2 25 07/12/2022    ANIONGAP 11 07/12/2022    GLC 84 07/12/2022    BUN 18.2 07/12/2022    CR 0.91 07/12/2022    GFRESTIMATED 59 (L) 07/12/2022    KONSTANTIN 9.6 07/12/2022           ASSESSMENT:    (I10) Essential hypertension  (primary encounter diagnosis)  Comment: Doing well  Plan: Continue to manage    (Z95.810) Cardiac defibrillator in situ  Comment: Had a cardiac visit in January  Plan: Continue to monitor    (R93.7) Abnormal CT scan, lumbar spine  Comment: She is aware of her lumbar CT spine  Plan: Currently being treated    (K21.9) Gastroesophageal reflux disease without esophagitis  Comment: She denies any appetite changes or bowel changes  Plan: She is not on a PPI      Case Management:  I have reviewed the facility/SNF care plan/MDS which was done Today, including the falls risk, nutrition and pain screening. I also reviewed the current immunizations, and preventive care.. Future cancer screening is not clinically indicated secondary to age/goals of care. "   Patient's desire to return to the community is present, but is not able due to care needs .    Information reviewed:  Medications, vital signs, orders, and nursing notes.  PLAN:    Had a chance to go over her medications as well as her nutrition and her medications and her back pain.  Very nice lady.  Had a wonderful visit with her once again today.  Quite the conversationalist.  Fun to hear how she is doing as well as any other concerns that she might have.  In the meantime continue to manage and follow her chronic medical conditions.    Electronically signed by: Aaron Lopes NP            Sincerely,        Aaron Lopes NP

## 2023-03-15 NOTE — TELEPHONE ENCOUNTER
[facility] Geriatrics Lab Note     Provider: OZIEL Velazquez  Facility: [facility]  Facility Type:  [facility]    Allergies   Allergen Reactions     Atorvastatin Unknown     Bee Sting Kit [Bee Venom] Unknown     Codeine Unknown     Iodinated Contrast Media [Diagnostic X-Ray Materials] Angioedema     Other Allergy (See Comments) [External Allergen Needs Reconciliation - See Comment] Unknown     Contrast Media Ready-Box MISC, 04/28/2008.       Penicillins Unknown     Simvastatin Muscle Pain (Myalgia)       Labs Reviewed by provider: CXR negative. Pt O2 sats are 92-93% on 4LPM - patient is also very weak and fatigued, not eating or drinking well.         Verbal Order/Direction given by Provider:   - Check CBC w/ diff and BMP tomorrow 3/16/23    Provider giving Order:  OZIEL Velazquez    Verbal Order given to: Mercedes Pedro RN

## 2023-03-16 NOTE — LETTER
3/16/2023        RE: Helen Beckham  [address] [place] [address]        [facility] SERVICES    Facility:   [facility] () [[id number]]   Code Status: DNR/DNI      CHIEF COMPLAINT/REASON FOR VISIT:  Chief Complaint   Patient presents with     RECHECK       HISTORY:      HPI: Helen is a 89 year old female Who I was asked to visit with today secondary to periods of hypoxia who recently did begin oxygen therapy to keep her saturations above 90% plus now has been having some issues of taking in her medications as well as her nutrient supplements.  I did have a chance to meet with the patient she did open her eyes did have a nice smile on her face and unfortunately was really unable to answer too many questions for me but she tried to be somewhat engaging.  Always a very delightful lady to talk with.  At any rate I did have a chance to see her son today and we had a wonderful visit as well talking about the overall clinical findings as well as failure to thrive and the inability to take in foods or fluids and take her medications.  We did do a chest x-ray the other day and there was no infiltrate or effusion she did have a large hiatal hernia.  She did have a CBC and a BMP today and the creatinine is elevated the potassium was elevated but she also has not had any p.o. intake along with staff unsure that she is actually able taking in the appropriate amount of medications.  It does appear that she is in renal failure and after further conversation and discussion it was deemed that she would benefit from a change in the care model and going to hospice.  She does appear to be very comfortable at this time.  Her blood pressure on March 14 was 147/118 and there was no other blood pressures taken for comparison the last 1 prior to that was on March 7 at 108/58 her weight last on March 7 was 108 pounds back on February 7 she weighed 112 pounds.  She has been afebrile.  Currently on oxygen 3 L.   Her lungs are clear but decreased at the bases due to poor effort.  There is no edema.  She does not appear to be in any pain.    Past Medical History:   Diagnosis Date     Cellulitis of third toe of right foot      Dermatitis 2017     Diverticulosis 1998     Edema 2014     Esophageal reflux 2006     History of CVA (cerebrovascular accident) 3/22/2021     Hyperlipidemia 2007     Hypertension 2008     Osteoarthritis 2005     Paroxysmal atrial fibrillation (H) 2011     Persistent atrial fibrillation (H) 2015     Trochanteric bursitis     osteoarthritis     Venous insufficiency 2016            Family History   Problem Relation Age of Onset     Heart Disease Mother      No Known Problems Father      Chronic Obstructive Pulmonary Disease Sister      Kidney failure Brother      Liver Disease Brother      No Known Problems Son      No Known Problems Daughter       Social History     Socioeconomic History     Marital status:      Number of children: 2   Tobacco Use     Smoking status: Never     Smokeless tobacco: Never   Substance and Sexual Activity     Alcohol use: No     Drug use: Yes     Types: Nitrous oxide     Sexual activity: Not Currently     Partners: Male   Social History Narrative    She is .  She lives alone independently.  Her son Nigel is heavily involved in her care.  She has grandchildren and great-grandchildren.        REVIEW OF SYSTEM:  She does have failure to thrive and has had gradual decline over the past few days of dysphagia very little p.o. intake unable to take her medications as well as requiring oxygen with a negative chest x-ray as well as acute kidney injury.  No cough or cold symptoms.  No unusual aches or pains does have a history of lumbar radiculopathy.  She does appear to be very comfortable.    PHYSICAL EXAM:   Pleasant female.  Awake enough to have a conversation but really unable to carry on much of a conversation.  Her lungs are clear however decreased at the bases due to  very poor effort currently on oxygen to keep her saturations up.  Cardiovascular S1-S2 regular rate no lower extremity edema pacemaker.  Gastrointestinal nontender nondistended.  Musculoskeletal not moving but nontender to her periphery including her joints.    Current Outpatient Medications:      acetaminophen (TYLENOL) 325 MG tablet, Take 650 mg by mouth every 4 hours as needed for mild pain, Disp: , Rfl:      apixaban ANTICOAGULANT (ELIQUIS) 2.5 mg Tab tablet, [APIXABAN ANTICOAGULANT (ELIQUIS) 2.5 MG TAB TABLET] Take 1 tablet (2.5 mg total) by mouth 2 (two) times a day., Disp: 180 tablet, Rfl: 3     biotin 1000 MCG TABS tablet, Take 1,000 mcg by mouth daily, Disp: , Rfl:      Boswellia-Glucosamine-Vit D (OSTEO BI-FLEX ONE PER DAY PO), Give 1 tablet by mouth one time a day for GI upset, Disp: , Rfl:      cholecalciferol, vitamin D3, 1,000 unit tablet, Take 2,000 Units by mouth daily, Disp: , Rfl:      cyanocobalamin 1000 MCG tablet, [CYANOCOBALAMIN 1000 MCG TABLET] Take 1 tablet (1,000 mcg total) by mouth daily., Disp: 90 tablet, Rfl: 3     diclofenac (VOLTAREN) 1 % topical gel, Apply topically 3 times daily Left hip and lower back, Disp: , Rfl:      furosemide (LASIX) 20 MG tablet, One half tab daily (Patient taking differently: 10 mg One half tab daily), Disp: 45 tablet, Rfl: 3     gabapentin (NEURONTIN) 100 MG capsule, Take 1 capsule (100 mg) by mouth 3 times daily (Patient taking differently: Take 200 mg by mouth 4 times daily), Disp: 90 capsule, Rfl: 3     lidocaine (XYLOCAINE) 5 % external ointment, Apply topically as needed for moderate pain (4-6) Apply to Left lateral hip/thigh topically two times a day for Pain, Disp: , Rfl:      losartan (COZAAR) 25 MG tablet, [LOSARTAN (COZAAR) 25 MG TABLET] Take 1 tablet (25 mg total) by mouth daily., Disp: 90 tablet, Rfl: 3     metoprolol tartrate (LOPRESSOR) 25 MG tablet, [METOPROLOL TARTRATE (LOPRESSOR) 25 MG TABLET] Take 1 tablet (25 mg total) by mouth 2 (two)  times a day. (Patient taking differently: Take 25 mg by mouth daily), Disp: 180 tablet, Rfl: 3     nortriptyline (PAMELOR) 10 MG capsule, Take 1 capsule (10 mg) by mouth daily before breakfast, Disp: 31 capsule, Rfl: 3     polyethylene glycol (MIRALAX) 17 g packet, Take 17 g by mouth daily, Disp: , Rfl:      sennosides (SENOKOT) 8.6 MG tablet, Take 1 tablet by mouth 2 times daily as needed, Disp: , Rfl:        BP (!) 147/118   Pulse 54   Temp 97.9  F (36.6  C)   Resp 16   Wt 49.4 kg (108 lb 12.8 oz)   SpO2 (!) 85%   BMI 20.56 kg/m      LABS:   Last Comprehensive Metabolic Panel:  Lab Results   Component Value Date     (L) 03/16/2023    POTASSIUM 6.2 (HH) 03/16/2023    CHLORIDE 105 03/16/2023    CO2 14 (L) 03/16/2023    ANIONGAP 14 03/16/2023     (H) 03/16/2023    BUN 69.7 (H) 03/16/2023    CR 3.97 (H) 03/16/2023    GFRESTIMATED 10 (L) 03/16/2023    KONSTANTIN 10.3 (H) 03/16/2023       CBC RESULTS: Recent Labs   Lab Test 03/16/23  0627   WBC 12.0*   RBC 4.54   HGB 12.4   HCT 40.6   MCV 89   MCH 27.3   MCHC 30.5*   RDW 14.5              ASSESSMENT:    Encounter Diagnoses   Name Primary?     Adult failure to thrive Yes     Hypoxia      Essential hypertension      Dysphagia, unspecified type        PLAN:    She is failure to thrive and we will change the goals of care to hospice.  She is in acute kidney injury with elevated creatinine and potassium.  She is on oxygen for comfort.  I did hold all of her medications including her snacks and extra beverages they can certainly try but we want to prevent any aspiration.  Had a wonderful visit with her son Nigel today talking about her current findings as well as going on the hospice team so that order has been written.  Also wrote an order for have cardiology turn off her defibrillator.  In the meantime staff are doing a wonderful job of keeping her comfortable as well as checking on her frequently.    For documentation purposes total visit 45 minutes which  over 50% was only spent with the patient initially but also her son going over the findings as well as the goals of care the medications the dysphagia the drop in weight the overall generalized decline along with hypoxia.  He did seem comfortable with the information as well as the decision to put her on hospice.        Electronically signed by: Aaron Lopes NP          Sincerely,        Aaron Lopes NP

## 2023-03-16 NOTE — TELEPHONE ENCOUNTER
Plan for hospice enrollment with [facility]; however, there was going to be a delay until 3/20 to sign on. She has no meds for pain. I ordered hydromorphone and lorazepam (talked with Johnathon to ensure they had both). YOANA talked with [facility] hospice and they will now be out tomorrow morning to enroll.     Aye Ramírez MD

## 2023-03-16 NOTE — TELEPHONE ENCOUNTER
[facility] Geriatrics Triage Nurse Telephone Encounter    Provider: OZIEL Velazquez  Facility: [facility]  Facility Type:  LTC    Caller: Wellington   Call Back Number: [phone]    Allergies:    Allergies   Allergen Reactions     Atorvastatin Unknown     Bee Sting Kit [Bee Venom] Unknown     Codeine Unknown     Iodinated Contrast Media [Diagnostic X-Ray Materials] Angioedema     Other Allergy (See Comments) [External Allergen Needs Reconciliation - See Comment] Unknown     Contrast Media Ready-Box MISC, 04/28/2008.       Penicillins Unknown     Simvastatin Muscle Pain (Myalgia)        Reason for call: Pt has had a decline and had a new order hospice consult, hospice is not able to make it out until Monday and the family is requesting comfort medication.     Verbal Order/Direction given by Provider: Mrophine 20mg/ml Give 2.5mg SL q1h prn for pain/sob  Lorazepam 2mg/ml give 0.5mg SL q4h prn for agitation/anxiety.   Atropine 1% 2gtts sl q1h prn for increased secretions.   Please have the  contact hospice for a sooner admission \        Addendum: nursing staff received a call from Dr. Ramírez and the MD already gave orders. Disregard these orders.     Provider giving Order:  OZIEL Velazquez    Verbal Order given to: [place] Cadr RN

## 2023-03-16 NOTE — PROGRESS NOTES
[facility] GERIATRIC SERVICES    Facility:   [facility] () [[id number]]   Code Status: DNR/DNI      CHIEF COMPLAINT/REASON FOR VISIT:  Chief Complaint   Patient presents with     RECHECK       HISTORY:      HPI: Helen is a 89 year old female Who I was asked to visit with today secondary to periods of hypoxia who recently did begin oxygen therapy to keep her saturations above 90% plus now has been having some issues of taking in her medications as well as her nutrient supplements.  I did have a chance to meet with the patient she did open her eyes did have a nice smile on her face and unfortunately was really unable to answer too many questions for me but she tried to be somewhat engaging.  Always a very delightful lady to talk with.  At any rate I did have a chance to see her son today and we had a wonderful visit as well talking about the overall clinical findings as well as failure to thrive and the inability to take in foods or fluids and take her medications.  We did do a chest x-ray the other day and there was no infiltrate or effusion she did have a large hiatal hernia.  She did have a CBC and a BMP today and the creatinine is elevated the potassium was elevated but she also has not had any p.o. intake along with staff unsure that she is actually able taking in the appropriate amount of medications.  It does appear that she is in renal failure and after further conversation and discussion it was deemed that she would benefit from a change in the care model and going to hospice.  She does appear to be very comfortable at this time.  Her blood pressure on March 14 was 147/118 and there was no other blood pressures taken for comparison the last 1 prior to that was on March 7 at 108/58 her weight last on March 7 was 108 pounds back on February 7 she weighed 112 pounds.  She has been afebrile.  Currently on oxygen 3 L.  Her lungs are clear but decreased at the bases due to poor effort.  There is no edema.   She does not appear to be in any pain.    Past Medical History:   Diagnosis Date     Cellulitis of third toe of right foot      Dermatitis 2017     Diverticulosis 1998     Edema 2014     Esophageal reflux 2006     History of CVA (cerebrovascular accident) 3/22/2021     Hyperlipidemia 2007     Hypertension 2008     Osteoarthritis 2005     Paroxysmal atrial fibrillation (H) 2011     Persistent atrial fibrillation (H) 2015     Trochanteric bursitis     osteoarthritis     Venous insufficiency 2016            Family History   Problem Relation Age of Onset     Heart Disease Mother      No Known Problems Father      Chronic Obstructive Pulmonary Disease Sister      Kidney failure Brother      Liver Disease Brother      No Known Problems Son      No Known Problems Daughter       Social History     Socioeconomic History     Marital status:      Number of children: 2   Tobacco Use     Smoking status: Never     Smokeless tobacco: Never   Substance and Sexual Activity     Alcohol use: No     Drug use: Yes     Types: Nitrous oxide     Sexual activity: Not Currently     Partners: Male   Social History Narrative    She is .  She lives alone independently.  Her son Nigel is heavily involved in her care.  She has grandchildren and great-grandchildren.        REVIEW OF SYSTEM:  She does have failure to thrive and has had gradual decline over the past few days of dysphagia very little p.o. intake unable to take her medications as well as requiring oxygen with a negative chest x-ray as well as acute kidney injury.  No cough or cold symptoms.  No unusual aches or pains does have a history of lumbar radiculopathy.  She does appear to be very comfortable.    PHYSICAL EXAM:   Pleasant female.  Awake enough to have a conversation but really unable to carry on much of a conversation.  Her lungs are clear however decreased at the bases due to very poor effort currently on oxygen to keep her saturations up.  Cardiovascular S1-S2  regular rate no lower extremity edema pacemaker.  Gastrointestinal nontender nondistended.  Musculoskeletal not moving but nontender to her periphery including her joints.    Current Outpatient Medications:      acetaminophen (TYLENOL) 325 MG tablet, Take 650 mg by mouth every 4 hours as needed for mild pain, Disp: , Rfl:      apixaban ANTICOAGULANT (ELIQUIS) 2.5 mg Tab tablet, [APIXABAN ANTICOAGULANT (ELIQUIS) 2.5 MG TAB TABLET] Take 1 tablet (2.5 mg total) by mouth 2 (two) times a day., Disp: 180 tablet, Rfl: 3     biotin 1000 MCG TABS tablet, Take 1,000 mcg by mouth daily, Disp: , Rfl:      Boswellia-Glucosamine-Vit D (OSTEO BI-FLEX ONE PER DAY PO), Give 1 tablet by mouth one time a day for GI upset, Disp: , Rfl:      cholecalciferol, vitamin D3, 1,000 unit tablet, Take 2,000 Units by mouth daily, Disp: , Rfl:      cyanocobalamin 1000 MCG tablet, [CYANOCOBALAMIN 1000 MCG TABLET] Take 1 tablet (1,000 mcg total) by mouth daily., Disp: 90 tablet, Rfl: 3     diclofenac (VOLTAREN) 1 % topical gel, Apply topically 3 times daily Left hip and lower back, Disp: , Rfl:      furosemide (LASIX) 20 MG tablet, One half tab daily (Patient taking differently: 10 mg One half tab daily), Disp: 45 tablet, Rfl: 3     gabapentin (NEURONTIN) 100 MG capsule, Take 1 capsule (100 mg) by mouth 3 times daily (Patient taking differently: Take 200 mg by mouth 4 times daily), Disp: 90 capsule, Rfl: 3     lidocaine (XYLOCAINE) 5 % external ointment, Apply topically as needed for moderate pain (4-6) Apply to Left lateral hip/thigh topically two times a day for Pain, Disp: , Rfl:      losartan (COZAAR) 25 MG tablet, [LOSARTAN (COZAAR) 25 MG TABLET] Take 1 tablet (25 mg total) by mouth daily., Disp: 90 tablet, Rfl: 3     metoprolol tartrate (LOPRESSOR) 25 MG tablet, [METOPROLOL TARTRATE (LOPRESSOR) 25 MG TABLET] Take 1 tablet (25 mg total) by mouth 2 (two) times a day. (Patient taking differently: Take 25 mg by mouth daily), Disp: 180 tablet,  Rfl: 3     nortriptyline (PAMELOR) 10 MG capsule, Take 1 capsule (10 mg) by mouth daily before breakfast, Disp: 31 capsule, Rfl: 3     polyethylene glycol (MIRALAX) 17 g packet, Take 17 g by mouth daily, Disp: , Rfl:      sennosides (SENOKOT) 8.6 MG tablet, Take 1 tablet by mouth 2 times daily as needed, Disp: , Rfl:        BP (!) 147/118   Pulse 54   Temp 97.9  F (36.6  C)   Resp 16   Wt 49.4 kg (108 lb 12.8 oz)   SpO2 (!) 85%   BMI 20.56 kg/m      LABS:   Last Comprehensive Metabolic Panel:  Lab Results   Component Value Date     (L) 03/16/2023    POTASSIUM 6.2 (HH) 03/16/2023    CHLORIDE 105 03/16/2023    CO2 14 (L) 03/16/2023    ANIONGAP 14 03/16/2023     (H) 03/16/2023    BUN 69.7 (H) 03/16/2023    CR 3.97 (H) 03/16/2023    GFRESTIMATED 10 (L) 03/16/2023    KONSTANTIN 10.3 (H) 03/16/2023       CBC RESULTS: Recent Labs   Lab Test 03/16/23  0627   WBC 12.0*   RBC 4.54   HGB 12.4   HCT 40.6   MCV 89   MCH 27.3   MCHC 30.5*   RDW 14.5              ASSESSMENT:    Encounter Diagnoses   Name Primary?     Adult failure to thrive Yes     Hypoxia      Essential hypertension      Dysphagia, unspecified type        PLAN:    She is failure to thrive and we will change the goals of care to hospice.  She is in acute kidney injury with elevated creatinine and potassium.  She is on oxygen for comfort.  I did hold all of her medications including her snacks and extra beverages they can certainly try but we want to prevent any aspiration.  Had a wonderful visit with her son Nigel today talking about her current findings as well as going on the hospice team so that order has been written.  Also wrote an order for have cardiology turn off her defibrillator.  In the meantime staff are doing a wonderful job of keeping her comfortable as well as checking on her frequently.    For documentation purposes total visit 45 minutes which over 50% was only spent with the patient initially but also her son going over the  findings as well as the goals of care the medications the dysphagia the drop in weight the overall generalized decline along with hypoxia.  He did seem comfortable with the information as well as the decision to put her on hospice.        Electronically signed by: Aaron Lopes NP

## 2024-02-21 NOTE — PATIENT INSTRUCTIONS - HE
1. Continue present eliquis and other medications.     2. Follow up as previously arranged with Dr. Delaney   20-Feb-2024 22:50